# Patient Record
Sex: MALE | Race: WHITE | NOT HISPANIC OR LATINO | Employment: OTHER | URBAN - METROPOLITAN AREA
[De-identification: names, ages, dates, MRNs, and addresses within clinical notes are randomized per-mention and may not be internally consistent; named-entity substitution may affect disease eponyms.]

---

## 2023-05-12 ENCOUNTER — TELEPHONE (OUTPATIENT)
Dept: INTERNAL MEDICINE | Facility: CLINIC | Age: 73
End: 2023-05-12

## 2023-05-12 NOTE — TELEPHONE ENCOUNTER
Caller: Roverto Christensen     Relationship: [employer]     Best call back number: [phone]     What was the call regarding: PATIENT'S SISTER-IN-LAW AND BROTHER-IN-LAW, SELINA TURCIOS AND RADHIKA TURCIOS, ARE PATIENTS OF DR. PEREZ AND RECOMMEND HER CARE.      PATIENT IS REQUESTING TO BE TAKEN ON AS A NEW PATIENT OF DR. PEREZ.     Do you require a callback: YES, PLEASE CALL PATIENT'S WIFE, ROVERTO, TO LET PATIENT KNOW WETHER DR. PEREZ DECIDES TO TAKE HIM OR NOT

## 2023-08-02 ENCOUNTER — EXTERNAL PBMM DATA (OUTPATIENT)
Dept: PHARMACY | Facility: OTHER | Age: 73
End: 2023-08-02

## 2023-08-31 ENCOUNTER — OFFICE VISIT (OUTPATIENT)
Dept: INTERNAL MEDICINE | Facility: CLINIC | Age: 73
End: 2023-08-31
Payer: MEDICARE

## 2023-08-31 VITALS
DIASTOLIC BLOOD PRESSURE: 73 MMHG | HEART RATE: 111 BPM | BODY MASS INDEX: 44.11 KG/M2 | SYSTOLIC BLOOD PRESSURE: 146 MMHG | HEIGHT: 69 IN | WEIGHT: 297.8 LBS | OXYGEN SATURATION: 96 %

## 2023-08-31 DIAGNOSIS — E78.5 HYPERLIPIDEMIA, UNSPECIFIED HYPERLIPIDEMIA TYPE: ICD-10-CM

## 2023-08-31 DIAGNOSIS — Z79.899 HIGH RISK MEDICATION USE: ICD-10-CM

## 2023-08-31 DIAGNOSIS — I10 HYPERTENSION, UNSPECIFIED TYPE: ICD-10-CM

## 2023-08-31 DIAGNOSIS — E11.65 TYPE 2 DIABETES MELLITUS WITH HYPERGLYCEMIA, WITH LONG-TERM CURRENT USE OF INSULIN: Primary | ICD-10-CM

## 2023-08-31 DIAGNOSIS — Z79.4 TYPE 2 DIABETES MELLITUS WITH HYPERGLYCEMIA, WITH LONG-TERM CURRENT USE OF INSULIN: Primary | ICD-10-CM

## 2023-08-31 RX ORDER — GABAPENTIN 300 MG/1
CAPSULE ORAL
COMMUNITY
End: 2023-08-31

## 2023-08-31 RX ORDER — INSULIN HUMAN 100 [IU]/ML
INJECTION, SUSPENSION SUBCUTANEOUS
COMMUNITY
End: 2023-08-31 | Stop reason: SDUPTHER

## 2023-08-31 RX ORDER — LISINOPRIL AND HYDROCHLOROTHIAZIDE 25; 20 MG/1; MG/1
1 TABLET ORAL DAILY
Qty: 90 TABLET | Refills: 2 | Status: SHIPPED | OUTPATIENT
Start: 2023-08-31

## 2023-08-31 RX ORDER — CIPROFLOXACIN HYDROCHLORIDE 3.5 MG/ML
SOLUTION/ DROPS TOPICAL
COMMUNITY
End: 2023-08-31

## 2023-08-31 RX ORDER — DIPHENHYDRAMINE HYDROCHLORIDE 25 MG/1
CAPSULE, LIQUID FILLED ORAL
Qty: 1 EACH | Refills: 0 | Status: SHIPPED | OUTPATIENT
Start: 2023-08-31

## 2023-08-31 RX ORDER — PIOGLITAZONEHYDROCHLORIDE 30 MG/1
TABLET ORAL
COMMUNITY
End: 2023-08-31 | Stop reason: SDUPTHER

## 2023-08-31 RX ORDER — INSULIN HUMAN 100 [IU]/ML
85 INJECTION, SUSPENSION SUBCUTANEOUS
Qty: 155 ML | Refills: 0 | Status: SHIPPED | OUTPATIENT
Start: 2023-08-31

## 2023-08-31 RX ORDER — ALBUTEROL SULFATE 90 UG/1
AEROSOL, METERED RESPIRATORY (INHALATION)
COMMUNITY
End: 2023-08-31 | Stop reason: SDUPTHER

## 2023-08-31 RX ORDER — GLUCOSAMINE HCL/CHONDROITIN SU 500-400 MG
CAPSULE ORAL
Qty: 100 EACH | Refills: 2 | Status: SHIPPED | OUTPATIENT
Start: 2023-08-31

## 2023-08-31 RX ORDER — INSULIN HUMAN 100 [IU]/ML
10 INJECTION, SOLUTION PARENTERAL
Qty: 9 ML | Refills: 2 | Status: SHIPPED | OUTPATIENT
Start: 2023-08-31

## 2023-08-31 RX ORDER — LISINOPRIL AND HYDROCHLOROTHIAZIDE 25; 20 MG/1; MG/1
TABLET ORAL
COMMUNITY
End: 2023-08-31 | Stop reason: SDUPTHER

## 2023-08-31 RX ORDER — LANCETS
EACH MISCELLANEOUS
Qty: 100 EACH | Refills: 3 | Status: SHIPPED | OUTPATIENT
Start: 2023-08-31

## 2023-08-31 RX ORDER — IBUPROFEN 600 MG/1
TABLET ORAL
COMMUNITY

## 2023-08-31 RX ORDER — ALBUTEROL SULFATE 90 UG/1
1 AEROSOL, METERED RESPIRATORY (INHALATION) EVERY 4 HOURS PRN
Qty: 6.7 G | Refills: 3 | Status: SHIPPED | OUTPATIENT
Start: 2023-08-31

## 2023-08-31 RX ORDER — INSULIN HUMAN 100 [IU]/ML
INJECTION, SOLUTION PARENTERAL
COMMUNITY
End: 2023-08-31 | Stop reason: SDUPTHER

## 2023-08-31 RX ORDER — PIOGLITAZONEHYDROCHLORIDE 30 MG/1
30 TABLET ORAL DAILY
Qty: 90 TABLET | Refills: 2 | Status: SHIPPED | OUTPATIENT
Start: 2023-08-31

## 2023-08-31 RX ORDER — FLUCONAZOLE 200 MG/1
1 TABLET ORAL DAILY
COMMUNITY
End: 2023-08-31

## 2023-08-31 NOTE — PROGRESS NOTES
"  Ha Bear M.D.  Internal Medicine  [facility]  [address], [address]  [place] [address]  [phone]      Chief Complaint  Establish Care and Med Refill    SUBJECTIVE    History of Present Illness    Brian Christensen is a 73 y.o. male who presents to the office today as a new patient to establish care.     Here with Wife and daughter    Diabetes-on metformin, Actos, Humalog. Last A1c was 7.2% per patient. Takes sliding scale insulin as well and then rechecks 1 hour later.     Hypertension on lisinopril-hydrochlorothiazide    Bad knee-does not walk  far    Uses albuterol. PFTs \"many years ago\" was fine.     He thinks he is up to date on Shingrix.     Review of Systems    No Known Allergies     Outpatient Medications Marked as Taking for the 8/31/23 encounter (Office Visit) with Ha Bear MD   Medication Sig Dispense Refill    albuterol sulfate  (90 Base) MCG/ACT inhaler Inhale 1 puff Every 4 (Four) Hours As Needed for Wheezing. 6.7 g 3    HumuLIN N 100 UNIT/ML injection Inject 85 Units under the skin into the appropriate area as directed 2 (Two) Times a Day Before Meals. 155 mL 0    HumuLIN R 100 UNIT/ML injection Inject 10 Units under the skin into the appropriate area as directed 3 (Three) Times a Day Before Meals. 9 mL 2    ibuprofen (ADVIL,MOTRIN) 600 MG tablet       Insulin Syringes, Disposable, U-100 0.5 ML misc Use needle 5 times daily for injection into the skin of the abdomen or upper outside of the thighs.   Do not reuse needles. 100 each 2    lisinopril-hydrochlorothiazide (PRINZIDE,ZESTORETIC) 20-25 MG per tablet Take 1 tablet by mouth Daily. 90 tablet 2    metFORMIN (GLUCOPHAGE) 1000 MG tablet Take 1 tablet by mouth 2 (Two) Times a Day With Meals. 180 tablet 2    pioglitazone (ACTOS) 30 MG tablet Take 1 tablet by mouth Daily. 90 tablet 2    [DISCONTINUED] albuterol sulfate  (90 Base) MCG/ACT inhaler       [DISCONTINUED] HumuLIN N 100 UNIT/ML injection       " "[DISCONTINUED] HumuLIN R 100 UNIT/ML injection       [DISCONTINUED] lisinopril-hydrochlorothiazide (PRINZIDE,ZESTORETIC) 20-25 MG per tablet       [DISCONTINUED] metFORMIN (GLUCOPHAGE) 1000 MG tablet       [DISCONTINUED] pioglitazone (ACTOS) 30 MG tablet           History reviewed. No pertinent past medical history.History reviewed. No pertinent surgical history.  Family History   Problem Relation Age of Onset    Prostate cancer Father     reports that he quit smoking about 40 years ago. His smoking use included cigarettes. He has a 20.00 pack-year smoking history. He has never used smokeless tobacco. He reports that he does not currently use alcohol. He reports that he does not use drugs.    OBJECTIVE    Vital Signs:   /73   Pulse 111   Ht 174 cm (68.5\")   Wt 135 kg (297 lb 12.8 oz)   SpO2 96%   BMI 44.62 kg/m²     Physical Exam  Constitutional:       Appearance: Normal appearance.      Comments: Uses walker   Cardiovascular:      Rate and Rhythm: Normal rate and regular rhythm.      Heart sounds: Normal heart sounds. No murmur heard.  Pulmonary:      Effort: Pulmonary effort is normal.      Breath sounds: Normal breath sounds.   Abdominal:      General: Abdomen is flat. There is no distension.      Palpations: Abdomen is soft.      Tenderness: There is no abdominal tenderness.   Skin:     General: Skin is warm and dry.   Neurological:      Mental Status: He is alert.   Psychiatric:         Mood and Affect: Mood normal.         Behavior: Behavior normal.         Thought Content: Thought content normal.          The following data was reviewed by: Ha Bear MD on 08/31/2023:  Common labs          8/31/2023    15:09   Common Labs   Glucose 432    BUN 29    Creatinine 1.21    Sodium 130    Potassium 5.6    Chloride 92    Calcium 10.9    Total Protein 8.1    Albumin 4.7    Total Bilirubin 0.7    Alkaline Phosphatase 71    AST (SGOT) 19    ALT (SGPT) 27    WBC 9.34    Hemoglobin 14.9    Hematocrit 45.1  "   Platelets 393    Total Cholesterol 241    Triglycerides 348    HDL Cholesterol 34    LDL Cholesterol  143    Hemoglobin A1C 11.10      Data reviewed : No data to review          The 10-year ASCVD risk score (Patrizia HUANG, et al., 2019) is: 57.2%    Values used to calculate the score:      Age: 73 years      Sex: Male      Is Non- : No      Diabetic: Yes      Tobacco smoker: No      Systolic Blood Pressure: 146 mmHg      Is BP treated: Yes      HDL Cholesterol: 34 mg/dL      Total Cholesterol: 241 mg/dL      ASSESSMENT & PLAN     Diagnoses and all orders for this visit:    1. Type 2 diabetes mellitus with hyperglycemia, with long-term current use of insulin (Primary)  -     HumuLIN N 100 UNIT/ML injection; Inject 85 Units under the skin into the appropriate area as directed 2 (Two) Times a Day Before Meals.  Dispense: 155 mL; Refill: 0  -     HumuLIN R 100 UNIT/ML injection; Inject 10 Units under the skin into the appropriate area as directed 3 (Three) Times a Day Before Meals.  Dispense: 9 mL; Refill: 2  -     Glucose Blood (Blood Glucose Test) strip; Use with blood glucose meter to check blood sugar 5 times per day.  Dispense: 100 each; Refill: 2  -     Lancets Thin misc; Use with lancet device to check blood glucose 5 times per day via finger stick.  Dispense: 100 each; Refill: 3  -     Blood Glucose Monitoring Suppl (Blood Glucose Monitor System) w/Device kit; Use to check finger stick blood sugar _ times per day.  Dispense: 1 each; Refill: 0  -     metFORMIN (GLUCOPHAGE) 1000 MG tablet; Take 1 tablet by mouth 2 (Two) Times a Day With Meals.  Dispense: 180 tablet; Refill: 2  -     pioglitazone (ACTOS) 30 MG tablet; Take 1 tablet by mouth Daily.  Dispense: 90 tablet; Refill: 2  -     Ambulatory Referral to Endocrinology  -     Lipid Panel  -     Hemoglobin A1c  -     Insulin Syringes, Disposable, U-100 0.5 ML misc; Use needle 5 times daily for injection into the skin of the abdomen or upper  outside of the thighs.   Do not reuse needles.  Dispense: 100 each; Refill: 2    2. Hypertension, unspecified type  -     lisinopril-hydrochlorothiazide (PRINZIDE,ZESTORETIC) 20-25 MG per tablet; Take 1 tablet by mouth Daily.  Dispense: 90 tablet; Refill: 2  -     Comprehensive Metabolic Panel    3. Hyperlipidemia, unspecified hyperlipidemia type    4. High risk medication use  -     CBC & Differential    Other orders  -     Discontinue: Insulin Syringes, Disposable, U-100 0.5 ML misc; Use needle 5 times daily for injection into the skin of the abdomen or upper outside of the thighs.   Do not reuse needles.  Dispense: 100 each; Refill: 2  -     albuterol sulfate  (90 Base) MCG/ACT inhaler; Inhale 1 puff Every 4 (Four) Hours As Needed for Wheezing.  Dispense: 6.7 g; Refill: 3  -     Pneumococcal Conjugate Vaccine 20-Valent (PCV20)        Declines AAA. Screen. States this would be an acceptable way to die for him.      A1c 11.1% and BG critically high at 432. He thinks this was a fasting blood glucose. May have eaten chicken and potatoes for dinner the night previous.     He is on a significant amount of long acting insulin but does not seem to be taking Recommend 10 U Humilin R TID prior to meals and then sliding scale after. Discussed to bring BG log to next appointment so we can titrate further. Referring to endocrinology.    BP above goal. Will continue lisinopril-HCTZ for now. Patient to bring log to next appointment. Checking CMP today.     Discuss statin next appointment        Health Maintenance Due   Topic Date Due    URINE MICROALBUMIN  Never done    BMI FOLLOWUP  Never done    COLORECTAL CANCER SCREENING  Never done    COVID-19 Vaccine (1) Never done    TDAP/TD VACCINES (1 - Tdap) Never done    ZOSTER VACCINE (1 of 2) Never done    HEPATITIS C SCREENING  Never done    ANNUAL WELLNESS VISIT  Never done    DIABETIC FOOT EXAM  Never done    DIABETIC EYE EXAM  Never done    AAA SCREEN (ONE-TIME)  Never  done        Follow Up  Return in about 3 months (around 11/30/2023).    Patient/family had no further questions at this time and verbalized understanding of the plan discussed today.

## 2023-09-01 ENCOUNTER — TELEPHONE (OUTPATIENT)
Dept: INTERNAL MEDICINE | Facility: CLINIC | Age: 73
End: 2023-09-01
Payer: MEDICARE

## 2023-09-01 PROBLEM — E78.5 HYPERLIPIDEMIA: Status: ACTIVE | Noted: 2023-09-01

## 2023-09-01 PROBLEM — E11.65 TYPE 2 DIABETES MELLITUS WITH HYPERGLYCEMIA, WITH LONG-TERM CURRENT USE OF INSULIN: Status: ACTIVE | Noted: 2023-09-01

## 2023-09-01 PROBLEM — I10 HYPERTENSION: Status: ACTIVE | Noted: 2023-09-01

## 2023-09-01 PROBLEM — Z79.4 TYPE 2 DIABETES MELLITUS WITH HYPERGLYCEMIA, WITH LONG-TERM CURRENT USE OF INSULIN: Status: ACTIVE | Noted: 2023-09-01

## 2023-09-01 LAB
ALBUMIN SERPL-MCNC: 4.7 G/DL (ref 3.5–5.2)
ALBUMIN/GLOB SERPL: 1.4 G/DL
ALP SERPL-CCNC: 71 U/L (ref 39–117)
ALT SERPL-CCNC: 27 U/L (ref 1–41)
AST SERPL-CCNC: 19 U/L (ref 1–40)
BASOPHILS # BLD AUTO: 0.08 10*3/MM3 (ref 0–0.2)
BASOPHILS NFR BLD AUTO: 0.9 % (ref 0–1.5)
BILIRUB SERPL-MCNC: 0.7 MG/DL (ref 0–1.2)
BUN SERPL-MCNC: 29 MG/DL (ref 8–23)
BUN/CREAT SERPL: 24 (ref 7–25)
CALCIUM SERPL-MCNC: 10.9 MG/DL (ref 8.6–10.5)
CHLORIDE SERPL-SCNC: 92 MMOL/L (ref 98–107)
CHOLEST SERPL-MCNC: 241 MG/DL (ref 0–200)
CO2 SERPL-SCNC: 24 MMOL/L (ref 22–29)
CREAT SERPL-MCNC: 1.21 MG/DL (ref 0.76–1.27)
EGFRCR SERPLBLD CKD-EPI 2021: 63.6 ML/MIN/1.73
EOSINOPHIL # BLD AUTO: 0.24 10*3/MM3 (ref 0–0.4)
EOSINOPHIL NFR BLD AUTO: 2.6 % (ref 0.3–6.2)
ERYTHROCYTE [DISTWIDTH] IN BLOOD BY AUTOMATED COUNT: 13.4 % (ref 12.3–15.4)
GLOBULIN SER CALC-MCNC: 3.4 GM/DL
GLUCOSE SERPL-MCNC: 432 MG/DL (ref 65–99)
HBA1C MFR BLD: 11.1 % (ref 4.8–5.6)
HCT VFR BLD AUTO: 45.1 % (ref 37.5–51)
HDLC SERPL-MCNC: 34 MG/DL (ref 40–60)
HGB BLD-MCNC: 14.9 G/DL (ref 13–17.7)
IMM GRANULOCYTES # BLD AUTO: 0.05 10*3/MM3 (ref 0–0.05)
IMM GRANULOCYTES NFR BLD AUTO: 0.5 % (ref 0–0.5)
LDLC SERPL CALC-MCNC: 143 MG/DL (ref 0–100)
LYMPHOCYTES # BLD AUTO: 2.52 10*3/MM3 (ref 0.7–3.1)
LYMPHOCYTES NFR BLD AUTO: 27 % (ref 19.6–45.3)
MCH RBC QN AUTO: 29.4 PG (ref 26.6–33)
MCHC RBC AUTO-ENTMCNC: 33 G/DL (ref 31.5–35.7)
MCV RBC AUTO: 89 FL (ref 79–97)
MONOCYTES # BLD AUTO: 0.68 10*3/MM3 (ref 0.1–0.9)
MONOCYTES NFR BLD AUTO: 7.3 % (ref 5–12)
NEUTROPHILS # BLD AUTO: 5.77 10*3/MM3 (ref 1.7–7)
NEUTROPHILS NFR BLD AUTO: 61.7 % (ref 42.7–76)
NRBC BLD AUTO-RTO: 0 /100 WBC (ref 0–0.2)
PLATELET # BLD AUTO: 393 10*3/MM3 (ref 140–450)
POTASSIUM SERPL-SCNC: 5.6 MMOL/L (ref 3.5–5.2)
PROT SERPL-MCNC: 8.1 G/DL (ref 6–8.5)
RBC # BLD AUTO: 5.07 10*6/MM3 (ref 4.14–5.8)
SODIUM SERPL-SCNC: 130 MMOL/L (ref 136–145)
TRIGL SERPL-MCNC: 348 MG/DL (ref 0–150)
VLDLC SERPL CALC-MCNC: 64 MG/DL (ref 5–40)
WBC # BLD AUTO: 9.34 10*3/MM3 (ref 3.4–10.8)

## 2023-09-01 NOTE — TELEPHONE ENCOUNTER
I called Brian Christensen at [phone] at 13:10 EDT     Discussed lab results with patient and his wife.  Discussed that his glucose is critically high.  His sodium is on the low side and his potassium is elevated as well.  Suspect this is related to his hyperglycemia.  Discussed that his A1c is above goal at 11.1%.  Apparently he was fasting yesterday for his labs.  He states he had chicken and potatoes for dinner the night previous.  He is on a substantial amount of basal insulin.  I suspect he does need more mealtime insulin as he has not been consistent in taking this.  Discussed to take 10 units Humulin R prior to meals and his addition to his sliding scale.  Cholesterol is quite elevated as well.  We will discuss a statin at his next appointment.

## 2023-10-24 RX ORDER — IBUPROFEN 600 MG/1
TABLET ORAL
Qty: 90 TABLET | Refills: 0 | Status: SHIPPED | OUTPATIENT
Start: 2023-10-24

## 2023-10-24 RX ORDER — GEMFIBROZIL 600 MG/1
600 TABLET, FILM COATED ORAL 2 TIMES DAILY
Qty: 60 TABLET | Refills: 0 | Status: SHIPPED | OUTPATIENT
Start: 2023-10-24

## 2023-11-30 DIAGNOSIS — E11.65 TYPE 2 DIABETES MELLITUS WITH HYPERGLYCEMIA, WITH LONG-TERM CURRENT USE OF INSULIN: ICD-10-CM

## 2023-11-30 DIAGNOSIS — Z79.4 TYPE 2 DIABETES MELLITUS WITH HYPERGLYCEMIA, WITH LONG-TERM CURRENT USE OF INSULIN: ICD-10-CM

## 2023-11-30 RX ORDER — INSULIN HUMAN 100 [IU]/ML
10 INJECTION, SOLUTION PARENTERAL
Qty: 10 ML | Refills: 2 | Status: SHIPPED | OUTPATIENT
Start: 2023-11-30

## 2023-12-01 ENCOUNTER — OFFICE VISIT (OUTPATIENT)
Dept: INTERNAL MEDICINE | Facility: CLINIC | Age: 73
End: 2023-12-01
Payer: MEDICARE

## 2023-12-01 ENCOUNTER — APPOINTMENT (OUTPATIENT)
Dept: GENERAL RADIOLOGY | Facility: HOSPITAL | Age: 73
End: 2023-12-01
Payer: MEDICARE

## 2023-12-01 ENCOUNTER — HOSPITAL ENCOUNTER (OUTPATIENT)
Facility: HOSPITAL | Age: 73
Setting detail: OBSERVATION
Discharge: HOME | End: 2023-12-03
Attending: EMERGENCY MEDICINE | Admitting: INTERNAL MEDICINE
Payer: MEDICARE

## 2023-12-01 ENCOUNTER — TELEPHONE (OUTPATIENT)
Dept: INTERNAL MEDICINE | Facility: CLINIC | Age: 73
End: 2023-12-01

## 2023-12-01 VITALS
WEIGHT: 297 LBS | DIASTOLIC BLOOD PRESSURE: 56 MMHG | HEART RATE: 113 BPM | HEIGHT: 69 IN | BODY MASS INDEX: 43.99 KG/M2 | SYSTOLIC BLOOD PRESSURE: 74 MMHG | OXYGEN SATURATION: 95 %

## 2023-12-01 DIAGNOSIS — Z79.4 TYPE 2 DIABETES MELLITUS WITH HYPERGLYCEMIA, WITH LONG-TERM CURRENT USE OF INSULIN: ICD-10-CM

## 2023-12-01 DIAGNOSIS — E78.5 HYPERLIPIDEMIA, UNSPECIFIED HYPERLIPIDEMIA TYPE: ICD-10-CM

## 2023-12-01 DIAGNOSIS — I95.9 HYPOTENSION, UNSPECIFIED HYPOTENSION TYPE: Primary | ICD-10-CM

## 2023-12-01 DIAGNOSIS — I10 HYPERTENSION, UNSPECIFIED TYPE: ICD-10-CM

## 2023-12-01 DIAGNOSIS — N17.9 AKI (ACUTE KIDNEY INJURY): ICD-10-CM

## 2023-12-01 DIAGNOSIS — R73.9 HYPERGLYCEMIA: ICD-10-CM

## 2023-12-01 DIAGNOSIS — E11.65 TYPE 2 DIABETES MELLITUS WITH HYPERGLYCEMIA, WITH LONG-TERM CURRENT USE OF INSULIN: ICD-10-CM

## 2023-12-01 PROBLEM — R79.89 ELEVATED TROPONIN: Status: ACTIVE | Noted: 2023-12-01

## 2023-12-01 PROBLEM — E87.5 HYPERKALEMIA: Status: ACTIVE | Noted: 2023-12-01

## 2023-12-01 LAB
ALBUMIN SERPL-MCNC: 4.6 G/DL (ref 3.5–5.2)
ALBUMIN/GLOB SERPL: 1.4 G/DL
ALP SERPL-CCNC: 65 U/L (ref 39–117)
ALT SERPL W P-5'-P-CCNC: 31 U/L (ref 1–41)
ANION GAP SERPL CALCULATED.3IONS-SCNC: 14 MMOL/L (ref 5–15)
ANION GAP SERPL CALCULATED.3IONS-SCNC: 17.2 MMOL/L (ref 5–15)
AST SERPL-CCNC: 19 U/L (ref 1–40)
BACTERIA UR QL AUTO: ABNORMAL /HPF
BASOPHILS # BLD AUTO: 0.05 10*3/MM3 (ref 0–0.2)
BASOPHILS NFR BLD AUTO: 0.7 % (ref 0–1.5)
BILIRUB SERPL-MCNC: 0.5 MG/DL (ref 0–1.2)
BILIRUB UR QL STRIP: NEGATIVE
BUN SERPL-MCNC: 37 MG/DL (ref 8–23)
BUN SERPL-MCNC: 38 MG/DL (ref 8–23)
BUN/CREAT SERPL: 22.5 (ref 7–25)
BUN/CREAT SERPL: 26.8 (ref 7–25)
CALCIUM SPEC-SCNC: 11 MG/DL (ref 8.6–10.5)
CALCIUM SPEC-SCNC: 9.9 MG/DL (ref 8.6–10.5)
CHLORIDE SERPL-SCNC: 92 MMOL/L (ref 98–107)
CHLORIDE SERPL-SCNC: 96 MMOL/L (ref 98–107)
CLARITY UR: ABNORMAL
CO2 SERPL-SCNC: 19.8 MMOL/L (ref 22–29)
CO2 SERPL-SCNC: 24 MMOL/L (ref 22–29)
COLOR UR: YELLOW
CORTIS SERPL-MCNC: 23.2 MCG/DL
CREAT SERPL-MCNC: 1.38 MG/DL (ref 0.76–1.27)
CREAT SERPL-MCNC: 1.69 MG/DL (ref 0.76–1.27)
D-LACTATE SERPL-SCNC: 1.9 MMOL/L (ref 0.5–2)
D-LACTATE SERPL-SCNC: 3.1 MMOL/L (ref 0.5–2)
DEPRECATED RDW RBC AUTO: 43.2 FL (ref 37–54)
EGFRCR SERPLBLD CKD-EPI 2021: 42.6 ML/MIN/1.73
EGFRCR SERPLBLD CKD-EPI 2021: 54.3 ML/MIN/1.73
EOSINOPHIL # BLD AUTO: 0.08 10*3/MM3 (ref 0–0.4)
EOSINOPHIL NFR BLD AUTO: 1.1 % (ref 0.3–6.2)
ERYTHROCYTE [DISTWIDTH] IN BLOOD BY AUTOMATED COUNT: 13.2 % (ref 12.3–15.4)
GEN 5 2HR TROPONIN T REFLEX: 47 NG/L
GLOBULIN UR ELPH-MCNC: 3.2 GM/DL
GLUCOSE BLDC GLUCOMTR-MCNC: 369 MG/DL (ref 70–130)
GLUCOSE BLDC GLUCOMTR-MCNC: 376 MG/DL (ref 70–130)
GLUCOSE BLDC GLUCOMTR-MCNC: 481 MG/DL (ref 70–130)
GLUCOSE SERPL-MCNC: 374 MG/DL (ref 65–99)
GLUCOSE SERPL-MCNC: 452 MG/DL (ref 65–99)
GLUCOSE UR STRIP-MCNC: ABNORMAL MG/DL
HCT VFR BLD AUTO: 46 % (ref 37.5–51)
HGB BLD-MCNC: 14.5 G/DL (ref 13–17.7)
HGB UR QL STRIP.AUTO: NEGATIVE
HYALINE CASTS UR QL AUTO: ABNORMAL /LPF
IMM GRANULOCYTES # BLD AUTO: 0.02 10*3/MM3 (ref 0–0.05)
IMM GRANULOCYTES NFR BLD AUTO: 0.3 % (ref 0–0.5)
KETONES UR QL STRIP: NEGATIVE
LEUKOCYTE ESTERASE UR QL STRIP.AUTO: ABNORMAL
LYMPHOCYTES # BLD AUTO: 1.8 10*3/MM3 (ref 0.7–3.1)
LYMPHOCYTES NFR BLD AUTO: 23.7 % (ref 19.6–45.3)
MCH RBC QN AUTO: 28 PG (ref 26.6–33)
MCHC RBC AUTO-ENTMCNC: 31.5 G/DL (ref 31.5–35.7)
MCV RBC AUTO: 89 FL (ref 79–97)
MONOCYTES # BLD AUTO: 0.72 10*3/MM3 (ref 0.1–0.9)
MONOCYTES NFR BLD AUTO: 9.5 % (ref 5–12)
NEUTROPHILS NFR BLD AUTO: 4.92 10*3/MM3 (ref 1.7–7)
NEUTROPHILS NFR BLD AUTO: 64.7 % (ref 42.7–76)
NITRITE UR QL STRIP: NEGATIVE
NRBC BLD AUTO-RTO: 0 /100 WBC (ref 0–0.2)
PH UR STRIP.AUTO: <=5 [PH] (ref 5–8)
PLATELET # BLD AUTO: 389 10*3/MM3 (ref 140–450)
PMV BLD AUTO: 10.8 FL (ref 6–12)
POTASSIUM SERPL-SCNC: 4.9 MMOL/L (ref 3.5–5.2)
POTASSIUM SERPL-SCNC: 5.5 MMOL/L (ref 3.5–5.2)
PROT SERPL-MCNC: 7.8 G/DL (ref 6–8.5)
PROT UR QL STRIP: ABNORMAL
QT INTERVAL: 340 MS
QTC INTERVAL: 432 MS
RBC # BLD AUTO: 5.17 10*6/MM3 (ref 4.14–5.8)
RBC # UR STRIP: ABNORMAL /HPF
REF LAB TEST METHOD: ABNORMAL
SODIUM SERPL-SCNC: 130 MMOL/L (ref 136–145)
SODIUM SERPL-SCNC: 133 MMOL/L (ref 136–145)
SP GR UR STRIP: 1.02 (ref 1–1.03)
SQUAMOUS #/AREA URNS HPF: ABNORMAL /HPF
TROPONIN T DELTA: -9 NG/L
TROPONIN T SERPL HS-MCNC: 56 NG/L
UROBILINOGEN UR QL STRIP: ABNORMAL
WBC # UR STRIP: ABNORMAL /HPF
WBC NRBC COR # BLD AUTO: 7.59 10*3/MM3 (ref 3.4–10.8)

## 2023-12-01 PROCEDURE — 3078F DIAST BP <80 MM HG: CPT | Performed by: STUDENT IN AN ORGANIZED HEALTH CARE EDUCATION/TRAINING PROGRAM

## 2023-12-01 PROCEDURE — 87040 BLOOD CULTURE FOR BACTERIA: CPT | Performed by: EMERGENCY MEDICINE

## 2023-12-01 PROCEDURE — 63710000001 INSULIN GLARGINE PER 5 UNITS: Performed by: STUDENT IN AN ORGANIZED HEALTH CARE EDUCATION/TRAINING PROGRAM

## 2023-12-01 PROCEDURE — G0378 HOSPITAL OBSERVATION PER HR: HCPCS

## 2023-12-01 PROCEDURE — 87086 URINE CULTURE/COLONY COUNT: CPT | Performed by: EMERGENCY MEDICINE

## 2023-12-01 PROCEDURE — 82948 REAGENT STRIP/BLOOD GLUCOSE: CPT

## 2023-12-01 PROCEDURE — 99284 EMERGENCY DEPT VISIT MOD MDM: CPT

## 2023-12-01 PROCEDURE — 25810000003 SODIUM CHLORIDE 0.9 % SOLUTION: Performed by: EMERGENCY MEDICINE

## 2023-12-01 PROCEDURE — 25810000003 SODIUM CHLORIDE 0.9 % SOLUTION: Performed by: STUDENT IN AN ORGANIZED HEALTH CARE EDUCATION/TRAINING PROGRAM

## 2023-12-01 PROCEDURE — 96361 HYDRATE IV INFUSION ADD-ON: CPT

## 2023-12-01 PROCEDURE — 3074F SYST BP LT 130 MM HG: CPT | Performed by: STUDENT IN AN ORGANIZED HEALTH CARE EDUCATION/TRAINING PROGRAM

## 2023-12-01 PROCEDURE — 84484 ASSAY OF TROPONIN QUANT: CPT | Performed by: EMERGENCY MEDICINE

## 2023-12-01 PROCEDURE — 99214 OFFICE O/P EST MOD 30 MIN: CPT | Performed by: STUDENT IN AN ORGANIZED HEALTH CARE EDUCATION/TRAINING PROGRAM

## 2023-12-01 PROCEDURE — 71045 X-RAY EXAM CHEST 1 VIEW: CPT

## 2023-12-01 PROCEDURE — 36415 COLL VENOUS BLD VENIPUNCTURE: CPT

## 2023-12-01 PROCEDURE — 80053 COMPREHEN METABOLIC PANEL: CPT | Performed by: EMERGENCY MEDICINE

## 2023-12-01 PROCEDURE — 81001 URINALYSIS AUTO W/SCOPE: CPT | Performed by: EMERGENCY MEDICINE

## 2023-12-01 PROCEDURE — 85025 COMPLETE CBC W/AUTO DIFF WBC: CPT | Performed by: EMERGENCY MEDICINE

## 2023-12-01 PROCEDURE — 82533 TOTAL CORTISOL: CPT | Performed by: STUDENT IN AN ORGANIZED HEALTH CARE EDUCATION/TRAINING PROGRAM

## 2023-12-01 PROCEDURE — 63710000001 INSULIN LISPRO (HUMAN) PER 5 UNITS: Performed by: STUDENT IN AN ORGANIZED HEALTH CARE EDUCATION/TRAINING PROGRAM

## 2023-12-01 PROCEDURE — 83605 ASSAY OF LACTIC ACID: CPT | Performed by: EMERGENCY MEDICINE

## 2023-12-01 PROCEDURE — 96360 HYDRATION IV INFUSION INIT: CPT

## 2023-12-01 PROCEDURE — 93005 ELECTROCARDIOGRAM TRACING: CPT | Performed by: EMERGENCY MEDICINE

## 2023-12-01 PROCEDURE — 3046F HEMOGLOBIN A1C LEVEL >9.0%: CPT | Performed by: STUDENT IN AN ORGANIZED HEALTH CARE EDUCATION/TRAINING PROGRAM

## 2023-12-01 RX ORDER — INSULIN LISPRO 100 [IU]/ML
8 INJECTION, SOLUTION INTRAVENOUS; SUBCUTANEOUS ONCE
Status: DISCONTINUED | OUTPATIENT
Start: 2023-12-02 | End: 2023-12-02

## 2023-12-01 RX ORDER — NICOTINE POLACRILEX 4 MG
15 LOZENGE BUCCAL
Status: DISCONTINUED | OUTPATIENT
Start: 2023-12-01 | End: 2023-12-03 | Stop reason: HOSPADM

## 2023-12-01 RX ORDER — IBUPROFEN 600 MG/1
1 TABLET ORAL
Status: DISCONTINUED | OUTPATIENT
Start: 2023-12-01 | End: 2023-12-03 | Stop reason: HOSPADM

## 2023-12-01 RX ORDER — INSULIN LISPRO 100 [IU]/ML
6 INJECTION, SOLUTION INTRAVENOUS; SUBCUTANEOUS
Status: DISCONTINUED | OUTPATIENT
Start: 2023-12-01 | End: 2023-12-02

## 2023-12-01 RX ORDER — DEXTROSE MONOHYDRATE 25 G/50ML
25 INJECTION, SOLUTION INTRAVENOUS
Status: DISCONTINUED | OUTPATIENT
Start: 2023-12-01 | End: 2023-12-03 | Stop reason: HOSPADM

## 2023-12-01 RX ORDER — INSULIN LISPRO 100 [IU]/ML
2-9 INJECTION, SOLUTION INTRAVENOUS; SUBCUTANEOUS
Status: DISCONTINUED | OUTPATIENT
Start: 2023-12-01 | End: 2023-12-03 | Stop reason: HOSPADM

## 2023-12-01 RX ORDER — SODIUM CHLORIDE 9 MG/ML
100 INJECTION, SOLUTION INTRAVENOUS CONTINUOUS
Status: DISCONTINUED | OUTPATIENT
Start: 2023-12-01 | End: 2023-12-03 | Stop reason: HOSPADM

## 2023-12-01 RX ORDER — ALBUTEROL SULFATE 90 UG/1
1 AEROSOL, METERED RESPIRATORY (INHALATION) EVERY 4 HOURS PRN
Status: DISCONTINUED | OUTPATIENT
Start: 2023-12-01 | End: 2023-12-03 | Stop reason: HOSPADM

## 2023-12-01 RX ORDER — OXYMETAZOLINE HYDROCHLORIDE 0.05 G/100ML
2 SPRAY NASAL DAILY
COMMUNITY
End: 2023-12-03 | Stop reason: HOSPADM

## 2023-12-01 RX ADMIN — SODIUM CHLORIDE 1000 ML: 9 INJECTION, SOLUTION INTRAVENOUS at 17:01

## 2023-12-01 RX ADMIN — INSULIN GLARGINE 15 UNITS: 100 INJECTION, SOLUTION SUBCUTANEOUS at 22:05

## 2023-12-01 RX ADMIN — SODIUM CHLORIDE 100 ML/HR: 9 INJECTION, SOLUTION INTRAVENOUS at 22:05

## 2023-12-01 RX ADMIN — INSULIN LISPRO 9 UNITS: 100 INJECTION, SOLUTION INTRAVENOUS; SUBCUTANEOUS at 22:05

## 2023-12-01 NOTE — TELEPHONE ENCOUNTER
Patient has been scheduled for fasting labs on 12/04/2023 at 10:00 and to see Dr. Bear for a follow-up on 12/06/2023 at 11:45. Patient's wife and daughter are aware of the appointments.    Thanks,    Linda    ----- Message from Ha Bear MD sent at 12/1/2023  5:07 PM EST -----  Can you schedule him a follow-up visit with me next week?  It would be good if he could have labs prior as well.

## 2023-12-01 NOTE — ED PROVIDER NOTES
EMERGENCY DEPARTMENT ENCOUNTER    Room Number:    PCP: Ha Bear MD  Historian: Patient      HPI:  Chief Complaint: Hypotension  A complete HPI/ROS/PMH/PSH/SH/FH are unobtainable due to: None  Context: Brian Christensen is a 73 y.o. male who presents to the ED c/o hypotension.  Patient states he has felt fine.  States he went to primary doctor today for checkup.  States he did not feel lightheaded.  No chest pain pressure tightness.  No abdominal pain.  No vomiting or diarrhea.  Patient states has had no black stools.  States he does not check his blood sugars.  Patient has had no focal weakness or numbness.  Was found to have blood pressures in the 70s and was sent in for evaluation.  Has been taking his blood pressure medicine.  Has chronic low back pain.            PAST MEDICAL HISTORY  Active Ambulatory Problems     Diagnosis Date Noted    Type 2 diabetes mellitus with hyperglycemia, with long-term current use of insulin 2023    Hypertension 2023    Hyperlipidemia 2023     Resolved Ambulatory Problems     Diagnosis Date Noted    No Resolved Ambulatory Problems     No Additional Past Medical History         PAST SURGICAL HISTORY  No past surgical history on file.      FAMILY HISTORY  Family History   Problem Relation Age of Onset    Prostate cancer Father          SOCIAL HISTORY  Social History     Socioeconomic History    Marital status:    Tobacco Use    Smoking status: Former     Packs/day: 1.00     Years: 20.00     Additional pack years: 0.00     Total pack years: 20.00     Types: Cigarettes     Quit date:      Years since quittin.9    Smokeless tobacco: Never   Vaping Use    Vaping Use: Never used   Substance and Sexual Activity    Alcohol use: Not Currently    Drug use: Never    Sexual activity: Not Currently         ALLERGIES  Patient has no known allergies.        REVIEW OF SYSTEMS  Review of Systems   Hypotension, low back pain      PHYSICAL EXAM  ED Triage Vitals    Temp Heart Rate Resp BP SpO2   12/01/23 1508 12/01/23 1508 12/01/23 1514 12/01/23 1512 12/01/23 1508   96.9 °F (36.1 °C) 106 20 101/58 95 %      Temp src Heart Rate Source Patient Position BP Location FiO2 (%)   12/01/23 1508 12/01/23 1508 12/01/23 1512 12/01/23 1512 --   Tympanic Monitor Sitting Right arm        Physical Exam      GENERAL: no acute distress  HENT: nares patent  EYES: no scleral icterus  CV: regular rhythm, normal rate  RESPIRATORY: normal effort  ABDOMEN: soft  MUSCULOSKELETAL: no deformity. Low back pain  NEURO: alert, moves all extremities, follows commands  PSYCH:  calm, cooperative  SKIN: warm, dry    Vital signs and nursing notes reviewed.          LAB RESULTS  Recent Results (from the past 24 hour(s))   POC Glucose Once    Collection Time: 12/01/23  3:10 PM    Specimen: Blood   Result Value Ref Range    Glucose 481 (C) 70 - 130 mg/dL   ECG 12 Lead Altered Mental Status    Collection Time: 12/01/23  4:31 PM   Result Value Ref Range    QT Interval 340 ms    QTC Interval 432 ms   Comprehensive Metabolic Panel    Collection Time: 12/01/23  4:50 PM    Specimen: Blood   Result Value Ref Range    Glucose 452 (C) 65 - 99 mg/dL    BUN 38 (H) 8 - 23 mg/dL    Creatinine 1.69 (H) 0.76 - 1.27 mg/dL    Sodium 130 (L) 136 - 145 mmol/L    Potassium 5.5 (H) 3.5 - 5.2 mmol/L    Chloride 92 (L) 98 - 107 mmol/L    CO2 24.0 22.0 - 29.0 mmol/L    Calcium 11.0 (H) 8.6 - 10.5 mg/dL    Total Protein 7.8 6.0 - 8.5 g/dL    Albumin 4.6 3.5 - 5.2 g/dL    ALT (SGPT) 31 1 - 41 U/L    AST (SGOT) 19 1 - 40 U/L    Alkaline Phosphatase 65 39 - 117 U/L    Total Bilirubin 0.5 0.0 - 1.2 mg/dL    Globulin 3.2 gm/dL    A/G Ratio 1.4 g/dL    BUN/Creatinine Ratio 22.5 7.0 - 25.0    Anion Gap 14.0 5.0 - 15.0 mmol/L    eGFR 42.6 (L) >60.0 mL/min/1.73   Lactic Acid, Plasma    Collection Time: 12/01/23  4:50 PM    Specimen: Blood   Result Value Ref Range    Lactate 3.1 (C) 0.5 - 2.0 mmol/L   CBC Auto Differential    Collection Time:  12/01/23  4:50 PM    Specimen: Blood   Result Value Ref Range    WBC 7.59 3.40 - 10.80 10*3/mm3    RBC 5.17 4.14 - 5.80 10*6/mm3    Hemoglobin 14.5 13.0 - 17.7 g/dL    Hematocrit 46.0 37.5 - 51.0 %    MCV 89.0 79.0 - 97.0 fL    MCH 28.0 26.6 - 33.0 pg    MCHC 31.5 31.5 - 35.7 g/dL    RDW 13.2 12.3 - 15.4 %    RDW-SD 43.2 37.0 - 54.0 fl    MPV 10.8 6.0 - 12.0 fL    Platelets 389 140 - 450 10*3/mm3    Neutrophil % 64.7 42.7 - 76.0 %    Lymphocyte % 23.7 19.6 - 45.3 %    Monocyte % 9.5 5.0 - 12.0 %    Eosinophil % 1.1 0.3 - 6.2 %    Basophil % 0.7 0.0 - 1.5 %    Immature Grans % 0.3 0.0 - 0.5 %    Neutrophils, Absolute 4.92 1.70 - 7.00 10*3/mm3    Lymphocytes, Absolute 1.80 0.70 - 3.10 10*3/mm3    Monocytes, Absolute 0.72 0.10 - 0.90 10*3/mm3    Eosinophils, Absolute 0.08 0.00 - 0.40 10*3/mm3    Basophils, Absolute 0.05 0.00 - 0.20 10*3/mm3    Immature Grans, Absolute 0.02 0.00 - 0.05 10*3/mm3    nRBC 0.0 0.0 - 0.2 /100 WBC   Single High Sensitivity Troponin T    Collection Time: 12/01/23  4:50 PM    Specimen: Blood   Result Value Ref Range    HS Troponin T 56 (C) <22 ng/L   POC Glucose Once    Collection Time: 12/01/23  5:12 PM    Specimen: Blood   Result Value Ref Range    Glucose 376 (H) 70 - 130 mg/dL   Urinalysis With Culture If Indicated - Urine, Clean Catch    Collection Time: 12/01/23  6:36 PM    Specimen: Urine, Clean Catch   Result Value Ref Range    Color, UA Yellow Yellow, Straw    Appearance, UA Cloudy (A) Clear    pH, UA <=5.0 5.0 - 8.0    Specific Gravity, UA 1.024 1.005 - 1.030    Glucose, UA >=1000 mg/dL (3+) (A) Negative    Ketones, UA Negative Negative    Bilirubin, UA Negative Negative    Blood, UA Negative Negative    Protein, UA 30 mg/dL (1+) (A) Negative    Leuk Esterase, UA Moderate (2+) (A) Negative    Nitrite, UA Negative Negative    Urobilinogen, UA 0.2 E.U./dL 0.2 - 1.0 E.U./dL   Urinalysis, Microscopic Only - Urine, Clean Catch    Collection Time: 12/01/23  6:36 PM    Specimen: Urine, Clean  Catch   Result Value Ref Range    RBC, UA 0-2 None Seen, 0-2 /HPF    WBC, UA Too Numerous to Count (A) None Seen, 0-2 /HPF    Bacteria, UA 1+ (A) None Seen /HPF    Squamous Epithelial Cells, UA 3-6 (A) None Seen, 0-2 /HPF    Hyaline Casts, UA 0-2 None Seen /LPF    Methodology Manual Light Microscopy        Ordered the above labs and reviewed the results.        RADIOLOGY  XR Chest 1 View    Result Date: 12/1/2023  XR CHEST 1 VW-12/1/2023  HISTORY: Weakness. Fatigue.  Heart size is within normal limits.  2 images are submitted. Bony structures appear unremarkable.  Lungs appear clear.      No acute process.  This report was finalized on 12/1/2023 5:23 PM by Dr. Titus Lujan M.D on Workstation: [id number]       Ordered the above noted radiological studies.  Chest x-ray independently interpreted by me and shows no evidence of pneumonia          PROCEDURES  Procedures      EKG          EKG time: 1631  Rhythm/Rate: Normal sinus rhythm 97  P waves and MA: Normal P waves  QRS, axis: Normal QRS  ST and T waves: Nonspecific ST-T wave    Interpreted Contemporaneously by me, independently viewed  No prior      MEDICATIONS GIVEN IN ER  Medications   sodium chloride 0.9 % bolus 1,000 mL (1,000 mL Intravenous New Bag 12/1/23 1701)                   MEDICAL DECISION MAKING, PROGRESS, and CONSULTS     Discussion below represents my analysis of pertinent findings related to patient's condition, differential diagnosis, treatment plan and final disposition.      Additional sources:  - Discussed/ obtained information from independent historians: None    - External (non-ED) record review: Epic reviewed patient seen by primary provider today for hypotension and diabetes    - Chronic or social conditions impacting care: None    - Shared decision making: None      Orders placed during this visit:  Orders Placed This Encounter   Procedures    Blood Culture - Blood,    Blood Culture - Blood,    Urine Culture - Urine,    XR Chest 1 View     Comprehensive Metabolic Panel    Lactic Acid, Plasma    CBC Auto Differential    Single High Sensitivity Troponin T    STAT Lactic Acid, Reflex    High Sensitivity Troponin T 2Hr    Urinalysis With Culture If Indicated - Urine, Clean Catch    Urinalysis, Microscopic Only - Urine, Clean Catch    [facility] (on-call MD unless specified) Details    POC Glucose STAT    POC Glucose Once    POC Glucose Once    ECG 12 Lead Altered Mental Status    Initiate Observation Status    CBC & Differential         Additional orders considered but not ordered:  None        Differential diagnosis includes but is not limited to:    Dehydration, hyperglycemia, sepsis      Independent interpretation of labs, radiology studies, and discussions with consultants:  ED Course as of 12/01/23 1910   Fri Dec 01, 2023   1827 18:27 EST  Patient presents for evaluation of hypotension.  Patient's family states he has been having some diarrhea.  States he does not take care of his blood sugar at all.  Patient's blood sugar is 450.  Patient does have RAUL with some mild hyperkalemia.  Patient also with some hypercalcemia.  Lactic acid is elevated but patient has no focus of infection and temperature is normal.  Patient's troponin is elevated hopefully from the RAUL.  Discussed with Dr. Su who will admit. [SL]      ED Course User Index  [SL] Rustam Siddiqi MD                 DIAGNOSIS  Final diagnoses:   Hypotension, unspecified hypotension type   Hyperglycemia   RAUL (acute kidney injury)         DISPOSITION  admit            Latest Documented Vital Signs:  As of 19:10 EST  BP- 119/58 HR- 88 Temp- 96.9 °F (36.1 °C) (Tympanic) O2 sat- 94%              --    Please note that portions of this were completed with a voice recognition program.       Note Disclaimer: At [facility], we believe that sharing information builds trust and better relationships. You are receiving this note because you are receiving care at [facility] or recently visited.  It is possible you will see health information before a provider has talked with you about it. This kind of information can be easy to misunderstand. To help you fully understand what it means for your health, we urge you to discuss this note with your provider.            Rustam Siddiqi MD  12/01/23 1911

## 2023-12-01 NOTE — ED NOTES
"Nursing report ED to floor  Brian Christensen  73 y.o.  male    HPI :   Chief Complaint   Patient presents with    Hypotension       Admitting doctor:   Jamie Su MD    Admitting diagnosis:   The primary encounter diagnosis was Hypotension, unspecified hypotension type. Diagnoses of Hyperglycemia and RAUL (acute kidney injury) were also pertinent to this visit.    Code status:   Current Code Status       Date Active Code Status Order ID Comments User Context       Not on file            Allergies:   Patient has no known allergies.    Isolation:   No active isolations    Intake and Output  No intake or output data in the 24 hours ending 12/01/23 1848    Weight:       12/01/23  1512   Weight: 95.3 kg (210 lb)       Most recent vitals:   Vitals:    12/01/23 1508 12/01/23 1512 12/01/23 1514 12/01/23 1625   BP:  101/58  118/71   BP Location:  Right arm     Patient Position:  Sitting     Pulse: 106   99   Resp:   20    Temp: 96.9 °F (36.1 °C)      TempSrc: Tympanic      SpO2: 95%   93%   Weight:  95.3 kg (210 lb)     Height:  175.3 cm (69\")         Active LDAs/IV Access:   Lines, Drains & Airways       Active LDAs       Name Placement date Placement time Site Days    Peripheral IV 12/01/23 1701 Right Antecubital 12/01/23  1701  Antecubital  less than 1                    Labs (abnormal labs have a star):   Labs Reviewed   COMPREHENSIVE METABOLIC PANEL - Abnormal; Notable for the following components:       Result Value    Glucose 452 (*)     BUN 38 (*)     Creatinine 1.69 (*)     Sodium 130 (*)     Potassium 5.5 (*)     Chloride 92 (*)     Calcium 11.0 (*)     eGFR 42.6 (*)     All other components within normal limits    Narrative:     GFR Normal >60  Chronic Kidney Disease <60  Kidney Failure <15    The GFR formula is only valid for adults with stable renal function between ages 18 and 70.   LACTIC ACID, PLASMA - Abnormal; Notable for the following components:    Lactate 3.1 (*)     All other components within " normal limits   SINGLE HSTROPONIN T - Abnormal; Notable for the following components:    HS Troponin T 56 (*)     All other components within normal limits    Narrative:     High Sensitive Troponin T Reference Range:  <14.0 ng/L- Negative Female for AMI  <22.0 ng/L- Negative Male for AMI  >=14 - Abnormal Female indicating possible myocardial injury.  >=22 - Abnormal Male indicating possible myocardial injury.   Clinicians would have to utilize clinical acumen, EKG, Troponin, and serial changes to determine if it is an Acute Myocardial Infarction or myocardial injury due to an underlying chronic condition.        POCT GLUCOSE FINGERSTICK - Abnormal; Notable for the following components:    Glucose 481 (*)     All other components within normal limits   POCT GLUCOSE FINGERSTICK - Abnormal; Notable for the following components:    Glucose 376 (*)     All other components within normal limits   CBC WITH AUTO DIFFERENTIAL - Normal   BLOOD CULTURE   BLOOD CULTURE   LACTIC ACID, REFLEX   HIGH SENSITIVITIY TROPONIN T 2HR   URINALYSIS W/ CULTURE IF INDICATED   URINALYSIS, MICROSCOPIC ONLY   POCT GLUCOSE FINGERSTICK   CBC AND DIFFERENTIAL    Narrative:     The following orders were created for panel order CBC & Differential.  Procedure                               Abnormality         Status                     ---------                               -----------         ------                     CBC Auto Differential[[id number]]        Normal              Final result                 Please view results for these tests on the individual orders.       EKG:   ECG 12 Lead Altered Mental Status   Final Result   HEART RATE= 97  bpm   RR Interval= 619  ms   LA Interval= 167  ms   P Horizontal Axis= 14  deg   P Front Axis= 49  deg   QRSD Interval= 102  ms   QT Interval= 340  ms   QTcB= 432  ms   QRS Axis= -60  deg   T Wave Axis= 24  deg   - ABNORMAL ECG -   Sinus rhythm   Abnormal R-wave progression, late transition   Inferior  infarct, old   Lateral leads are also involved   No Prior Tracing for Comparison   Electronically Signed By: Jason Braswell ([facility]) 01-Dec-2023 17:52:15   Date and Time of Study: 2023 16:31:21          Meds given in ED:   Medications   sodium chloride 0.9 % bolus 1,000 mL (1,000 mL Intravenous New Bag 23 1701)       Imaging results:  XR Chest 1 View    Result Date: 2023  No acute process.  This report was finalized on 2023 5:23 PM by Dr. Titus Lujan M.D on Workstation: [email or web address]       Ambulatory status:   - stand by assist.    Social issues:   Social History     Socioeconomic History    Marital status:    Tobacco Use    Smoking status: Former     Packs/day: 1.00     Years: 20.00     Additional pack years: 0.00     Total pack years: 20.00     Types: Cigarettes     Quit date:      Years since quittin.9    Smokeless tobacco: Never   Vaping Use    Vaping Use: Never used   Substance and Sexual Activity    Alcohol use: Not Currently    Drug use: Never    Sexual activity: Not Currently       NIH Stroke Scale:       Lukas Rodgers RN  23 18:48 EST

## 2023-12-01 NOTE — PROGRESS NOTES
"  Ha Bear M.D.  Internal Medicine  [facility]  [address], [address]  [place] [address]  [phone]      Chief Complaint  Follow-up (3 mth F/U /)    SUBJECTIVE    History of Present Illness    Brian Christensen is a 73 y.o. male with diabetes, hypertension, osteoarthritis who presents to the office today as an established patient that last saw me on 8/31/2023.     Low back pain for a \"while\". Worse lately. Sleeping poorly. Does not radiate. Legs do not feel weak. Uses a walker. Has knee OA. No recent back imaging.      He denies fever/chills.    Takes lisinopril HCTZ in the AM. Does not monitor at home. No lightheaded/dizziness. He has diarrhea nightly for 3 days. Takes immodium. Stool is liquid/loose.     Diabetes-on metformin, Actos, Humalog. Last A1c was 11.1% in our office and patient had hyperglycemia of 432.  He was referred to endocrinology last visit but he was not accepted as a patient because he kept hanging up when they were trying to schedule him for an appointment.    He is prescribed 85 units twice daily of Humulin N and 10 units 3 times daily of Humulin R prior to meals. Checks BG about 1-2 times/week.  this morning per patient. Reports polyuria.     Only takes Humilin R prior to breakfast. Usese sliding scale and will give himself 5 U if over 200. Then every if blood glucose is greater than 210 and he gives himself 5 additional units and so on.      Hypertension on lisinopril-hydrochlorothiazide.  He states he uses a pill minder which he feels himself.  He states he did not take more than 1 dose of medication today.    His wife notes he has been more aggressive.  He just states he has not been sleeping well due to pain and his Internet being out (apparently plays [employer] until he falls asleep).       Review of Systems    No Known Allergies     Outpatient Medications Marked as Taking for the 12/1/23 encounter (Office Visit) with Ha Bear MD   Medication Sig " Dispense Refill    albuterol sulfate  (90 Base) MCG/ACT inhaler Inhale 1 puff Every 4 (Four) Hours As Needed for Wheezing. 6.7 g 3    Blood Glucose Monitoring Suppl (Blood Glucose Monitor System) w/Device kit Use to check finger stick blood sugar _ times per day. 1 each 0    gemfibrozil (LOPID) 600 MG tablet TAKE 1 (ONE) TABLET BY MOUTH TWICE DAILY 60 tablet 0    Glucose Blood (Blood Glucose Test) strip Use with blood glucose meter to check blood sugar 5 times per day. 100 each 2    HumuLIN N 100 UNIT/ML injection Inject 85 Units under the skin into the appropriate area as directed 2 (Two) Times a Day Before Meals. 155 mL 0    HumuLIN R 100 UNIT/ML injection Inject 10 Units under the skin into the appropriate area as directed 3 (Three) Times a Day Before Meals. 10 mL 2    ibuprofen (ADVIL,MOTRIN) 600 MG tablet TAKE 1 (ONE) TABLET BY MOUTH EVERY 8 HOURS 90 tablet 0    Insulin Syringes, Disposable, U-100 0.5 ML misc Use needle 5 times daily for injection into the skin of the abdomen or upper outside of the thighs.   Do not reuse needles. 100 each 2    Lancets Thin misc Use with lancet device to check blood glucose 5 times per day via finger stick. 100 each 3    lisinopril-hydrochlorothiazide (PRINZIDE,ZESTORETIC) 20-25 MG per tablet Take 1 tablet by mouth Daily. 90 tablet 2    metFORMIN (GLUCOPHAGE) 1000 MG tablet Take 1 tablet by mouth 2 (Two) Times a Day With Meals. 180 tablet 2    pioglitazone (ACTOS) 30 MG tablet Take 1 tablet by mouth Daily. 90 tablet 2        History reviewed. No pertinent past medical history.No past surgical history on file.  Family History   Problem Relation Age of Onset    Prostate cancer Father     reports that he quit smoking about 40 years ago. His smoking use included cigarettes. He has a 20.00 pack-year smoking history. He has never used smokeless tobacco. He reports that he does not currently use alcohol. He reports that he does not use drugs.    OBJECTIVE    Vital Signs:   BP  "(!) 74/56   Pulse 113   Ht 174 cm (68.5\")   Wt 135 kg (297 lb)   SpO2 95%   BMI 44.50 kg/m²     Physical Exam  Cardiovascular:      Rate and Rhythm: Normal rate and regular rhythm.      Heart sounds: Normal heart sounds. No murmur heard.  Pulmonary:      Effort: Pulmonary effort is normal.      Breath sounds: Normal breath sounds.   Musculoskeletal:      Thoracic back: No edema or signs of trauma. Decreased range of motion.      Lumbar back: No edema, signs of trauma, tenderness or bony tenderness. Decreased range of motion.   Skin:     General: Skin is warm and dry.   Neurological:      Mental Status: He is alert.      Comments: Groans when he stands up.  Seems somewhat confused.            The following data was reviewed by: Ha Bear MD on 12/01/2023:  CMP          8/31/2023    15:09   CMP   Glucose 432    BUN 29    Creatinine 1.21    Sodium 130    Potassium 5.6    Chloride 92    Calcium 10.9    Total Protein 8.1    Albumin 4.7    Globulin 3.4    Total Bilirubin 0.7    Alkaline Phosphatase 71    AST (SGOT) 19    ALT (SGPT) 27    BUN/Creatinine Ratio 24.0      CBC w/diff          8/31/2023    15:09   CBC w/Diff   WBC 9.34    RBC 5.07    Hemoglobin 14.9    Hematocrit 45.1    MCV 89.0    MCH 29.4    MCHC 33.0    RDW 13.4    Platelets 393    Neutrophil Rel % 61.7    Lymphocyte Rel % 27.0    Monocyte Rel % 7.3    Eosinophil Rel % 2.6    Basophil Rel % 0.9      Lipid Panel          8/31/2023    15:09   Lipid Panel   Total Cholesterol 241    Triglycerides 348    HDL Cholesterol 34    VLDL Cholesterol 64    LDL Cholesterol  143        A1C Last 3 Results          8/31/2023    15:09   HGBA1C Last 3 Results   Hemoglobin A1C 11.10                  ASSESSMENT & PLAN     Diagnoses and all orders for this visit:    1. Hypotension, unspecified hypotension type (Primary)    2. Type 2 diabetes mellitus with hyperglycemia, with long-term current use of insulin  -     Hemoglobin A1c; Future  -     Microalbumin / Creatinine " Urine Ratio - Urine, Clean Catch; Future    3. Hypertension, unspecified type  -     Comprehensive Metabolic Panel; Future    4. Hyperlipidemia, unspecified hyperlipidemia type  -     Lipid Panel; Future      73 y.o. male with diabetes, hypertension, osteoarthritis here today for follow-up.  His blood pressure was 74/56 initially and I rechecked it in both arms and blood pressure remained in was 76/44 in the left arm and 90/55 in the left arm.  He seems somewhat confused.  I am concerned he may have taken too much of his blood pressure medication today although he adamantly denies this.  I think he needs IV fluid.  Patient was advised to go to the emergency department.  I would be concerned that he could have another issue going on given back pain such as severe UTI, vertebral osteo or vertebral fracture etc.  I would like for him to follow-up closely next week with labs prior so we can discuss his diabetes.  I suspect it is still under poor control given he is not checking his blood glucose regularly and is already on a very high dose of basal insulin.    Health Maintenance Due   Topic Date Due    URINE MICROALBUMIN  Never done    BMI FOLLOWUP  Never done    COLORECTAL CANCER SCREENING  Never done    COVID-19 Vaccine (1) Never done    TDAP/TD VACCINES (1 - Tdap) Never done    ZOSTER VACCINE (1 of 2) Never done    HEPATITIS C SCREENING  Never done    ANNUAL WELLNESS VISIT  Never done    DIABETIC FOOT EXAM  Never done    DIABETIC EYE EXAM  Never done    INFLUENZA VACCINE  Never done        Follow Up  Return in about 1 week (around 12/8/2023).    Patient/family had no further questions at this time and verbalized understanding of the plan discussed today.

## 2023-12-02 LAB
ANION GAP SERPL CALCULATED.3IONS-SCNC: 10.3 MMOL/L (ref 5–15)
BACTERIA SPEC AEROBE CULT: NORMAL
BUN SERPL-MCNC: 35 MG/DL (ref 8–23)
BUN/CREAT SERPL: 30.2 (ref 7–25)
CALCIUM SPEC-SCNC: 9.9 MG/DL (ref 8.6–10.5)
CHLORIDE SERPL-SCNC: 97 MMOL/L (ref 98–107)
CO2 SERPL-SCNC: 23.7 MMOL/L (ref 22–29)
CREAT SERPL-MCNC: 1.16 MG/DL (ref 0.76–1.27)
DEPRECATED RDW RBC AUTO: 41.9 FL (ref 37–54)
EGFRCR SERPLBLD CKD-EPI 2021: 66.9 ML/MIN/1.73
ERYTHROCYTE [DISTWIDTH] IN BLOOD BY AUTOMATED COUNT: 13.1 % (ref 12.3–15.4)
GLUCOSE BLDC GLUCOMTR-MCNC: 222 MG/DL (ref 70–130)
GLUCOSE BLDC GLUCOMTR-MCNC: 240 MG/DL (ref 70–130)
GLUCOSE BLDC GLUCOMTR-MCNC: 326 MG/DL (ref 70–130)
GLUCOSE BLDC GLUCOMTR-MCNC: 399 MG/DL (ref 70–130)
GLUCOSE SERPL-MCNC: 426 MG/DL (ref 65–99)
HBA1C MFR BLD: 11.7 % (ref 4.8–5.6)
HCT VFR BLD AUTO: 40.3 % (ref 37.5–51)
HGB BLD-MCNC: 13.1 G/DL (ref 13–17.7)
MAGNESIUM SERPL-MCNC: 1.6 MG/DL (ref 1.6–2.4)
MCH RBC QN AUTO: 28.5 PG (ref 26.6–33)
MCHC RBC AUTO-ENTMCNC: 32.5 G/DL (ref 31.5–35.7)
MCV RBC AUTO: 87.8 FL (ref 79–97)
PHOSPHATE SERPL-MCNC: 3.4 MG/DL (ref 2.5–4.5)
PLATELET # BLD AUTO: 311 10*3/MM3 (ref 140–450)
PMV BLD AUTO: 10.5 FL (ref 6–12)
POTASSIUM SERPL-SCNC: 4.6 MMOL/L (ref 3.5–5.2)
RBC # BLD AUTO: 4.59 10*6/MM3 (ref 4.14–5.8)
SODIUM SERPL-SCNC: 131 MMOL/L (ref 136–145)
WBC NRBC COR # BLD AUTO: 5.94 10*3/MM3 (ref 3.4–10.8)

## 2023-12-02 PROCEDURE — 82948 REAGENT STRIP/BLOOD GLUCOSE: CPT

## 2023-12-02 PROCEDURE — 96361 HYDRATE IV INFUSION ADD-ON: CPT

## 2023-12-02 PROCEDURE — 83036 HEMOGLOBIN GLYCOSYLATED A1C: CPT | Performed by: STUDENT IN AN ORGANIZED HEALTH CARE EDUCATION/TRAINING PROGRAM

## 2023-12-02 PROCEDURE — G0378 HOSPITAL OBSERVATION PER HR: HCPCS

## 2023-12-02 PROCEDURE — 83735 ASSAY OF MAGNESIUM: CPT | Performed by: STUDENT IN AN ORGANIZED HEALTH CARE EDUCATION/TRAINING PROGRAM

## 2023-12-02 PROCEDURE — 80048 BASIC METABOLIC PNL TOTAL CA: CPT | Performed by: STUDENT IN AN ORGANIZED HEALTH CARE EDUCATION/TRAINING PROGRAM

## 2023-12-02 PROCEDURE — 25810000003 SODIUM CHLORIDE 0.9 % SOLUTION: Performed by: STUDENT IN AN ORGANIZED HEALTH CARE EDUCATION/TRAINING PROGRAM

## 2023-12-02 PROCEDURE — 85027 COMPLETE CBC AUTOMATED: CPT | Performed by: STUDENT IN AN ORGANIZED HEALTH CARE EDUCATION/TRAINING PROGRAM

## 2023-12-02 PROCEDURE — 63710000001 INSULIN GLARGINE PER 5 UNITS: Performed by: STUDENT IN AN ORGANIZED HEALTH CARE EDUCATION/TRAINING PROGRAM

## 2023-12-02 PROCEDURE — 63710000001 INSULIN LISPRO (HUMAN) PER 5 UNITS: Performed by: INTERNAL MEDICINE

## 2023-12-02 PROCEDURE — 63710000001 INSULIN LISPRO (HUMAN) PER 5 UNITS: Performed by: STUDENT IN AN ORGANIZED HEALTH CARE EDUCATION/TRAINING PROGRAM

## 2023-12-02 PROCEDURE — 84100 ASSAY OF PHOSPHORUS: CPT | Performed by: STUDENT IN AN ORGANIZED HEALTH CARE EDUCATION/TRAINING PROGRAM

## 2023-12-02 PROCEDURE — 63710000001 INSULIN GLARGINE PER 5 UNITS: Performed by: INTERNAL MEDICINE

## 2023-12-02 RX ORDER — NITROGLYCERIN 0.4 MG/1
0.4 TABLET SUBLINGUAL
Status: DISCONTINUED | OUTPATIENT
Start: 2023-12-02 | End: 2023-12-03 | Stop reason: HOSPADM

## 2023-12-02 RX ORDER — BISACODYL 5 MG/1
5 TABLET, DELAYED RELEASE ORAL DAILY PRN
Status: DISCONTINUED | OUTPATIENT
Start: 2023-12-02 | End: 2023-12-03 | Stop reason: HOSPADM

## 2023-12-02 RX ORDER — SODIUM CHLORIDE 0.9 % (FLUSH) 0.9 %
10 SYRINGE (ML) INJECTION EVERY 12 HOURS SCHEDULED
Status: DISCONTINUED | OUTPATIENT
Start: 2023-12-02 | End: 2023-12-03 | Stop reason: HOSPADM

## 2023-12-02 RX ORDER — GEMFIBROZIL 600 MG/1
600 TABLET, FILM COATED ORAL 2 TIMES DAILY
Status: DISCONTINUED | OUTPATIENT
Start: 2023-12-02 | End: 2023-12-03 | Stop reason: HOSPADM

## 2023-12-02 RX ORDER — POLYETHYLENE GLYCOL 3350 17 G/17G
17 POWDER, FOR SOLUTION ORAL DAILY PRN
Status: DISCONTINUED | OUTPATIENT
Start: 2023-12-02 | End: 2023-12-03 | Stop reason: HOSPADM

## 2023-12-02 RX ORDER — AMOXICILLIN 250 MG
2 CAPSULE ORAL 2 TIMES DAILY
Status: DISCONTINUED | OUTPATIENT
Start: 2023-12-02 | End: 2023-12-03 | Stop reason: HOSPADM

## 2023-12-02 RX ORDER — INSULIN LISPRO 100 [IU]/ML
10 INJECTION, SOLUTION INTRAVENOUS; SUBCUTANEOUS
Status: DISCONTINUED | OUTPATIENT
Start: 2023-12-02 | End: 2023-12-03 | Stop reason: HOSPADM

## 2023-12-02 RX ORDER — SODIUM CHLORIDE 0.9 % (FLUSH) 0.9 %
10 SYRINGE (ML) INJECTION AS NEEDED
Status: DISCONTINUED | OUTPATIENT
Start: 2023-12-02 | End: 2023-12-03 | Stop reason: HOSPADM

## 2023-12-02 RX ORDER — ACETAMINOPHEN 325 MG/1
650 TABLET ORAL EVERY 6 HOURS PRN
Status: DISCONTINUED | OUTPATIENT
Start: 2023-12-02 | End: 2023-12-03 | Stop reason: HOSPADM

## 2023-12-02 RX ORDER — SODIUM CHLORIDE 9 MG/ML
40 INJECTION, SOLUTION INTRAVENOUS AS NEEDED
Status: DISCONTINUED | OUTPATIENT
Start: 2023-12-02 | End: 2023-12-03 | Stop reason: HOSPADM

## 2023-12-02 RX ORDER — OXYMETAZOLINE HYDROCHLORIDE 0.05 G/100ML
2 SPRAY NASAL DAILY
Status: DISCONTINUED | OUTPATIENT
Start: 2023-12-02 | End: 2023-12-03 | Stop reason: HOSPADM

## 2023-12-02 RX ORDER — BISACODYL 10 MG
10 SUPPOSITORY, RECTAL RECTAL DAILY PRN
Status: DISCONTINUED | OUTPATIENT
Start: 2023-12-02 | End: 2023-12-03 | Stop reason: HOSPADM

## 2023-12-02 RX ADMIN — INSULIN LISPRO 10 UNITS: 100 INJECTION, SOLUTION INTRAVENOUS; SUBCUTANEOUS at 17:34

## 2023-12-02 RX ADMIN — OXYMETAZOLINE HYDROCHLORIDE 2 SPRAY: 0.05 SPRAY NASAL at 16:52

## 2023-12-02 RX ADMIN — Medication 10 ML: at 20:53

## 2023-12-02 RX ADMIN — SODIUM CHLORIDE 100 ML/HR: 9 INJECTION, SOLUTION INTRAVENOUS at 08:28

## 2023-12-02 RX ADMIN — INSULIN LISPRO 4 UNITS: 100 INJECTION, SOLUTION INTRAVENOUS; SUBCUTANEOUS at 22:06

## 2023-12-02 RX ADMIN — INSULIN LISPRO 10 UNITS: 100 INJECTION, SOLUTION INTRAVENOUS; SUBCUTANEOUS at 12:39

## 2023-12-02 RX ADMIN — SODIUM ZIRCONIUM CYCLOSILICATE 5 G: 5 POWDER, FOR SUSPENSION ORAL at 01:22

## 2023-12-02 RX ADMIN — SODIUM CHLORIDE 100 ML/HR: 9 INJECTION, SOLUTION INTRAVENOUS at 19:22

## 2023-12-02 RX ADMIN — INSULIN LISPRO 7 UNITS: 100 INJECTION, SOLUTION INTRAVENOUS; SUBCUTANEOUS at 12:39

## 2023-12-02 RX ADMIN — INSULIN LISPRO 9 UNITS: 100 INJECTION, SOLUTION INTRAVENOUS; SUBCUTANEOUS at 08:28

## 2023-12-02 RX ADMIN — INSULIN GLARGINE 40 UNITS: 100 INJECTION, SOLUTION SUBCUTANEOUS at 22:06

## 2023-12-02 RX ADMIN — ACETAMINOPHEN 650 MG: 325 TABLET, FILM COATED ORAL at 03:22

## 2023-12-02 RX ADMIN — INSULIN LISPRO 4 UNITS: 100 INJECTION, SOLUTION INTRAVENOUS; SUBCUTANEOUS at 17:34

## 2023-12-02 RX ADMIN — INSULIN GLARGINE 25 UNITS: 100 INJECTION, SOLUTION SUBCUTANEOUS at 08:28

## 2023-12-02 RX ADMIN — INSULIN LISPRO 6 UNITS: 100 INJECTION, SOLUTION INTRAVENOUS; SUBCUTANEOUS at 08:28

## 2023-12-02 NOTE — SIGNIFICANT NOTE
12/02/23 0902   OTHER   Discipline physical therapist   Rehab Time/Intention   Session Not Performed patient/family declined evaluation  (pt found sitting up in bed, pleasantly declined need for PT services, reports independence with mobility at baseline, hoping to d/c home today. Will sign off)   Therapy Assessment/Plan (PT)   Criteria for Skilled Interventions Met (PT) patient/family refuse skilled intervention at this time

## 2023-12-02 NOTE — PLAN OF CARE
Goal Outcome Evaluation:   Patient alert follows commands. Admitted from er this shift. Database and assessment complete. Prn tylenol for chronic pain. No nausea noted. Assist x1 to bathroom, ambulates with walker at home. Iv fluids infusing. No acute distress noted will continue to monitor

## 2023-12-02 NOTE — PROGRESS NOTES
Name: Brian Christensen ADMIT: 2023   : 1950  PCP: Ha Bear MD    MRN: [id number] LOS: 0 days   AGE/SEX: 73 y.o. male  ROOM: [facility]/     Subjective   Subjective   First thing the patient stated when I walked into the room that he wants to leave.  He denies any polydipsia or polyuria.  No hypoglycemia.  No chest pain.  No shortness of breath.  No cough.  No palpitation.  No cough.  No wheezing.  No hemoptysis.  No dizziness.  No weakness.  No focal neurological symptoms.    Review of Systems  .  No dysuria or hematuria.  .  No abdominal pain or nausea or vomiting.     Objective   Objective   Vital Signs  Temp:  [96.9 °F (36.1 °C)-97.7 °F (36.5 °C)] 97.5 °F (36.4 °C)  Heart Rate:  [] 81  Resp:  [20] 20  BP: ()/(56-71) 116/58  SpO2:  [93 %-97 %] 93 %  on   ;   Device (Oxygen Therapy): room air    Intake/Output Summary (Last 24 hours) at 2023 1046  Last data filed at 2023 0627  Gross per 24 hour   Intake 1836.67 ml   Output 1150 ml   Net 686.67 ml     Body mass index is 43.01 kg/m².      23  2105 23  0557 23  0600   Weight: 132 kg (291 lb 3.6 oz) 132 kg (291 lb 3.6 oz) 132 kg (291 lb 3.6 oz)     Physical Exam  General.  Elderly gentleman.  Obese.  Alert and oriented x 3.  No apparent pain/distress/diaphoresis.  Normal mood and affect.  Eyes.  Pupils equal round and reactive.  Intact extraocular musculature.  No pallor or jaundice.  Oral cavity.  Moist mucous membrane.  Neck.  Supple.  No JVD.  No lymphadenopathy or thyromegaly.  Cardiovascular.  Regular rate and rhythm with grade 2 systolic murmur.  Chest.  Clear to auscultation bilaterally with no added sounds  Abdomen.  Obese.  Soft lax.  No tenderness.  No organomegaly.  No guarding or rebound.  Extremities.  No clubbing/cyanosis/edema.  CNS.  No acute focal neurological deficits.    Results Review:      Results from last 7 days   Lab Units 23  0615 23  1955 23  1650   SODIUM mmol/L 131* 133*  "130*   POTASSIUM mmol/L 4.6 4.9 5.5*   CHLORIDE mmol/L 97* 96* 92*   CO2 mmol/L 23.7 19.8* 24.0   BUN mg/dL 35* 37* 38*   CREATININE mg/dL 1.16 1.38* 1.69*   GLUCOSE mg/dL 426* 374* 452*   CALCIUM mg/dL 9.9 9.9 11.0*   AST (SGOT) U/L  --   --  19   ALT (SGPT) U/L  --   --  31     Estimated Creatinine Clearance: 77.5 mL/min (by C-G formula based on SCr of 1.16 mg/dL).  Results from last 7 days   Lab Units 12/02/23  0615   HEMOGLOBIN A1C % 11.70*     Results from last 7 days   Lab Units 12/02/23  0558 12/01/23  2109 12/01/23  1712 12/01/23  1510   GLUCOSE mg/dL 399* 369* 376* 481*     Results from last 7 days   Lab Units 12/01/23  1955 12/01/23  1650   HSTROP T ng/L 47* 56*             Results from last 7 days   Lab Units 12/02/23  0615   MAGNESIUM mg/dL 1.6   PHOSPHORUS mg/dL 3.4           Invalid input(s): \"LDLCALC\"  Results from last 7 days   Lab Units 12/02/23  0615 12/01/23  1650   WBC 10*3/mm3 5.94 7.59   HEMOGLOBIN g/dL 13.1 14.5   HEMATOCRIT % 40.3 46.0   PLATELETS 10*3/mm3 311 389   MCV fL 87.8 89.0   MCH pg 28.5 28.0   MCHC g/dL 32.5 31.5   RDW % 13.1 13.2   RDW-SD fl 41.9 43.2   MPV fL 10.5 10.8   NEUTROPHIL % %  --  64.7   LYMPHOCYTE % %  --  23.7   MONOCYTES % %  --  9.5   EOSINOPHIL % %  --  1.1   BASOPHIL % %  --  0.7   IMM GRAN % %  --  0.3   NEUTROS ABS 10*3/mm3  --  4.92   LYMPHS ABS 10*3/mm3  --  1.80   MONOS ABS 10*3/mm3  --  0.72   EOS ABS 10*3/mm3  --  0.08   BASOS ABS 10*3/mm3  --  0.05   IMMATURE GRANS (ABS) 10*3/mm3  --  0.02   NRBC /100 WBC  --  0.0             Results from last 7 days   Lab Units 12/01/23 1955 12/01/23  1650   LACTATE mmol/L 1.9 3.1*                     Results from last 7 days   Lab Units 12/01/23  1836   NITRITE UA  Negative   WBC UA /HPF Too Numerous to Count*   BACTERIA UA /HPF 1+*   SQUAM EPITHEL UA /HPF 3-6*           Imaging:  Imaging Results (Last 24 Hours)       Procedure Component Value Units Date/Time    XR Chest 1 View [[id number]] Collected: 12/01/23 1716     " Updated: 12/01/23 1726    Narrative:      XR CHEST 1 VW-12/1/2023     HISTORY: Weakness. Fatigue.     Heart size is within normal limits.     2 images are submitted. Bony structures appear unremarkable.     Lungs appear clear.       Impression:      No acute process.     This report was finalized on 12/1/2023 5:23 PM by Dr. Titus Lujan M.D on Workstation: [email or web address]                  I reviewed the patient's new clinical results / labs / tests / procedures      Assessment/Plan     Active Hospital Problems    Diagnosis  POA    **Hyperglycemia [R73.9]  Yes    RAUL (acute kidney injury) [N17.9]  Unknown    Elevated troponin [R79.89]  Unknown    Hypotension [I95.9]  Unknown    Hyperkalemia [E87.5]  Unknown    Type 2 diabetes mellitus with hyperglycemia, with long-term current use of insulin [E11.65, Z79.4]  Not Applicable    Hypertension [I10]  Yes    Hyperlipidemia [E78.5]  Yes      Resolved Hospital Problems   No resolved problems to display.           Hypotension in a patient with a history of hypertension.  Mostly secondary to dehydration.  Resolved with holding oral hypertensives and IV fluid.  No evidence of sepsis.  Acute kidney failure hyponatremia and hyperkalemia.  Resolved with IV fluid and status post Lokelma.  Normal corrected sodium.  Type 2 diabetes.  Uncontrolled.  A1c is 11.7.  Will increase Lantus and AC Humalog and continue sliding scale.  Consult diabetic educator.  Hyperlipidemia.  Continue Lopid.  AbNormal UA but asymptomatic no need for treatment.  Elevated lactic acid.  Resolved.  Elevated troponin.  No angina.  No congestive heart failure.  VTE prophylaxis.  Sequential compression devices.      Discussed my findings and plan of treatment with patient and nurse.  Patient states that he wants to leave AGAINST MEDICAL ADVICE.  I counseled the patient about the risks of controlled hyperglycemia with recurrence of hyperkalemia and hypotension.  He continues to insist that he is leaving.           Samanta Ochoa MD  [facility]  12/02/23  10:46 EST

## 2023-12-02 NOTE — H&P
Patient Name:  Brian Christensen  YOB: 1950  MRN:  [id number]  Admit Date:  2023  Patient Care Team:  Ha Bear MD as PCP - General (Internal Medicine)      Subjective   History Present Illness     Chief Complaint   Patient presents with    Hypotension           History of Present Illness    Patient is a 33-year-old male with type II DM, hypertension, hyperlipidemia, presented to the ED for evaluation of hypotension.  Patient was seen by PCP today for a regular checkup, and his systolic blood pressure was found to be in 70s.  Surprisingly patient himself denies any complaints, denies physical dizziness/lightheaded, denies any falls.  No chest pain or palpitations.  Denies any dehydration, nausea vomiting or diarrhea or increased urinary frequency.  Reports he has been taking his blood pressure medication lisinopril/HCTZ as prescribed.  Reports also has been taking his diabetes medications as well, however his blood glucose has been running high in the 200-300s.           Review of Systems   GENERAL: No fevers, chills, sweats.    RESPIRATORY: No cough, shortness of breath, hemoptysis or wheezing.   CVS: No chest pain, palpitations, orthopnea, dyspnea on exertion   GI: No melena or hematochezia. No abdominal pain. No nausea, vomiting, constipation, diarrhea    Personal History     Past Medical History:   Diagnosis Date    Arthritis     Carpal boss, left     Coronary artery disease     Diabetes mellitus     Heartburn     Hyperlipidemia     Hypertension     Leg fracture, right      Past Surgical History:   Procedure Laterality Date    COLONOSCOPY      FRACTURE SURGERY      TONSILLECTOMY       Family History   Problem Relation Age of Onset    Prostate cancer Father      Social History     Tobacco Use    Smoking status: Former     Packs/day: 1.00     Years: 20.00     Additional pack years: 0.00     Total pack years: 20.00     Types: Cigarettes     Quit date:      Years since quittin.9     Smokeless tobacco: Never   Vaping Use    Vaping Use: Never used   Substance Use Topics    Alcohol use: Not Currently    Drug use: Not Currently     No current facility-administered medications on file prior to encounter.     Current Outpatient Medications on File Prior to Encounter   Medication Sig Dispense Refill    Blood Glucose Monitoring Suppl (Blood Glucose Monitor System) w/Device kit Use to check finger stick blood sugar _ times per day. 1 each 0    gemfibrozil (LOPID) 600 MG tablet TAKE 1 (ONE) TABLET BY MOUTH TWICE DAILY 60 tablet 0    Glucose Blood (Blood Glucose Test) strip Use with blood glucose meter to check blood sugar 5 times per day. 100 each 2    HumuLIN N 100 UNIT/ML injection Inject 85 Units under the skin into the appropriate area as directed 2 (Two) Times a Day Before Meals. 155 mL 0    HumuLIN R 100 UNIT/ML injection Inject 10 Units under the skin into the appropriate area as directed 3 (Three) Times a Day Before Meals. 10 mL 2    ibuprofen (ADVIL,MOTRIN) 600 MG tablet TAKE 1 (ONE) TABLET BY MOUTH EVERY 8 HOURS 90 tablet 0    Insulin Syringes, Disposable, U-100 0.5 ML misc Use needle 5 times daily for injection into the skin of the abdomen or upper outside of the thighs.   Do not reuse needles. 100 each 2    Lancets Thin misc Use with lancet device to check blood glucose 5 times per day via finger stick. 100 each 3    lisinopril-hydrochlorothiazide (PRINZIDE,ZESTORETIC) 20-25 MG per tablet Take 1 tablet by mouth Daily. 90 tablet 2    metFORMIN (GLUCOPHAGE) 1000 MG tablet Take 1 tablet by mouth 2 (Two) Times a Day With Meals. 180 tablet 2    miconazole (MICOTIN) 2 % cream Apply 1 application  topically to the appropriate area as directed 2 (Two) Times a Day. Skin fold      oxymetazoline (AFRIN) 0.05 % nasal spray 2 sprays into the nostril(s) as directed by provider Daily.      pioglitazone (ACTOS) 30 MG tablet Take 1 tablet by mouth Daily. 90 tablet 2    albuterol sulfate  (90 Base)  MCG/ACT inhaler Inhale 1 puff Every 4 (Four) Hours As Needed for Wheezing. 6.7 g 3     No Known Allergies    Objective    Objective     Vital Signs  Temp:  [96.9 °F (36.1 °C)-97.7 °F (36.5 °C)] 97.7 °F (36.5 °C)  Heart Rate:  [] 88  Resp:  [20] 20  BP: ()/(56-71) 113/56  SpO2:  [93 %-97 %] 97 %  on   ;   Device (Oxygen Therapy): room air  Body mass index is 43.01 kg/m².    Physical Exam    Results Review:  I reviewed the patient's new clinical results.  I reviewed the patient's new imaging results and agree with the interpretation.  I reviewed the patient's other test results and agree with the interpretation  I personally viewed and interpreted the patient's EKG/Telemetry data  Discussed with ED provider.    Lab Results (last 24 hours)       Procedure Component Value Units Date/Time    POC Glucose Once [[id number]]  (Abnormal) Collected: 12/01/23 1510    Specimen: Blood Updated: 12/01/23 1512     Glucose 481 mg/dL     CBC & Differential [[id number]]  (Normal) Collected: 12/01/23 1650    Specimen: Blood Updated: 12/01/23 1712    Narrative:      The following orders were created for panel order CBC & Differential.  Procedure                               Abnormality         Status                     ---------                               -----------         ------                     CBC Auto Differential[[id number]]        Normal              Final result                 Please view results for these tests on the individual orders.    Comprehensive Metabolic Panel [[id number]]  (Abnormal) Collected: 12/01/23 1650    Specimen: Blood Updated: 12/01/23 1740     Glucose 452 mg/dL      BUN 38 mg/dL      Creatinine 1.69 mg/dL      Sodium 130 mmol/L      Potassium 5.5 mmol/L      Chloride 92 mmol/L      CO2 24.0 mmol/L      Calcium 11.0 mg/dL      Total Protein 7.8 g/dL      Albumin 4.6 g/dL      ALT (SGPT) 31 U/L      AST (SGOT) 19 U/L      Alkaline Phosphatase 65 U/L      Total Bilirubin 0.5 mg/dL      Globulin  3.2 gm/dL      A/G Ratio 1.4 g/dL      BUN/Creatinine Ratio 22.5     Anion Gap 14.0 mmol/L      eGFR 42.6 mL/min/1.73     Narrative:      GFR Normal >60  Chronic Kidney Disease <60  Kidney Failure <15    The GFR formula is only valid for adults with stable renal function between ages 18 and 70.    Blood Culture - Blood, Arm, Left [[id number]] Collected: 12/01/23 1650    Specimen: Blood from Arm, Left Updated: 12/01/23 1705    Lactic Acid, Plasma [[id number]]  (Abnormal) Collected: 12/01/23 1650    Specimen: Blood Updated: 12/01/23 1739     Lactate 3.1 mmol/L     CBC Auto Differential [[id number]]  (Normal) Collected: 12/01/23 1650    Specimen: Blood Updated: 12/01/23 1712     WBC 7.59 10*3/mm3      RBC 5.17 10*6/mm3      Hemoglobin 14.5 g/dL      Hematocrit 46.0 %      MCV 89.0 fL      MCH 28.0 pg      MCHC 31.5 g/dL      RDW 13.2 %      RDW-SD 43.2 fl      MPV 10.8 fL      Platelets 389 10*3/mm3      Neutrophil % 64.7 %      Lymphocyte % 23.7 %      Monocyte % 9.5 %      Eosinophil % 1.1 %      Basophil % 0.7 %      Immature Grans % 0.3 %      Neutrophils, Absolute 4.92 10*3/mm3      Lymphocytes, Absolute 1.80 10*3/mm3      Monocytes, Absolute 0.72 10*3/mm3      Eosinophils, Absolute 0.08 10*3/mm3      Basophils, Absolute 0.05 10*3/mm3      Immature Grans, Absolute 0.02 10*3/mm3      nRBC 0.0 /100 WBC     Single High Sensitivity Troponin T [[id number]]  (Abnormal) Collected: 12/01/23 1650    Specimen: Blood Updated: 12/01/23 1752     HS Troponin T 56 ng/L     Narrative:      High Sensitive Troponin T Reference Range:  <14.0 ng/L- Negative Female for AMI  <22.0 ng/L- Negative Male for AMI  >=14 - Abnormal Female indicating possible myocardial injury.  >=22 - Abnormal Male indicating possible myocardial injury.   Clinicians would have to utilize clinical acumen, EKG, Troponin, and serial changes to determine if it is an Acute Myocardial Infarction or myocardial injury due to an underlying chronic condition.          Cortisol [[id number]] Collected: 12/01/23 1650    Specimen: Blood Updated: 12/01/23 2006     Cortisol 23.20 mcg/dL     Narrative:      Cortisol Reference Ranges:    Cortisol 6AM - 10AM Range: 6.02-18.40 mcg/dl  Cortisol 4PM - 8PM Range: 2.68-10.50 mcg/dl      Results may be falsely increased if patient taking Biotin.      POC Glucose Once [[id number]]  (Abnormal) Collected: 12/01/23 1712    Specimen: Blood Updated: 12/01/23 1714     Glucose 376 mg/dL     Blood Culture - Blood, Arm, Left [[id number]] Collected: 12/01/23 1728    Specimen: Blood from Arm, Left Updated: 12/01/23 1732    Urinalysis With Culture If Indicated - Urine, Clean Catch [[id number]]  (Abnormal) Collected: 12/01/23 1836    Specimen: Urine, Clean Catch Updated: 12/01/23 1857     Color, UA Yellow     Appearance, UA Cloudy     pH, UA <=5.0     Specific Gravity, UA 1.024     Glucose, UA >=1000 mg/dL (3+)     Ketones, UA Negative     Bilirubin, UA Negative     Blood, UA Negative     Protein, UA 30 mg/dL (1+)     Leuk Esterase, UA Moderate (2+)     Nitrite, UA Negative     Urobilinogen, UA 0.2 E.U./dL    Narrative:      In absence of clinical symptoms, the presence of pyuria, bacteria, and/or nitrites on the urinalysis result does not correlate with infection.    Urinalysis, Microscopic Only - Urine, Clean Catch [[id number]]  (Abnormal) Collected: 12/01/23 1836    Specimen: Urine, Clean Catch Updated: 12/01/23 1906     RBC, UA 0-2 /HPF      WBC, UA Too Numerous to Count /HPF      Bacteria, UA 1+ /HPF      Squamous Epithelial Cells, UA 3-6 /HPF      Hyaline Casts, UA 0-2 /LPF      Methodology Manual Light Microscopy    Urine Culture - Urine, Urine, Clean Catch [[id number]] Collected: 12/01/23 1836    Specimen: Urine, Clean Catch Updated: 12/01/23 1906    STAT Lactic Acid, Reflex [[id number]]  (Normal) Collected: 12/01/23 1955    Specimen: Blood Updated: 12/01/23 2024     Lactate 1.9 mmol/L     High Sensitivity Troponin T 2Hr [[id number]]  (Abnormal) Collected:  12/01/23 1955    Specimen: Blood Updated: 12/01/23 2024     HS Troponin T 47 ng/L      Troponin T Delta -9 ng/L     Narrative:      High Sensitive Troponin T Reference Range:  <14.0 ng/L- Negative Female for AMI  <22.0 ng/L- Negative Male for AMI  >=14 - Abnormal Female indicating possible myocardial injury.  >=22 - Abnormal Male indicating possible myocardial injury.   Clinicians would have to utilize clinical acumen, EKG, Troponin, and serial changes to determine if it is an Acute Myocardial Infarction or myocardial injury due to an underlying chronic condition.         Basic Metabolic Panel [[id number]]  (Abnormal) Collected: 12/01/23 1955    Specimen: Blood Updated: 12/01/23 2302     Glucose 374 mg/dL      BUN 37 mg/dL      Creatinine 1.38 mg/dL      Sodium 133 mmol/L      Potassium 4.9 mmol/L      Chloride 96 mmol/L      CO2 19.8 mmol/L      Calcium 9.9 mg/dL      BUN/Creatinine Ratio 26.8     Anion Gap 17.2 mmol/L      eGFR 54.3 mL/min/1.73     Narrative:      GFR Normal >60  Chronic Kidney Disease <60  Kidney Failure <15    The GFR formula is only valid for adults with stable renal function between ages 18 and 70.    POC Glucose Once [[id number]]  (Abnormal) Collected: 12/01/23 2109    Specimen: Blood Updated: 12/01/23 2111     Glucose 369 mg/dL             Imaging Results (Last 24 Hours)       Procedure Component Value Units Date/Time    XR Chest 1 View [[id number]] Collected: 12/01/23 1716     Updated: 12/01/23 1726    Narrative:      XR CHEST 1 VW-12/1/2023     HISTORY: Weakness. Fatigue.     Heart size is within normal limits.     2 images are submitted. Bony structures appear unremarkable.     Lungs appear clear.       Impression:      No acute process.     This report was finalized on 12/1/2023 5:23 PM by Dr. Titus Lujan M.D on Workstation: [id number]                   ECG 12 Lead Altered Mental Status   Final Result   HEART RATE= 97  bpm   RR Interval= 619  ms   DE Interval= 167  ms   P Horizontal  Axis= 14  deg   P Front Axis= 49  deg   QRSD Interval= 102  ms   QT Interval= 340  ms   QTcB= 432  ms   QRS Axis= -60  deg   T Wave Axis= 24  deg   - ABNORMAL ECG -   Sinus rhythm   Abnormal R-wave progression, late transition   Inferior infarct, old   Lateral leads are also involved   No Prior Tracing for Comparison   Electronically Signed By: Jason Braswell ([facility]) 01-Dec-2023 17:52:15   Date and Time of Study: 2023-12-01 16:31:21           Assessment/Plan     Active Hospital Problems    Diagnosis  POA    **Hyperglycemia [R73.9]  Yes    RAUL (acute kidney injury) [N17.9]  Unknown    Elevated troponin [R79.89]  Unknown    Hypotension [I95.9]  Unknown    Hyperkalemia [E87.5]  Unknown    Type 2 diabetes mellitus with hyperglycemia, with long-term current use of insulin [E11.65, Z79.4]  Not Applicable    Hypertension [I10]  Yes    Hyperlipidemia [E78.5]  Yes      Resolved Hospital Problems   No resolved problems to display.     Patient is a 33-year-old male with type II DM, hypertension, hyperlipidemia, presented to the ED from PCP clinic after blood pressure was found to be low with systolic in 70s.    Hypotension:   -Patient's blood pressure was as low as 74/56 with heart rate of 113  -Presently patient with no significant symptoms  --Received fluid bolus in the ED with improvement of blood pressure.  -Patient is afebrile, WBC normal, no signs of infection.  No suspicion for sepsis  -Lactic acid was elevated at 3.1, normalized with IV fluids at 1.9.  Stable to dehydration.  -Hypotension was  secondary to dehydration in the setting of uncontrolled diabetes with elevated blood glucose leading to increased diuresis and ongoing use of lisinopril-HCTZ which patient reported taking as scheduled.    -With hyponatremia, hyperkalemia, and hypotension adrenal sufficiency was on differentials.  However unlikely adrenal insufficiency as patient with no abdominal symptoms, was hyponatremic but corrected for hyperglycemia normal  sodium, and random cortisol was high at 23.2.  Symptoms improved with IV fluids again making adrenal insufficiency again unlikely.  -Status post IV fluid bolus, continue IV fluids.  BP normalized.  Hold lisinopril-HCTZ      RAUL/hyperkalemia: Prerenal secondary to her dehydration, creatinine was 1.69 on admission, up from 1.21 on 08/23.  Ordered Lokelma once, IV fluids.  Repeat BMP with normalized potassium improving creatinine.  Monitor daily BMP, continue IV fluids.    Type II DM with hyperglycemia-blood glucose elevated up to 400s on admission, last hemoglobin on 08/21/2023 was elevated at 11.1.  Initial CO2 and anion gap was largely unremarkable, however repeat BMP CO2 trending down and anion gap increasing.  Ordered ABG to evaluate for possible developing DKA.  Urinalysis on admission with no ketones.  Continue IV fluids.  Started on Lantus 25 units twice daily, lispro 6 units 3 times daily with meals, sliding scale insulin.  At home on Humulin N 85 units twice daily, Premeal Artane scheduled with meals, metformin, pioglitazone        Hyperlipidemia: On Lopid 600 twice daily at home, nonformulary.  Ordered as nonformulary for patient to  supply if available.      Elevated troponin: Initial high-sensitivity troponin was elevated at 56, repeat 1 trended down at 47.  Likely mildly elevated in the setting of RAUL.  EKG showed sinus rhythm, no acute ischemic changes.  Per chart review CAD was listed as in past medical history, however patient adamantly denies any history of CAD, heart attack, stent placement.  Monitor  on telemetry.        I discussed the patient's findings and my recommendations with patient, nursing staff, and ED provider.    VTE Prophylaxis - SCDs.  Code Status -DNR/DNI       Jamie Su MD  [facility]  12/02/23  01:27 EST

## 2023-12-03 ENCOUNTER — READMISSION MANAGEMENT (OUTPATIENT)
Dept: CALL CENTER | Facility: HOSPITAL | Age: 73
End: 2023-12-03
Payer: MEDICARE

## 2023-12-03 VITALS
HEART RATE: 82 BPM | SYSTOLIC BLOOD PRESSURE: 99 MMHG | TEMPERATURE: 98.2 F | RESPIRATION RATE: 18 BRPM | DIASTOLIC BLOOD PRESSURE: 68 MMHG | HEIGHT: 69 IN | WEIGHT: 281.4 LBS | OXYGEN SATURATION: 95 % | BODY MASS INDEX: 41.68 KG/M2

## 2023-12-03 PROBLEM — E87.5 HYPERKALEMIA: Status: RESOLVED | Noted: 2023-12-01 | Resolved: 2023-12-03

## 2023-12-03 PROBLEM — I95.9 HYPOTENSION: Status: RESOLVED | Noted: 2023-12-01 | Resolved: 2023-12-03

## 2023-12-03 PROBLEM — N17.9 AKI (ACUTE KIDNEY INJURY): Status: RESOLVED | Noted: 2023-12-01 | Resolved: 2023-12-03

## 2023-12-03 LAB
ANION GAP SERPL CALCULATED.3IONS-SCNC: 9.2 MMOL/L (ref 5–15)
BASOPHILS # BLD AUTO: 0.04 10*3/MM3 (ref 0–0.2)
BASOPHILS NFR BLD AUTO: 0.6 % (ref 0–1.5)
BUN SERPL-MCNC: 23 MG/DL (ref 8–23)
BUN/CREAT SERPL: 23.2 (ref 7–25)
CALCIUM SPEC-SCNC: 9.3 MG/DL (ref 8.6–10.5)
CHLORIDE SERPL-SCNC: 100 MMOL/L (ref 98–107)
CO2 SERPL-SCNC: 22.8 MMOL/L (ref 22–29)
CREAT SERPL-MCNC: 0.99 MG/DL (ref 0.76–1.27)
DEPRECATED RDW RBC AUTO: 42 FL (ref 37–54)
EGFRCR SERPLBLD CKD-EPI 2021: 80.9 ML/MIN/1.73
EOSINOPHIL # BLD AUTO: 0.16 10*3/MM3 (ref 0–0.4)
EOSINOPHIL NFR BLD AUTO: 2.5 % (ref 0.3–6.2)
ERYTHROCYTE [DISTWIDTH] IN BLOOD BY AUTOMATED COUNT: 13.2 % (ref 12.3–15.4)
GLUCOSE BLDC GLUCOMTR-MCNC: 172 MG/DL (ref 70–130)
GLUCOSE BLDC GLUCOMTR-MCNC: 179 MG/DL (ref 70–130)
GLUCOSE BLDC GLUCOMTR-MCNC: 267 MG/DL (ref 70–130)
GLUCOSE SERPL-MCNC: 218 MG/DL (ref 65–99)
HCT VFR BLD AUTO: 40.1 % (ref 37.5–51)
HGB BLD-MCNC: 13.3 G/DL (ref 13–17.7)
IMM GRANULOCYTES # BLD AUTO: 0.02 10*3/MM3 (ref 0–0.05)
IMM GRANULOCYTES NFR BLD AUTO: 0.3 % (ref 0–0.5)
LYMPHOCYTES # BLD AUTO: 2.2 10*3/MM3 (ref 0.7–3.1)
LYMPHOCYTES NFR BLD AUTO: 33.7 % (ref 19.6–45.3)
MCH RBC QN AUTO: 29.1 PG (ref 26.6–33)
MCHC RBC AUTO-ENTMCNC: 33.2 G/DL (ref 31.5–35.7)
MCV RBC AUTO: 87.7 FL (ref 79–97)
MONOCYTES # BLD AUTO: 0.59 10*3/MM3 (ref 0.1–0.9)
MONOCYTES NFR BLD AUTO: 9 % (ref 5–12)
NEUTROPHILS NFR BLD AUTO: 3.52 10*3/MM3 (ref 1.7–7)
NEUTROPHILS NFR BLD AUTO: 53.9 % (ref 42.7–76)
NRBC BLD AUTO-RTO: 0 /100 WBC (ref 0–0.2)
PLATELET # BLD AUTO: 310 10*3/MM3 (ref 140–450)
PMV BLD AUTO: 10.5 FL (ref 6–12)
POTASSIUM SERPL-SCNC: 4.1 MMOL/L (ref 3.5–5.2)
RBC # BLD AUTO: 4.57 10*6/MM3 (ref 4.14–5.8)
SODIUM SERPL-SCNC: 132 MMOL/L (ref 136–145)
WBC NRBC COR # BLD AUTO: 6.53 10*3/MM3 (ref 3.4–10.8)

## 2023-12-03 PROCEDURE — 63710000001 INSULIN LISPRO (HUMAN) PER 5 UNITS: Performed by: INTERNAL MEDICINE

## 2023-12-03 PROCEDURE — 63710000001 INSULIN LISPRO (HUMAN) PER 5 UNITS: Performed by: STUDENT IN AN ORGANIZED HEALTH CARE EDUCATION/TRAINING PROGRAM

## 2023-12-03 PROCEDURE — 96361 HYDRATE IV INFUSION ADD-ON: CPT

## 2023-12-03 PROCEDURE — 85025 COMPLETE CBC W/AUTO DIFF WBC: CPT | Performed by: INTERNAL MEDICINE

## 2023-12-03 PROCEDURE — G0378 HOSPITAL OBSERVATION PER HR: HCPCS

## 2023-12-03 PROCEDURE — 82948 REAGENT STRIP/BLOOD GLUCOSE: CPT

## 2023-12-03 PROCEDURE — 25810000003 SODIUM CHLORIDE 0.9 % SOLUTION: Performed by: STUDENT IN AN ORGANIZED HEALTH CARE EDUCATION/TRAINING PROGRAM

## 2023-12-03 PROCEDURE — 63710000001 INSULIN GLARGINE PER 5 UNITS: Performed by: INTERNAL MEDICINE

## 2023-12-03 PROCEDURE — 80048 BASIC METABOLIC PNL TOTAL CA: CPT | Performed by: INTERNAL MEDICINE

## 2023-12-03 RX ORDER — PEN NEEDLE, DIABETIC 30 GX3/16"
1 NEEDLE, DISPOSABLE MISCELLANEOUS 2 TIMES DAILY
Qty: 100 EACH | Refills: 0 | Status: SHIPPED | OUTPATIENT
Start: 2023-12-03

## 2023-12-03 RX ADMIN — SODIUM CHLORIDE 100 ML/HR: 9 INJECTION, SOLUTION INTRAVENOUS at 05:34

## 2023-12-03 RX ADMIN — OXYMETAZOLINE HYDROCHLORIDE 2 SPRAY: 0.05 SPRAY NASAL at 08:49

## 2023-12-03 RX ADMIN — INSULIN GLARGINE 40 UNITS: 100 INJECTION, SOLUTION SUBCUTANEOUS at 08:49

## 2023-12-03 RX ADMIN — ACETAMINOPHEN 650 MG: 325 TABLET, FILM COATED ORAL at 05:42

## 2023-12-03 RX ADMIN — INSULIN LISPRO 2 UNITS: 100 INJECTION, SOLUTION INTRAVENOUS; SUBCUTANEOUS at 08:49

## 2023-12-03 RX ADMIN — INSULIN LISPRO 10 UNITS: 100 INJECTION, SOLUTION INTRAVENOUS; SUBCUTANEOUS at 08:49

## 2023-12-03 NOTE — CASE MANAGEMENT/SOCIAL WORK
Case Management Discharge Note      Final Note: Pt discharged home.  TATO Rucker RN         Selected Continued Care - Discharged on 12/3/2023 Admission date: 12/1/2023 - Discharge disposition: Home or Self Care      Destination    No services have been selected for the patient.                Durable Medical Equipment    No services have been selected for the patient.                Dialysis/Infusion    No services have been selected for the patient.                Home Medical Care    No services have been selected for the patient.                Therapy    No services have been selected for the patient.                Community Resources    No services have been selected for the patient.                Community & DME    No services have been selected for the patient.                    Transportation Services  Private: Car    Final Discharge Disposition Code: 01 - home or self-care

## 2023-12-03 NOTE — DISCHARGE SUMMARY
Patient Name: Brian Christensen  : 1950  MRN: [id number]    Date of Admission: 2023  Date of Discharge:  12/3/2023  Primary Care Physician: Ha Bear MD      Discharge Diagnoses     Active Hospital Problems    Diagnosis  POA    **Hyperglycemia [R73.9]  Yes    Elevated troponin [R79.89]  Yes    Type 2 diabetes mellitus with hyperglycemia, with long-term current use of insulin [E11.65, Z79.4]  Not Applicable    Hypertension [I10]  Yes    Hyperlipidemia [E78.5]  Yes      Resolved Hospital Problems    Diagnosis Date Resolved POA    RAUL (acute kidney injury) [N17.9] 2023 Yes    Hypotension [I95.9] 2023 Yes    Hyperkalemia [E87.5] 2023 Yes        Hospital Course     Brief admission history and physical.  Please refer to the H&P for full detail.  A pleasant 73 years old gentleman with a past history of type II insulin requiring diabetes/hypertension/dyslipidemia who presented to the emergency department from his primary care physician office because of low blood pressure.  Patient himself denies any complaint.  Physical examination on admission included an afebrile patient with stable vital signs.  Obesity.  Hospital course.  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  Chest x-ray with no acute disease.  CBC otherwise normal.  High-sensitivity troponin elevated at 56.  Lactic acid elevated at 3.1.  CMP normal except random blood sugar of 452, BUN 38, creatinine 1.69, sodium 130, potassium 5.5, chloride 92, calcium 11.  Patient was admitted with hypotension and has a history of hypertension.  The hypotension was thought to be secondary to hyperosmolar status and dehydration because of uncontrolled blood pressure in a patient who is taking lisinopril HCTZ.  Antihypertensives were held and the patient was started on IV fluid and the hypotension has resolved.  He was noted to have an acute kidney  failure/hypercalcemia/hyponatremia/hyperkalemia.  Acute kidney failure and hyponatremia resolved with IV fluid.  Hyperkalemia resolved with Lokelma.  Hypercalcemia resolved with IV fluid.  And the patient was euvolemic at the time of discharge with normal renal function.  Patient was found to have uncontrolled type 2 diabetes A1c was 11.7.  His insulin regimen was changed to Lantus with continuation of his regular insulin before each meal and addition to a sliding scale and this has improved his blood sugar.  He was educated about diabetes during this admission.  At the time of discharge he is to continue his Lantus twice daily/AC regular insulin with resumption of his Actos/metformin.  He is to follow-up with primary MD for further adjustment of his insulin.  For his dyslipidemia he was maintained on Lopid.  He was noted to have an abnormal UA and urine culture were negative and he was asymptomatic and no treatment was employed.  His blood cultures came back negative.  Lactic acid was elevated without evidence of infection this has resolved with IV fluid.  He was noted to have elevated troponin which was thought to be secondary to the renal failure there was no evidence of angina or congestive heart failure.  At the time of discharge patient was hemodynamically stable  Consultants     Consult Orders (all) (From admission, onward)       Start     Ordered    12/02/23 0900  Inpatient Diabetes Educator Consult  Once        Provider:  (Not yet assigned)    12/02/23 0130    12/01/23 1756  [facility] (on-call MD unless specified) Details  Once        Specialty:  Hospitalist  Provider:  (Not yet assigned)    12/01/23 1755                  Procedures     Imaging Results (All)       Procedure Component Value Units Date/Time    XR Chest 1 View [[id number]] Collected: 12/01/23 1716     Updated: 12/01/23 1726    Narrative:      XR CHEST 1 VW-12/1/2023     HISTORY: Weakness. Fatigue.     Heart size is within normal limits.     2 images  "are submitted. Bony structures appear unremarkable.     Lungs appear clear.       Impression:      No acute process.     This report was finalized on 12/1/2023 5:23 PM by Dr. Titus Lujan M.D on Workstation: [id number]               Pertinent Labs     Results from last 7 days   Lab Units 12/03/23  0609 12/02/23  0615 12/01/23  1650   WBC 10*3/mm3 6.53 5.94 7.59   HEMOGLOBIN g/dL 13.3 13.1 14.5   PLATELETS 10*3/mm3 310 311 389     Results from last 7 days   Lab Units 12/03/23  0609 12/02/23  0615 12/01/23 1955 12/01/23  1650   SODIUM mmol/L 132* 131* 133* 130*   POTASSIUM mmol/L 4.1 4.6 4.9 5.5*   CHLORIDE mmol/L 100 97* 96* 92*   CO2 mmol/L 22.8 23.7 19.8* 24.0   BUN mg/dL 23 35* 37* 38*   CREATININE mg/dL 0.99 1.16 1.38* 1.69*   GLUCOSE mg/dL 218* 426* 374* 452*   Estimated Creatinine Clearance: 89.3 mL/min (by C-G formula based on SCr of 0.99 mg/dL).  Results from last 7 days   Lab Units 12/01/23  1650   ALBUMIN g/dL 4.6   BILIRUBIN mg/dL 0.5   ALK PHOS U/L 65   AST (SGOT) U/L 19   ALT (SGPT) U/L 31     Results from last 7 days   Lab Units 12/03/23  0609 12/02/23  0615 12/01/23 1955 12/01/23  1650   CALCIUM mg/dL 9.3 9.9 9.9 11.0*   ALBUMIN g/dL  --   --   --  4.6   MAGNESIUM mg/dL  --  1.6  --   --    PHOSPHORUS mg/dL  --  3.4  --   --        Results from last 7 days   Lab Units 12/01/23 1955 12/01/23  1650   HSTROP T ng/L 47* 56*           Invalid input(s): \"LDLCALC\"  Results from last 7 days   Lab Units 12/01/23  1836 12/01/23  1728 12/01/23  1650   BLOODCX   --  No growth at 24 hours No growth at 24 hours   URINECX  25,000 CFU/mL Normal Urogenital Kristy  --   --      Imaging Results (Last 24 Hours)       ** No results found for the last 24 hours. **            Test Results Pending at Discharge     Pending Labs       Order Current Status    Blood Culture - Blood, Arm, Left Preliminary result    Blood Culture - Blood, Arm, Left Preliminary result              Discharge Exam   Physical Exam  Vitals " temperature 98.2 a pulse of 82 respirate rate of 18 blood pressure 99/68 and O2 sats of 95% on room air  General.  Elderly gentleman.  Obese.  Alert and oriented x 3.  No apparent pain/distress/diaphoresis.  Normal mood and affect.  Eyes.  Pupils equal round and reactive.  Intact extraocular musculature.  No pallor or jaundice.  Oral cavity.  Moist mucous membrane.  Neck.  Supple.  No JVD.  No lymphadenopathy or thyromegaly.  Cardiovascular.  Regular rate and rhythm with grade 2 systolic murmur.  Chest.  Clear to auscultation bilaterally with no added sounds  Abdomen.  Obese.  Soft lax.  No tenderness.  No organomegaly.  No guarding or rebound.  Extremities.  No clubbing/cyanosis/edema.  CNS.  No acute focal neurological deficits.  Discharge Details        Discharge Medications        New Medications        Instructions Start Date   insulin glargine 100 UNIT/ML injection  Commonly known as: LANTUS, SEMGLEE   40 Units, Subcutaneous, 2 Times Daily             Continue These Medications        Instructions Start Date   albuterol sulfate  (90 Base) MCG/ACT inhaler  Commonly known as: PROVENTIL HFA;VENTOLIN HFA;PROAIR HFA   1 puff, Inhalation, Every 4 Hours PRN      Blood Glucose Monitor System w/Device kit   Use to check finger stick blood sugar _ times per day.      Blood Glucose Test strip   Use with blood glucose meter to check blood sugar 5 times per day.      gemfibrozil 600 MG tablet  Commonly known as: LOPID   600 mg, Oral, 2 Times Daily      HumuLIN R 100 UNIT/ML injection  Generic drug: insulin regular   10 Units, Subcutaneous, 3 Times Daily Before Meals      Insulin Syringes (Disposable) U-100 0.5 ML misc   Use needle 5 times daily for injection into the skin of the abdomen or upper outside of the thighs.  Do not reuse needles.      Lancets Thin misc   Use with lancet device to check blood glucose 5 times per day via finger stick.      metFORMIN 1000 MG tablet  Commonly known as: GLUCOPHAGE   1,000 mg,  Oral, 2 Times Daily With Meals      miconazole 2 % cream  Commonly known as: MICOTIN   1 application , Topical, 2 Times Daily, Skin fold      pioglitazone 30 MG tablet  Commonly known as: ACTOS   30 mg, Oral, Daily             Stop These Medications      HumuLIN N 100 UNIT/ML injection  Generic drug: insulin NPH     ibuprofen 600 MG tablet  Commonly known as: ADVIL,MOTRIN     lisinopril-hydrochlorothiazide 20-25 MG per tablet  Commonly known as: PRINZIDE,ZESTORETIC     oxymetazoline 0.05 % nasal spray  Commonly known as: AFRIN              No Known Allergies      Discharge Disposition:  Condition: Stable    Diet:   Diet Order   Procedures    Diet: Cardiac Diets, Diabetic Diets; Healthy Heart (2-3 Na+); Consistent Carbohydrate; Texture: Regular Texture (IDDSI 7); Fluid Consistency: Thin (IDDSI 0)       Activity:   Activity Instructions       Activity as Tolerated                  CODE STATUS:    Code Status and Medical Interventions:   Ordered at: 12/01/23 1945     Medical Intervention Limits:    NO intubation (DNI)     Code Status (Patient has no pulse and is not breathing):    No CPR (Do Not Attempt to Resuscitate)     Medical Interventions (Patient has pulse or is breathing):    Limited Support       Future Appointments   Date Time Provider Department Center   12/4/2023 10:00 AM [facility]   12/6/2023 11:45 AM Ha Bear MD [facility]     Additional Instructions for the Follow-ups that You Need to Schedule       Call MD With Problems / Concerns   As directed      Instructions: Call MD or return to ER if dizziness/loss of consciousness/focal neurological symptoms/chest pain/shortness of breath/palpitation    Order Comments: Instructions: Call MD or return to ER if dizziness/loss of consciousness/focal neurological symptoms/chest pain/shortness of breath/palpitation         Discharge Follow-up with PCP   As directed       Currently Documented PCP:    Ha Bear MD    PCP Phone  Number:    [phone]     Follow Up Details: Primary MD.  1 week.  Hypotension/acute kidney failure/electrolyte imbalance/uncontrolled type 2 diabetes/dyslipidemia/abnormal UA               Follow-up Information       Ha Bear MD .    Specialty: Internal Medicine  Why: Primary MD.  1 week.  Hypotension/acute kidney failure/electrolyte imbalance/uncontrolled type 2 diabetes/dyslipidemia/abnormal UA  Contact information:  [address] [address]  [phone]                               Time Spent on Discharge:  Greater than 30 minutes      Samanta Ochoa MD  [facility]  12/03/23  08:28 EST

## 2023-12-03 NOTE — PLAN OF CARE
Goal Outcome Evaluation:   Patient alert follows commands no c/o pain or nausea noted. Iv fluids infusing. Blood glucose monitored. No acute distress noted will continue to monitor

## 2023-12-03 NOTE — OUTREACH NOTE
Prep Survey      Flowsheet Row Responses   [facility] patient discharged from? [place]   Is LACE score < 7 ? Yes   Eligibility [facility]   Date of Admission 12/01/23   Date of Discharge 12/03/23   Discharge Disposition Home or Self Care   Discharge diagnosis Hyperglycemia   Does the patient have one of the following disease processes/diagnoses(primary or secondary)? Other   Does the patient have Home health ordered? No   Is there a DME ordered? No   Prep survey completed? Yes            Anahy PUGA - Registered Nurse

## 2023-12-04 ENCOUNTER — TRANSITIONAL CARE MANAGEMENT TELEPHONE ENCOUNTER (OUTPATIENT)
Dept: CALL CENTER | Facility: HOSPITAL | Age: 73
End: 2023-12-04
Payer: MEDICARE

## 2023-12-04 NOTE — OUTREACH NOTE
Call Center TCM Note      Flowsheet Row Responses   [facility] patient discharged from? [place]   Does the patient have one of the following disease processes/diagnoses(primary or secondary)? Other   TCM attempt successful? No   Unsuccessful attempts Attempt 2            Bridget De La Rosa LPN    12/4/2023, 15:52 EST

## 2023-12-04 NOTE — OUTREACH NOTE
Call Center TCM Note      Flowsheet Row Responses   [facility] patient discharged from? [place]   Does the patient have one of the following disease processes/diagnoses(primary or secondary)? Other   TCM attempt successful? No   Unsuccessful attempts Attempt 1  [Person answering states it was not a good time and he had another call.]            Bridget De La Rosa LPN    12/4/2023, 11:45 EST

## 2023-12-05 ENCOUNTER — TRANSITIONAL CARE MANAGEMENT TELEPHONE ENCOUNTER (OUTPATIENT)
Dept: CALL CENTER | Facility: HOSPITAL | Age: 73
End: 2023-12-05
Payer: MEDICARE

## 2023-12-05 NOTE — OUTREACH NOTE
Call Center TCM Note      Flowsheet Row Responses   [facility] patient discharged from? [place]   Does the patient have one of the following disease processes/diagnoses(primary or secondary)? Other   TCM attempt successful? No   Unsuccessful attempts Attempt 3   Call Status Left message            Natalie Estevez RN    12/5/2023, 12:23 EST

## 2023-12-06 LAB
BACTERIA SPEC AEROBE CULT: NORMAL
BACTERIA SPEC AEROBE CULT: NORMAL

## 2023-12-06 NOTE — PROGRESS NOTES
"  Ha Bear M.D.  Internal Medicine  [facility]  [address], [address]  [place] [address]  [phone]      Chief Complaint  Hospital Follow Up Visit (1 week f/u /)    SUBJECTIVE    History of Present Illness    Brian Christensen is a 73 y.o. male with a past history of type II insulin requiring diabetes/hypertension/dyslipidemia who presents to the office today as an established patient that last saw me on 12/1/2023.     He is here for follow-up today.  At his last appointment his blood pressure was low.  He was referred to the emergency department.  His lactic acid was elevated at 3.1.  His blood glucose was very elevated at 452.  He had an RAUL with a creatinine of 1.69. Sodium 130, potassium 5.5, chloride 92, calcium 11.  Hypotension was thought to be secondary to hyperosmolar flutter status and dehydration because of hyperglycemia in addition to taking lisinopril-hydrochlorothiazide.  He was given fluids with improvement of lab abnormalities.    Feels fine today. States they \"put an IV in me\" and does nto remember otherwise. Does not have glucometer or log today. He never checks BG. Denies dysuria or polydipsia. He takes 40 U of Lantus BID. He is not sure if he is taking metformin. He is taking Actos. He is just taking Lantus. He reports good adherence.     For his diet he states he only had milk and a banana. Eats baked potato with dinner. Eats Turkey, pork chops, leftovers. He does the cooking at home. His wife is mostly bedbound. He uses a walker. May eat scrambled eggs or ham sandwhichs. Dinner is the biggest meal.     Taking ibuprofen TID for back pain. Tylenol garcia not help.     BP was 150/80 this morning. Checks 2-3 times/day. Sometimes BP is as low as 127/70.     Review of Systems    No Known Allergies     Outpatient Medications Marked as Taking for the 12/7/23 encounter (Office Visit) with Ha Bear MD   Medication Sig Dispense Refill    albuterol sulfate  (90 Base) " MCG/ACT inhaler Inhale 1 puff Every 4 (Four) Hours As Needed for Wheezing. 6.7 g 3    Blood Glucose Monitoring Suppl (Blood Glucose Monitor System) w/Device kit Use to check finger stick blood sugar _ times per day. 1 each 0    gemfibrozil (LOPID) 600 MG tablet TAKE 1 (ONE) TABLET BY MOUTH TWICE DAILY 60 tablet 0    Glucose Blood (Blood Glucose Test) strip Use with blood glucose meter to check blood sugar 5 times per day. 100 each 2    HumuLIN R 100 UNIT/ML injection Inject 10 Units under the skin into the appropriate area as directed 3 (Three) Times a Day Before Meals. 10 mL 2    Insulin Glargine (LANTUS SOLOSTAR) 100 UNIT/ML injection pen Inject 40 Units under the skin into the appropriate area as directed 2 (Two) Times a Day. 30 mL 12    Insulin Pen Needle (Pen Needles) 32G X 4 MM misc Use 1 each 2 (Two) Times a Day. 100 each 0    Insulin Syringes, Disposable, U-100 0.5 ML misc Use needle 5 times daily for injection into the skin of the abdomen or upper outside of the thighs.   Do not reuse needles. 100 each 2    Lancets Thin misc Use with lancet device to check blood glucose 5 times per day via finger stick. 100 each 3    metFORMIN (GLUCOPHAGE) 1000 MG tablet Take 1 tablet by mouth 2 (Two) Times a Day With Meals. 180 tablet 2    miconazole (MICOTIN) 2 % cream Apply 1 application  topically to the appropriate area as directed 2 (Two) Times a Day. Skin fold      pioglitazone (ACTOS) 30 MG tablet Take 1 tablet by mouth Daily. 90 tablet 2        Past Medical History:   Diagnosis Date    Arthritis     Carpal boss, left     Coronary artery disease     Diabetes mellitus     Heartburn     Hyperlipidemia     Hypertension     Leg fracture, right      Past Surgical History:   Procedure Laterality Date    COLONOSCOPY      FRACTURE SURGERY      TONSILLECTOMY       Family History   Problem Relation Age of Onset    Prostate cancer Father     reports that he quit smoking about 40 years ago. His smoking use included cigarettes.  "He has a 20.00 pack-year smoking history. He has never used smokeless tobacco. He reports that he does not currently use alcohol. He reports that he does not currently use drugs.    OBJECTIVE    Vital Signs:   /78   Pulse 105   Ht 175.3 cm (69.02\")   Wt 127 kg (281 lb)   SpO2 97%   BMI 41.48 kg/m²     Physical Exam  Constitutional:       Appearance: Normal appearance. He is normal weight.   Cardiovascular:      Rate and Rhythm: Normal rate and regular rhythm.      Heart sounds: Normal heart sounds. No murmur heard.  Pulmonary:      Effort: Pulmonary effort is normal.      Breath sounds: Normal breath sounds.   Abdominal:      General: Abdomen is flat. There is no distension.      Palpations: Abdomen is soft.      Tenderness: There is no abdominal tenderness.   Skin:     General: Skin is warm and dry.   Neurological:      Mental Status: He is alert.   Psychiatric:         Mood and Affect: Mood normal.         Behavior: Behavior normal.         Thought Content: Thought content normal.            The following data was reviewed by: Ha Bear MD on 12/07/2023:  CMP          12/1/2023    16:50 12/1/2023    19:55 12/2/2023    06:15 12/3/2023    06:09   CMP   Glucose 452  374  426  218    BUN 38  37  35  23    Creatinine 1.69  1.38  1.16  0.99    EGFR 42.6  54.3  66.9  80.9    Sodium 130  133  131  132    Potassium 5.5  4.9  4.6  4.1    Chloride 92  96  97  100    Calcium 11.0  9.9  9.9  9.3    Total Protein 7.8       Albumin 4.6       Globulin 3.2       Total Bilirubin 0.5       Alkaline Phosphatase 65       AST (SGOT) 19       ALT (SGPT) 31       Albumin/Globulin Ratio 1.4       BUN/Creatinine Ratio 22.5  26.8  30.2  23.2    Anion Gap 14.0  17.2  10.3  9.2      CBC w/diff          12/1/2023    16:50 12/2/2023    06:15 12/3/2023    06:09   CBC w/Diff   WBC 7.59  5.94  6.53    RBC 5.17  4.59  4.57    Hemoglobin 14.5  13.1  13.3    Hematocrit 46.0  40.3  40.1    MCV 89.0  87.8  87.7    MCH 28.0  28.5  29.1  "   MCHC 31.5  32.5  33.2    RDW 13.2  13.1  13.2    Platelets 389  311  310    Neutrophil Rel % 64.7   53.9    Immature Granulocyte Rel % 0.3   0.3    Lymphocyte Rel % 23.7   33.7    Monocyte Rel % 9.5   9.0    Eosinophil Rel % 1.1   2.5    Basophil Rel % 0.7   0.6      Lipid Panel          8/31/2023    15:09   Lipid Panel   Total Cholesterol 241    Triglycerides 348    HDL Cholesterol 34    VLDL Cholesterol 64    LDL Cholesterol  143        A1C Last 3 Results          8/31/2023    15:09 12/2/2023    06:15   HGBA1C Last 3 Results   Hemoglobin A1C 11.10  11.70             The 10-year ASCVD risk score (Patrizia HUANG, et al., 2019) is: 43.1%    Values used to calculate the score:      Age: 73 years      Sex: Male      Is Non- : No      Diabetic: Yes      Tobacco smoker: No      Systolic Blood Pressure: 127 mmHg      Is BP treated: No      HDL Cholesterol: 34 mg/dL      Total Cholesterol: 241 mg/dL        ASSESSMENT & PLAN     Diagnoses and all orders for this visit:    1. Type 2 diabetes mellitus with hyperglycemia, with long-term current use of insulin (Primary)  -     Continuous Blood Gluc Sensor (FreeStyle Ada Sensor System); Use Every 10 (Ten) Days.  Dispense: 4 each; Refill: 0  -     Basic Metabolic Panel    2. Primary hypertension    3. Hyperlipidemia, unspecified hyperlipidemia type  -     Lipid Panel With LDL / HDL Ratio    4. Chronic midline low back pain without sciatica  -     XR Spine Lumbar 2 or 3 View (In Office)  -     Ambulatory Referral to Physical Therapy      Patient willing to switch to statin. He does not know why he was on Lopid. Denies issues with statins in the past.     BP in normal range off medications. Will monitor. Patient checking BP frequently at home. Bring log to next appointment.     Stressed the importance today about checking BG at least once daily. Recommend to keep a log and bring to next appointment.     Back pain is very bothersome to patient. Will evaluate  with XR and refer to PT. Advised patient against using ibuprofen  given recent RAUL. Recommend tylenol, lidocaine patches and diclofenac gel.       Health Maintenance Due   Topic Date Due    URINE MICROALBUMIN  Never done    COLORECTAL CANCER SCREENING  Never done    COVID-19 Vaccine (1) Never done    TDAP/TD VACCINES (1 - Tdap) Never done    ZOSTER VACCINE (1 of 2) Never done    HEPATITIS C SCREENING  Never done    ANNUAL WELLNESS VISIT  Never done    DIABETIC FOOT EXAM  Never done    DIABETIC EYE EXAM  Never done    INFLUENZA VACCINE  Never done    AAA SCREEN (ONE-TIME)  Never done        Follow Up  Return in about 4 weeks (around 1/4/2024) for Medicare Wellness.    Patient/family had no further questions at this time and verbalized understanding of the plan discussed today.

## 2023-12-07 ENCOUNTER — OFFICE VISIT (OUTPATIENT)
Dept: INTERNAL MEDICINE | Facility: CLINIC | Age: 73
End: 2023-12-07
Payer: MEDICARE

## 2023-12-07 VITALS
SYSTOLIC BLOOD PRESSURE: 127 MMHG | OXYGEN SATURATION: 97 % | HEIGHT: 69 IN | HEART RATE: 105 BPM | BODY MASS INDEX: 41.62 KG/M2 | WEIGHT: 281 LBS | DIASTOLIC BLOOD PRESSURE: 78 MMHG

## 2023-12-07 DIAGNOSIS — I10 PRIMARY HYPERTENSION: ICD-10-CM

## 2023-12-07 DIAGNOSIS — E11.65 TYPE 2 DIABETES MELLITUS WITH HYPERGLYCEMIA, WITH LONG-TERM CURRENT USE OF INSULIN: Primary | ICD-10-CM

## 2023-12-07 DIAGNOSIS — Z79.4 TYPE 2 DIABETES MELLITUS WITH HYPERGLYCEMIA, WITH LONG-TERM CURRENT USE OF INSULIN: Primary | ICD-10-CM

## 2023-12-07 DIAGNOSIS — M54.50 CHRONIC MIDLINE LOW BACK PAIN WITHOUT SCIATICA: ICD-10-CM

## 2023-12-07 DIAGNOSIS — G89.29 CHRONIC MIDLINE LOW BACK PAIN WITHOUT SCIATICA: ICD-10-CM

## 2023-12-07 DIAGNOSIS — E78.5 HYPERLIPIDEMIA, UNSPECIFIED HYPERLIPIDEMIA TYPE: ICD-10-CM

## 2023-12-07 PROCEDURE — 99214 OFFICE O/P EST MOD 30 MIN: CPT | Performed by: STUDENT IN AN ORGANIZED HEALTH CARE EDUCATION/TRAINING PROGRAM

## 2023-12-07 PROCEDURE — 3074F SYST BP LT 130 MM HG: CPT | Performed by: STUDENT IN AN ORGANIZED HEALTH CARE EDUCATION/TRAINING PROGRAM

## 2023-12-07 PROCEDURE — 3078F DIAST BP <80 MM HG: CPT | Performed by: STUDENT IN AN ORGANIZED HEALTH CARE EDUCATION/TRAINING PROGRAM

## 2023-12-07 PROCEDURE — 3046F HEMOGLOBIN A1C LEVEL >9.0%: CPT | Performed by: STUDENT IN AN ORGANIZED HEALTH CARE EDUCATION/TRAINING PROGRAM

## 2023-12-07 RX ORDER — FLASH GLUCOSE SENSOR
KIT MISCELLANEOUS
Qty: 4 EACH | Refills: 0 | Status: SHIPPED | OUTPATIENT
Start: 2023-12-07

## 2023-12-08 ENCOUNTER — TELEPHONE (OUTPATIENT)
Dept: INTERNAL MEDICINE | Facility: CLINIC | Age: 73
End: 2023-12-08
Payer: MEDICARE

## 2023-12-08 ENCOUNTER — TELEPHONE (OUTPATIENT)
Dept: INTERNAL MEDICINE | Facility: CLINIC | Age: 73
End: 2023-12-08

## 2023-12-08 DIAGNOSIS — Z79.4 TYPE 2 DIABETES MELLITUS WITH HYPERGLYCEMIA, WITH LONG-TERM CURRENT USE OF INSULIN: Primary | ICD-10-CM

## 2023-12-08 DIAGNOSIS — E11.65 TYPE 2 DIABETES MELLITUS WITH HYPERGLYCEMIA, WITH LONG-TERM CURRENT USE OF INSULIN: Primary | ICD-10-CM

## 2023-12-08 RX ORDER — INSULIN LISPRO 100 [IU]/ML
INJECTION, SOLUTION INTRAVENOUS; SUBCUTANEOUS
Qty: 12 ML | Refills: 0 | Status: SHIPPED | OUTPATIENT
Start: 2023-12-08

## 2023-12-08 RX ORDER — FLASH GLUCOSE SCANNING READER
1 EACH MISCELLANEOUS
Qty: 6 EACH | Refills: 0 | Status: SHIPPED | OUTPATIENT
Start: 2023-12-08

## 2023-12-08 NOTE — TELEPHONE ENCOUNTER
Caller: ALEKSANDER MCKEON    Relationship: Emergency Contact    Best call back number: [phone]    What is the best time to reach you: ANY    Who are you requesting to speak with (clinical staff, provider,  specific staff member): CLINICAL    Do you know the name of the person who called: ALEKSANDER    What was the call regarding: GLUCOSE READING 596.  ALEKSANDER TAKING IT NOW TO SEE IF AFTER MEDICATION IT HAS COME DOWN.    GLUCOSE READING AT 12:15  PATIENT TOOK 7 UNITS OF THE HUMULIN R.    PATIENT INDICATED THAT HE ONLY TAKES THE HUMULIN R WHEN HE CHECKS HIS BLOOD SUGAR BUT DOES NOT DO THIS REGULARLY.  PATIENT STATED HE CONSIDERS THIS A SLIDING SCALE AND IS ONLY TO TAKE WHEN NEEDED.    PATIENTS WIFE STATED THAT HE TENDS NOT TO DO WHAT HE IS TOLD BY THE DOCTOR BUT WHAT HE WANTS TO DO.    Is it okay if the provider responds through MyChart: NO

## 2023-12-08 NOTE — TELEPHONE ENCOUNTER
I called Brian Christensen at [phone] at 13:06 EST     I talked to patient's daughters and patient today about hyperglycemia on recent labs.    Initially this morning patient states his blood glucose was 596 and he took 7 units of Humulin R.  He states he did take his 40 units of Lantus this morning.    I called again this afternoon and he states his blood glucose is 506.  He denies polyuria, polydipsia, daughter states he is mentating well.  He denies any nausea or abdominal pain.  Blood pressure is 134/66.  Now patient is stating he actually took 40 units of Humulin R at 11 AM and he just took another 40 units of Humulin R.  He states this is a brand-new bottle and is not .  I am concerned his blood glucose has remained elevated despite taking a substantial amount of Humulin R.  Will switch him to a rapid acting insulin, lispro, with a sliding scale.  I have prescribed him a freestyle destini device which hopefully will improve adherence.  His daughter is checking him frequently over the weekend.  Discussed there is a low threshold for him to go to the emergency department for any new symptoms or changes in mental status.  If his blood glucose is greater than 400 when he checks before that he should go to the emergency department.  He needs to check his blood glucose again tonight before bed or if he is feeling bad he should check his blood glucose.

## 2023-12-08 NOTE — TELEPHONE ENCOUNTER
Caller: [facility] - [place], [place] - [address] - [phone]  - [phone]    Relationship: Pharmacy    Best call back number: [phone]     Requested Prescriptions:   Requested Prescriptions      No prescriptions requested or ordered in this encounter      FREESTYLE DAVONTE READER     Pharmacy where request should be sent: [facility] - [place], [place] - [address] - [phone]  - [phone] FX     Last office visit with prescribing clinician: 12/7/2023   Last telemedicine visit with prescribing clinician: Visit date not found   Next office visit with prescribing clinician: 1/9/2024     Additional details provided by patient: PATIENT IS REQUESTING A READER     Does the patient have less than a 3 day supply:  [] Yes  [] No    Would you like a call back once the refill request has been completed: [] Yes [] No    If the office needs to give you a call back, can they leave a voicemail: [] Yes [] No    Kalpesh Kessler Rep   12/08/23 12:59 EST

## 2023-12-08 NOTE — TELEPHONE ENCOUNTER
I called Brian Christensen at [phone] at 08:26 EST. There was no answer. I called is daughter.    Daughter is concerned there may be some dementia going on. He has uncontrollable anger which is unusual for him. She is concerned he is not checking BG or taking his insulin.     MMSE was actually in a normal range in clinic. Plan to assess for depression at next appointment.     I discussed with his daughter that he needs to be checking his blood glucose at least once a day.  I would like for them to call the office today if they check his blood glucose and it is greater than 300.  Go to the emergency department for symptoms of hypotension such as lightheadedness or dizziness.

## 2023-12-11 ENCOUNTER — TELEPHONE (OUTPATIENT)
Dept: INTERNAL MEDICINE | Facility: CLINIC | Age: 73
End: 2023-12-11
Payer: MEDICARE

## 2023-12-11 NOTE — TELEPHONE ENCOUNTER
Caller: ALEKSANDER MCKEON    Relationship: Emergency Contact    Best call back number: [phone]     What is the best time to reach you: ANY    Who are you requesting to speak with (clinical staff, provider,  specific staff member): CLINICAL    Do you know the name of the person who called: ALEKSANDER    What was the call regarding: ALEKSANDER STATES THAT THE PATIENT'S BLOOD SUGARS ARE DOING BETTER, STILL A LITTLE ELEVATED BUT A LOT BETTER.    Is it okay if the provider responds through MyChart:

## 2023-12-12 RX ORDER — INSULIN LISPRO 100 [IU]/ML
INJECTION, SOLUTION INTRAVENOUS; SUBCUTANEOUS
Qty: 12 ML | Refills: 0 | Status: SHIPPED | OUTPATIENT
Start: 2023-12-12

## 2023-12-12 RX ORDER — INSULIN LISPRO 100 [IU]/ML
INJECTION, SOLUTION INTRAVENOUS; SUBCUTANEOUS
Qty: 12 ML | Refills: 0 | Status: SHIPPED | OUTPATIENT
Start: 2023-12-12 | End: 2023-12-12 | Stop reason: SDUPTHER

## 2023-12-12 NOTE — TELEPHONE ENCOUNTER
They installed Ada over weekend and patient is keeping log on Ada' phone meliton. She will be in town on Thursday and can check them at that time.

## 2023-12-13 RX ORDER — PEN NEEDLE, DIABETIC 30 GX3/16"
1 NEEDLE, DISPOSABLE MISCELLANEOUS 2 TIMES DAILY
Qty: 100 EACH | Refills: 5 | Status: SHIPPED | OUTPATIENT
Start: 2023-12-13

## 2023-12-27 DIAGNOSIS — E11.65 TYPE 2 DIABETES MELLITUS WITH HYPERGLYCEMIA, WITH LONG-TERM CURRENT USE OF INSULIN: ICD-10-CM

## 2023-12-27 DIAGNOSIS — Z79.4 TYPE 2 DIABETES MELLITUS WITH HYPERGLYCEMIA, WITH LONG-TERM CURRENT USE OF INSULIN: ICD-10-CM

## 2023-12-28 RX ORDER — FLASH GLUCOSE SENSOR
KIT MISCELLANEOUS
Qty: 4 EACH | Refills: 5 | Status: SHIPPED | OUTPATIENT
Start: 2023-12-28

## 2023-12-29 ENCOUNTER — OFFICE VISIT (OUTPATIENT)
Dept: ENDOCRINOLOGY | Age: 73
End: 2023-12-29
Payer: MEDICARE

## 2023-12-29 VITALS
BODY MASS INDEX: 42.65 KG/M2 | WEIGHT: 288 LBS | SYSTOLIC BLOOD PRESSURE: 136 MMHG | HEIGHT: 69 IN | HEART RATE: 116 BPM | TEMPERATURE: 96.6 F | DIASTOLIC BLOOD PRESSURE: 68 MMHG | OXYGEN SATURATION: 96 %

## 2023-12-29 DIAGNOSIS — E11.65 TYPE 2 DIABETES MELLITUS WITH HYPERGLYCEMIA, WITH LONG-TERM CURRENT USE OF INSULIN: Primary | ICD-10-CM

## 2023-12-29 DIAGNOSIS — Z79.4 TYPE 2 DIABETES MELLITUS WITH HYPERGLYCEMIA, WITH LONG-TERM CURRENT USE OF INSULIN: Primary | ICD-10-CM

## 2023-12-29 DIAGNOSIS — E78.2 MIXED HYPERLIPIDEMIA: ICD-10-CM

## 2023-12-29 RX ORDER — ROSUVASTATIN CALCIUM 10 MG/1
10 TABLET, COATED ORAL NIGHTLY
Qty: 90 TABLET | Refills: 3 | Status: SHIPPED | OUTPATIENT
Start: 2023-12-29 | End: 2024-03-28

## 2023-12-29 NOTE — ASSESSMENT & PLAN NOTE
LDL and triglyceride are not at goal  We will start rosuvastatin 10 mg daily  Continue gemfibrozil for now.  Discussed with patient that the combination of statin and gemfibrozil might increase the risk of myopathy and rhabdomyolysis.  Instructed to call the office if experiences any muscle pain or notices any side effects patient verbalized understanding.    Nutritional counseling was provided.  Lipids will be reassessed in 3 months.

## 2023-12-29 NOTE — PROGRESS NOTES
"Chief Complaint  Diabetes (Using the destini 14)    Subjective        Brian Christensen presents to [facility] to establish care.      History of Present Illness    Referred for further management of type 2 diabetes    Diagnosed with type 2 diabetes about 40 years ago.  Denies diabetic retinopathy, neuropathy and nephropathy.  His last eye exam was about 2 years ago.   Insulin use:Yes    Status of disease: Uncontrolled   Last A1c  11.7 12/7/2023    Current regimen: Metfromin 1000 mg BID, Actos 30 mg daily, Lantus 40 units daily, Humalog 20 units + sliding scale   If blood glucoses between 150-200 then takes 5 units 200-250 then takes 6 units 250-300 then takes 7 units 300-350 then takes 8 units     Usually takes anywhere from 10 to 50 units of Humalog with meals.  Denies fasting hypoglycemia.  Sometimes gets low blood sugar after meals.  Usually eats 2 meals a day.    Sometimes low after meals     Has a freestyle destini    CGM downloaded and reviewed  Average glucose 279  Glucose variability 32.8%  Very high 70%  High 10%  Target range 20%    Blood sugar tracing is above target range all day.   No BS<70 mg/dL    Denies personal or family history of pancreatitis or pancreatic cancer.  Denies personal or family history of thyroid cancer.    Has mixed hyperlipidemia and takes gemfibrozil 600 mg twice daily.          Objective   Vital Signs:  /68   Pulse 116   Temp 96.6 °F (35.9 °C) (Temporal)   Ht 175.3 cm (69.02\")   Wt 131 kg (288 lb)   SpO2 96%   BMI 42.51 kg/m²   Estimated body mass index is 42.51 kg/m² as calculated from the following:    Height as of this encounter: 175.3 cm (69.02\").    Weight as of this encounter: 131 kg (288 lb).               Review of Systems   Constitutional:  Negative for fatigue and unexpected weight change.   Eyes:  Negative for visual disturbance.   Gastrointestinal:  Negative for abdominal pain, constipation, nausea and vomiting. "   Neurological:  Negative for dizziness.        Physical Exam  Constitutional:       Appearance: He is obese.   HENT:      Head: Normocephalic and atraumatic.   Eyes:      Extraocular Movements: Extraocular movements intact.   Cardiovascular:      Rate and Rhythm: Normal rate and regular rhythm.   Pulmonary:      Effort: Pulmonary effort is normal.      Breath sounds: Normal breath sounds. No wheezing.   Abdominal:      Palpations: Abdomen is soft.      Tenderness: There is no abdominal tenderness.   Musculoskeletal:         General: No swelling. Normal range of motion.      Cervical back: Neck supple. No tenderness.   Neurological:      Mental Status: He is alert and oriented to person, place, and time.   Psychiatric:         Mood and Affect: Mood normal.        Result Review :  The following data was reviewed by: Mahrokh Nokhbehzaeim, MD on 12/29/2023:  Common labs          12/2/2023    06:15 12/3/2023    06:09 12/7/2023    13:53 12/7/2023    13:59   Common Labs   Glucose 426  218  437     BUN 35  23  23     Creatinine 1.16  0.99  1.11     Sodium 131  132  132     Potassium 4.6  4.1  5.5     Chloride 97  100  92     Calcium 9.9  9.3  11.2     Total Protein   8.1     Albumin   5.0     Total Bilirubin   0.7     Alkaline Phosphatase   74     AST (SGOT)   22     ALT (SGPT)   33     WBC 5.94  6.53      Hemoglobin 13.1  13.3      Hematocrit 40.3  40.1      Platelets 311  310      Total Cholesterol   224     Triglycerides   398     HDL Cholesterol   31     LDL Cholesterol    122     Hemoglobin A1C 11.70   11.70     Microalbumin, Urine    18.6      CMP          12/2/2023    06:15 12/3/2023    06:09 12/7/2023    13:53   CMP   Glucose 426  218  437    BUN 35  23  23    Creatinine 1.16  0.99  1.11    EGFR 66.9  80.9     Sodium 131  132  132    Potassium 4.6  4.1  5.5    Chloride 97  100  92    Calcium 9.9  9.3  11.2    Total Protein   8.1    Albumin   5.0    Globulin   3.1    Total Bilirubin   0.7    Alkaline Phosphatase    74    AST (SGOT)   22    ALT (SGPT)   33    BUN/Creatinine Ratio 30.2  23.2  20.7    Anion Gap 10.3  9.2       CBC          12/1/2023    16:50 12/2/2023    06:15 12/3/2023    06:09   CBC   WBC 7.59  5.94  6.53    RBC 5.17  4.59  4.57    Hemoglobin 14.5  13.1  13.3    Hematocrit 46.0  40.3  40.1    MCV 89.0  87.8  87.7    MCH 28.0  28.5  29.1    MCHC 31.5  32.5  33.2    RDW 13.2  13.1  13.2    Platelets 389  311  310      CBC w/diff          12/1/2023    16:50 12/2/2023    06:15 12/3/2023    06:09   CBC w/Diff   WBC 7.59  5.94  6.53    RBC 5.17  4.59  4.57    Hemoglobin 14.5  13.1  13.3    Hematocrit 46.0  40.3  40.1    MCV 89.0  87.8  87.7    MCH 28.0  28.5  29.1    MCHC 31.5  32.5  33.2    RDW 13.2  13.1  13.2    Platelets 389  311  310    Neutrophil Rel % 64.7   53.9    Immature Granulocyte Rel % 0.3   0.3    Lymphocyte Rel % 23.7   33.7    Monocyte Rel % 9.5   9.0    Eosinophil Rel % 1.1   2.5    Basophil Rel % 0.7   0.6      Lipid Panel          8/31/2023    15:09 12/7/2023    13:53   Lipid Panel   Total Cholesterol 241  224    Triglycerides 348  398    HDL Cholesterol 34  31    VLDL Cholesterol 64  71    LDL Cholesterol  143  122        Most Recent A1C          12/7/2023    13:53   HGBA1C Most Recent   Hemoglobin A1C 11.70                   Assessment and Plan   Diagnoses and all orders for this visit:    1. Type 2 diabetes mellitus with hyperglycemia, with long-term current use of insulin (Primary)  Assessment & Plan:  Diabetes is uncontrolled  Reminded to bring in blood sugar diary at next visit.  Dietary recommendations for ADA diet.  Regular aerobic exercise.  Discussed ways to avoid symptomatic hypoglycemia.  Discussed foot care.  Reminded to get yearly retinal exam.  Medication changes per orders.  Ophthalmology referral.  Continue Lantus 40 units daily and adjust the dose of Lantus based on fasting blood sugar.  If fasting blood sugar > 140 on 2 consecutive days can increase the dose of Lantus by 2  units.  Was instructed to take Humalog 20 units with meals, take it 15 minutes before meals.  Was instructed to do low-dose sliding scale:  151-200   +1   201 - 250  +2 units  251-300   + 3 units  300- 350  + 4 units  > 350  +4 units   With dinner will do the correction for blood sugar> 200  Hypoglycemia signs/symptoms and treatment discussed with patient.  Will reassess diabetes in 3 months and might of start GLP-1 agonist at that time..  Check hemoglobin A1c, microalbumin creatinine ratio, and CMP before next visit  Diabetes will be reassessed in 3 months.    Orders:  -     Comprehensive Metabolic Panel; Future  -     Hemoglobin A1c; Future  -     Microalbumin / Creatinine Urine Ratio - Urine, Clean Catch; Future  -     Ambulatory Referral to Ophthalmology    2. Mixed hyperlipidemia  Assessment & Plan:  LDL and triglyceride are not at goal  We will start rosuvastatin 10 mg daily  Continue gemfibrozil for now.  Discussed with patient that the combination of statin and gemfibrozil might increase the risk of myopathy and rhabdomyolysis.  Instructed to call the office if experiences any muscle pain or notices any side effects patient verbalized understanding.    Nutritional counseling was provided.  Lipids will be reassessed in 3 months.    Orders:  -     Lipid Panel; Future  -     rosuvastatin (Crestor) 10 MG tablet; Take 1 tablet by mouth Every Night for 90 days.  Dispense: 90 tablet; Refill: 3           I spent 50 minutes caring for Brian on this date of service. This time includes time spent by me in the following activities:preparing for the visit, reviewing tests, obtaining and/or reviewing a separately obtained history, performing a medically appropriate examination and/or evaluation , counseling and educating the patient/family/caregiver, ordering medications, tests, or procedures, referring and communicating with other health care professionals , documenting information in the medical record, and I spent 5  minutes to review CGM  Follow Up   Return in about 3 months (around 3/29/2024).  Patient was given instructions and counseling regarding his condition or for health maintenance advice. Please see specific information pulled into the AVS if appropriate.

## 2023-12-29 NOTE — ASSESSMENT & PLAN NOTE
Diabetes is uncontrolled  Reminded to bring in blood sugar diary at next visit.  Dietary recommendations for ADA diet.  Regular aerobic exercise.  Discussed ways to avoid symptomatic hypoglycemia.  Discussed foot care.  Reminded to get yearly retinal exam.  Medication changes per orders.  Ophthalmology referral.  Continue Lantus 40 units daily and adjust the dose of Lantus based on fasting blood sugar.  If fasting blood sugar > 140 on 2 consecutive days can increase the dose of Lantus by 2 units.  Was instructed to take Humalog 20 units with meals, take it 15 minutes before meals.  Was instructed to do low-dose sliding scale:  151-200   +1   201 - 250  +2 units  251-300   + 3 units  300- 350  + 4 units  > 350  +4 units   With dinner will do the correction for blood sugar> 200  Hypoglycemia signs/symptoms and treatment discussed with patient.  Will reassess diabetes in 3 months and might of start GLP-1 agonist at that time..  Check hemoglobin A1c, microalbumin creatinine ratio, and CMP before next visit  Diabetes will be reassessed in 3 months.

## 2024-01-09 ENCOUNTER — OFFICE VISIT (OUTPATIENT)
Dept: INTERNAL MEDICINE | Facility: CLINIC | Age: 74
End: 2024-01-09
Payer: MEDICARE

## 2024-01-09 VITALS
HEIGHT: 69 IN | DIASTOLIC BLOOD PRESSURE: 68 MMHG | BODY MASS INDEX: 43.28 KG/M2 | OXYGEN SATURATION: 94 % | WEIGHT: 292.2 LBS | SYSTOLIC BLOOD PRESSURE: 140 MMHG | HEART RATE: 106 BPM

## 2024-01-09 DIAGNOSIS — E11.65 TYPE 2 DIABETES MELLITUS WITH HYPERGLYCEMIA, WITH LONG-TERM CURRENT USE OF INSULIN: ICD-10-CM

## 2024-01-09 DIAGNOSIS — Z00.00 MEDICARE ANNUAL WELLNESS VISIT, SUBSEQUENT: Primary | ICD-10-CM

## 2024-01-09 DIAGNOSIS — Z11.59 ENCOUNTER FOR HEPATITIS C SCREENING TEST FOR LOW RISK PATIENT: ICD-10-CM

## 2024-01-09 DIAGNOSIS — Z79.4 TYPE 2 DIABETES MELLITUS WITH HYPERGLYCEMIA, WITH LONG-TERM CURRENT USE OF INSULIN: ICD-10-CM

## 2024-01-09 DIAGNOSIS — F33.1 MODERATE EPISODE OF RECURRENT MAJOR DEPRESSIVE DISORDER: ICD-10-CM

## 2024-01-09 RX ORDER — INSULIN HUMAN 100 [IU]/ML
INJECTION, SOLUTION PARENTERAL
COMMUNITY
Start: 2023-12-29

## 2024-01-09 RX ORDER — PEN NEEDLE, DIABETIC 31 GX5/16"
NEEDLE, DISPOSABLE MISCELLANEOUS
COMMUNITY
Start: 2023-12-13

## 2024-01-09 RX ORDER — FLUOXETINE 10 MG/1
10 CAPSULE ORAL DAILY
Qty: 90 CAPSULE | Refills: 0 | Status: SHIPPED | OUTPATIENT
Start: 2024-01-09

## 2024-01-09 NOTE — PROGRESS NOTES
"The ABCs of the Annual Wellness Visit  Subsequent Medicare Wellness Visit    Chief Complaint   Patient presents with    Medicare Wellness-subsequent      Subjective    History of Present Illness:  Brian Christensen is a 73 y.o. male who presents for a Subsequent Medicare Wellness Visit.    The following portions of the patient's history were reviewed and   updated as appropriate: allergies, current medications, past family history, past medical history, past social history, past surgical history, and problem list.  He saw his endocrinologist who recommended the following medication changes.  Continue Lantus 40 units daily and adjust the dose of Lantus based on fasting blood sugar.  If fasting blood sugar > 140 on 2 consecutive days can increase the dose of Lantus by 2 units.  Was instructed to take Humalog 20 units with meals, take it 15 minutes before meals.  Was instructed to do low-dose sliding scale:      Now on Actos and Metformin. No symptoms of hyperglycemia. He does not have BG log with him. He continues on 40 U lantus.     He never had x-ray for back pain ordered last visit.    Cough for weeks. Dry cough. No shortness of breath. No congestion. No other symptoms. Red spot on hand. Does not hurt. For months. Not chanding.     Remains off of antihypertensives. Monitos BP closely at home. Usualkly 130s/6.   Incesnse makes him cough. Nothing makes cough better or worse.     Compared to one year ago, the patient feels his physical   health is the same.    Compared to one year ago, the patient feels his mental   health is worse.    Reports depression. States he \"wants to die\". Hospitalized in the past (12 years ago). Would starve himself to commit suicide. Used to take prozac. Prescription ran out. Denies anhedonia. Enjoys being on the computer.     Recent Hospitalizations:  This patient has had a [facility] admission record on file within the last 365 days.    Current Medical Providers:  Patient Care " Team:  Ha Bear MD as PCP - General (Internal Medicine)  Nokhbehzaeim, Mahrokh, MD as Consulting Physician (Endocrinology)    Outpatient Medications Prior to Visit   Medication Sig Dispense Refill    albuterol sulfate  (90 Base) MCG/ACT inhaler Inhale 1 puff Every 4 (Four) Hours As Needed for Wheezing. 6.7 g 3    Blood Glucose Monitoring Suppl (Blood Glucose Monitor System) w/Device kit Use to check finger stick blood sugar _ times per day. 1 each 0    Comfort EZ Pen Needles 31G X 5 MM misc USE 3 TIMES DAILY WITH INSULIN PEN      Continuous Blood Gluc  (FreeStyle Ada 14 Day [place]) device Use 1 Device Every 14 (Fourteen) Days. 6 each 0    Continuous Blood Gluc Sensor (FreeStyle Ada Sensor System) Use Every 10 (Ten) Days. 4 each 5    gemfibrozil (LOPID) 600 MG tablet TAKE 1 (ONE) TABLET BY MOUTH TWICE DAILY 60 tablet 0    Glucose Blood (Blood Glucose Test) strip Use with blood glucose meter to check blood sugar 5 times per day. 100 each 2    HumuLIN R 100 UNIT/ML injection Inject 10 Units under the skin into the appropriate area as directed 3 (Three) Times a Day Before Meals.      Insulin Glargine (LANTUS SOLOSTAR) 100 UNIT/ML injection pen Inject 40 Units under the skin into the appropriate area as directed 2 (Two) Times a Day. 30 mL 12    Insulin Lispro, 1 Unit Dial, (HumaLOG KwikPen) 100 UNIT/ML solution pen-injector Check blood glucose at least 3 times daily prior to meals.  If blood glucoses between 150-200 then take 5 units 200-250 then take 6 units 250-300 then take 7 units 300-350 then take 8 units and sabrina ANN 12 mL 0    Insulin Pen Needle (Pen Needles) 32G X 4 MM misc Use 1 each 2 (Two) Times a Day. 100 each 5    Insulin Syringes, Disposable, U-100 0.5 ML misc Use needle 5 times daily for injection into the skin of the abdomen or upper outside of the thighs.   Do not reuse needles. 100 each 2    Lancets Thin misc Use with lancet device to check blood glucose 5 times per day via finger  "stick. 100 each 3    metFORMIN (GLUCOPHAGE) 1000 MG tablet Take 1 tablet by mouth 2 (Two) Times a Day With Meals. 180 tablet 2    miconazole (MICOTIN) 2 % cream Apply 1 application  topically to the appropriate area as directed 2 (Two) Times a Day. Skin fold      pioglitazone (ACTOS) 30 MG tablet Take 1 tablet by mouth Daily. 90 tablet 2    rosuvastatin (Crestor) 10 MG tablet Take 1 tablet by mouth Every Night for 90 days. 90 tablet 3     No facility-administered medications prior to visit.       No opioid medication identified on active medication list. I have reviewed chart for other potential  high risk medication/s and harmful drug interactions in the elderly.        Aspirin is not on active medication list.  Aspirin use is not indicated based on review of current medical condition/s. Risk of harm outweighs potential benefits.  .    Patient Active Problem List   Diagnosis    Type 2 diabetes mellitus with hyperglycemia, with long-term current use of insulin    Hypertension    Hyperlipidemia    Hyperglycemia    Elevated troponin     Advance Care Planning  Advance Directive is not on file.  ACP discussion was held with the patient during this visit. Patient has an advance directive (not in EMR), copy requested.          Objective    Vitals:    01/09/24 1314   BP: 140/68   Pulse: 106   SpO2: 94%   Weight: 133 kg (292 lb 3.2 oz)   Height: 175.3 cm (69.02\")     Estimated body mass index is 43.13 kg/m² as calculated from the following:    Height as of this encounter: 175.3 cm (69.02\").    Weight as of this encounter: 133 kg (292 lb 3.2 oz).           Does the patient have evidence of cognitive impairment? No    Physical Exam  Constitutional:       Appearance: Normal appearance.   Cardiovascular:      Rate and Rhythm: Normal rate and regular rhythm.      Pulses:           Dorsalis pedis pulses are 2+ on the right side and 1+ on the left side.      Heart sounds: Normal heart sounds. No murmur heard.  Pulmonary:      " Effort: Pulmonary effort is normal.      Breath sounds: Normal breath sounds.   Abdominal:      General: Abdomen is flat. There is no distension.      Palpations: Abdomen is soft.      Tenderness: There is no abdominal tenderness.   Feet:      Right foot:      Protective Sensation: 7 sites tested.        Skin integrity: Callus present.      Left foot:      Protective Sensation: 7 sites tested.  6 sites sensed.      Skin integrity: Callus present.      Toenail Condition: Left toenails are abnormally thick.   Skin:     General: Skin is warm and dry.   Neurological:      Mental Status: He is alert.   Psychiatric:         Mood and Affect: Mood normal.         Behavior: Behavior normal.         Thought Content: Thought content normal.       Lab Results   Component Value Date    CHLPL 224 (H) 2023    TRIG 398 (H) 2023    HDL 31 (L) 2023     (H) 2023    VLDL 71 (H) 2023    HGBA1C 11.70 (H) 2023    HGBA1C 11.70 (H) 2023            HEALTH RISK ASSESSMENT    Smoking Status:  Social History     Tobacco Use   Smoking Status Former    Packs/day: 1.00    Years: 20.00    Additional pack years: 0.00    Total pack years: 20.00    Types: Cigarettes    Quit date:     Years since quittin.0   Smokeless Tobacco Never     Alcohol Consumption:  Social History     Substance and Sexual Activity   Alcohol Use Not Currently     Fall Risk Screen:    STEADI Fall Risk Assessment was completed, and patient is at LOW risk for falls.Assessment completed on:2024    Depression Screenin/9/2024     1:16 PM   PHQ-2/PHQ-9 Depression Screening   Little Interest or Pleasure in Doing Things 0-->not at all   Feeling Down, Depressed or Hopeless 1-->several days   PHQ-9: Brief Depression Severity Measure Score 1       Health Habits and Functional and Cognitive Screenin/9/2024     1:15 PM   Functional & Cognitive Status   Do you have difficulty preparing food and eating? No   Do you  have difficulty bathing yourself, getting dressed or grooming yourself? No   Do you have difficulty using the toilet? No   Do you have difficulty moving around from place to place? Yes   Do you have trouble with steps or getting out of a bed or a chair? Yes   Do you need help using the phone?  No   Are you deaf or do you have serious difficulty hearing?  No   Do you need help to go to places out of walking distance? Yes   Do you need help shopping? Yes   Do you need help preparing meals?  No   Do you need help with housework?  Yes   Do you need help with laundry? Yes   Do you need help taking your medications? No   Do you need help managing money? Yes   Do you ever drive or ride in a car without wearing a seat belt? No       Age-appropriate Screening Schedule:  Refer to the list below for future screening recommendations based on patient's age, sex and/or medical conditions. Orders for these recommended tests are listed in the plan section. The patient has been provided with a written plan.    Health Maintenance   Topic Date Due    COLORECTAL CANCER SCREENING  Never done    COVID-19 Vaccine (1) Never done    TDAP/TD VACCINES (1 - Tdap) Never done    ZOSTER VACCINE (1 of 2) Never done    HEPATITIS C SCREENING  Never done    ANNUAL WELLNESS VISIT  Never done    DIABETIC FOOT EXAM  Never done    DIABETIC EYE EXAM  Never done    INFLUENZA VACCINE  Never done    AAA SCREEN (ONE-TIME)  01/09/2024 (Originally 8/31/2023)    HEMOGLOBIN A1C  06/07/2024    LIPID PANEL  12/07/2024    URINE MICROALBUMIN  12/07/2024    BMI FOLLOWUP  12/07/2024    Pneumococcal Vaccine 65+  Completed              Assessment & Plan   CMS Preventative Services Quick Reference  Risk Factors Identified During Encounter  Chronic Pain: Natural history and expected course discussed. Questions answered.  Declines PT. Encouraged X ray ordered last appointment. I requested prior MRI  Immunizations Discussed/Encouraged: Tdap, Shingrix, and COVID19  Vision  "Screening Recommended -Declines optho referral. \"I see what I see\".  Needs diabetic eye exam. Also has cataracts on the right.  The above risks/problems have been discussed with the patient.  Follow up actions/plans if indicated are seen below in the Assessment/Plan Section.  Pertinent information has been shared with the patient in the After Visit Summary.    Diagnoses and all orders for this visit:    1. Medicare annual wellness visit, subsequent (Primary)    2. Type 2 diabetes mellitus with hyperglycemia, with long-term current use of insulin  -     Comprehensive Metabolic Panel  -     CBC & Differential  -     Lipid Panel  -     Microalbumin / Creatinine Urine Ratio - Urine, Clean Catch    3. Encounter for hepatitis C screening test for low risk patient  -     HCV Antibody Rfx To Qnt PCR    4. Moderate episode of recurrent major depressive disorder  -     FLUoxetine (PROzac) 10 MG capsule; Take 1 capsule by mouth Daily.  Dispense: 90 capsule; Refill: 0    Declines AAA screen.    Last cologuard was within 3 years per patient-requested records.    -Discussed with patient the importance of yearly eye exams to check for retinopathy in patients with diabetes. Patient has not had an exam in the last year. If not, patient declined referral to ophthalmology.   -Discussed importance of yearly minimum diabetic foot exam. Discussed with patient the importance of daily self foot care by visualizing both feet including the soles and between the toes; keep feet dry by regularly changing socks and shoes and drying feet thoroughly after baths and exercise; report any new lesions/discolorations/or swelling; do not walk barefoot, even while indoors; avoid shoes that are too small/tight/or rub against any area of the foot; change shoes every year at a minimum.   -he declines podiatry referral.    Patient reports depression. He has SI with a plan that is not immediate threat (states he will starve himself if he wants to commit " suicide). Apparently he tolerated Prozac in the past. We will start this today.     Follow Up:   Return in about 4 weeks (around 2/6/2024) for Video visit.     An After Visit Summary and PPPS were made available to the patient.

## 2024-01-09 NOTE — PATIENT INSTRUCTIONS
Try over te counter allergy medicines for cough.     Dermatologists    [facility]  [address] [place] [address] [phone]    [facility]  [address]  [place], [place] [phone]    Dr. Chrissy Rodriguez  [address] [place] [address] [phone]    Dr. Eva Reynoso  [address] [address] [phone]

## 2024-01-10 ENCOUNTER — TELEPHONE (OUTPATIENT)
Dept: INTERNAL MEDICINE | Facility: CLINIC | Age: 74
End: 2024-01-10
Payer: MEDICARE

## 2024-01-10 LAB
ALBUMIN SERPL-MCNC: 4.3 G/DL (ref 3.8–4.8)
ALBUMIN/GLOB SERPL: 1.4 {RATIO} (ref 1.2–2.2)
ALP SERPL-CCNC: 66 IU/L (ref 44–121)
ALT SERPL-CCNC: 31 IU/L (ref 0–44)
AST SERPL-CCNC: 28 IU/L (ref 0–40)
BASOPHILS # BLD AUTO: 0.1 X10E3/UL (ref 0–0.2)
BASOPHILS NFR BLD AUTO: 1 %
BILIRUB SERPL-MCNC: 0.5 MG/DL (ref 0–1.2)
BUN SERPL-MCNC: 15 MG/DL (ref 8–27)
BUN/CREAT SERPL: 15 (ref 10–24)
CALCIUM SERPL-MCNC: 9.7 MG/DL (ref 8.6–10.2)
CHLORIDE SERPL-SCNC: 95 MMOL/L (ref 96–106)
CHOLEST SERPL-MCNC: 140 MG/DL (ref 100–199)
CO2 SERPL-SCNC: 22 MMOL/L (ref 20–29)
CREAT SERPL-MCNC: 0.98 MG/DL (ref 0.76–1.27)
EGFRCR SERPLBLD CKD-EPI 2021: 82 ML/MIN/1.73
EOSINOPHIL # BLD AUTO: 0.2 X10E3/UL (ref 0–0.4)
EOSINOPHIL NFR BLD AUTO: 3 %
ERYTHROCYTE [DISTWIDTH] IN BLOOD BY AUTOMATED COUNT: 13 % (ref 11.6–15.4)
GLOBULIN SER CALC-MCNC: 3 G/DL (ref 1.5–4.5)
GLUCOSE SERPL-MCNC: 406 MG/DL (ref 70–99)
HCT VFR BLD AUTO: 43.8 % (ref 37.5–51)
HCV AB SERPL QL IA: NORMAL
HCV IGG SERPL QL IA: NON REACTIVE
HDLC SERPL-MCNC: 31 MG/DL
HGB BLD-MCNC: 14.5 G/DL (ref 13–17.7)
IMM GRANULOCYTES # BLD AUTO: 0.1 X10E3/UL (ref 0–0.1)
IMM GRANULOCYTES NFR BLD AUTO: 1 %
LDLC SERPL CALC-MCNC: 58 MG/DL (ref 0–99)
LYMPHOCYTES # BLD AUTO: 2 X10E3/UL (ref 0.7–3.1)
LYMPHOCYTES NFR BLD AUTO: 23 %
MCH RBC QN AUTO: 29.1 PG (ref 26.6–33)
MCHC RBC AUTO-ENTMCNC: 33.1 G/DL (ref 31.5–35.7)
MCV RBC AUTO: 88 FL (ref 79–97)
MONOCYTES # BLD AUTO: 0.6 X10E3/UL (ref 0.1–0.9)
MONOCYTES NFR BLD AUTO: 7 %
NEUTROPHILS # BLD AUTO: 5.6 X10E3/UL (ref 1.4–7)
NEUTROPHILS NFR BLD AUTO: 65 %
PLATELET # BLD AUTO: 377 X10E3/UL (ref 150–450)
POTASSIUM SERPL-SCNC: 4.7 MMOL/L (ref 3.5–5.2)
PROT SERPL-MCNC: 7.3 G/DL (ref 6–8.5)
RBC # BLD AUTO: 4.99 X10E6/UL (ref 4.14–5.8)
SODIUM SERPL-SCNC: 135 MMOL/L (ref 134–144)
TRIGL SERPL-MCNC: 326 MG/DL (ref 0–149)
VLDLC SERPL CALC-MCNC: 51 MG/DL (ref 5–40)
WBC # BLD AUTO: 8.6 X10E3/UL (ref 3.4–10.8)

## 2024-01-25 ENCOUNTER — TELEMEDICINE (OUTPATIENT)
Dept: INTERNAL MEDICINE | Facility: CLINIC | Age: 74
End: 2024-01-25
Payer: MEDICARE

## 2024-01-25 DIAGNOSIS — E11.65 TYPE 2 DIABETES MELLITUS WITH HYPERGLYCEMIA, WITH LONG-TERM CURRENT USE OF INSULIN: ICD-10-CM

## 2024-01-25 DIAGNOSIS — Z79.4 TYPE 2 DIABETES MELLITUS WITH HYPERGLYCEMIA, WITH LONG-TERM CURRENT USE OF INSULIN: ICD-10-CM

## 2024-01-25 DIAGNOSIS — F33.2 SEVERE EPISODE OF RECURRENT MAJOR DEPRESSIVE DISORDER, WITHOUT PSYCHOTIC FEATURES: Primary | ICD-10-CM

## 2024-01-25 PROCEDURE — 99214 OFFICE O/P EST MOD 30 MIN: CPT | Performed by: STUDENT IN AN ORGANIZED HEALTH CARE EDUCATION/TRAINING PROGRAM

## 2024-01-25 NOTE — PROGRESS NOTES
"  Ha Bear M.D.  Internal Medicine  [facility]  [address], [address]  [place] [address]  [phone]      Chief Complaint  Depression    SUBJECTIVE    History of Present Illness    Brian Christensen is a 73 y.o. male who presents to the office today as an established patient that last saw me on 1/9/2024.     Mode of Visit: Video  Location of patient: home  Location of provider: [facility] clinic  You have chosen to receive care through a telehealth visit.  Does the patient consent to use a video/audio connection their medical care today? yes  The visit included audio and video interaction. No technical issues occurred during this visit.     Taking Prozac. Mood is \"fine\". He denies depression. He states wants to die. He is tired of being alive. He wants to be with his family.  Denies an active plan for suicide. Reports his Sleep is fine. No issues/side effects with prozac.     He is not sure why BG was high last appointment. Yesterday BG was less than 100. Yesterday was 329. He reports BG is greatest in the morning.     Taking 48 U lantus in morning and at night.     Taking lispro 20 U with meal.       Review of Systems    No Known Allergies     Outpatient Medications Marked as Taking for the 1/25/24 encounter (Telemedicine) with Ha Bear MD   Medication Sig Dispense Refill    Insulin Glargine (LANTUS SOLOSTAR) 100 UNIT/ML injection pen Inject 52 Units under the skin into the appropriate area as directed 2 (Two) Times a Day. 30 mL 0        Past Medical History:   Diagnosis Date    Arthritis     Carpal boss, left     Coronary artery disease     Diabetes mellitus     Heartburn     Hyperlipidemia     Hypertension     Leg fracture, right      Past Surgical History:   Procedure Laterality Date    COLONOSCOPY      FRACTURE SURGERY      TONSILLECTOMY       Family History   Problem Relation Age of Onset    Prostate cancer Father     reports that he quit smoking about 41 years ago. His smoking use " included cigarettes. He has a 20.00 pack-year smoking history. He has never used smokeless tobacco. He reports that he does not currently use alcohol. He reports that he does not currently use drugs.    OBJECTIVE    Vital Signs:   There were no vitals taken for this visit.    Physical Exam  Constitutional:       Appearance: Normal appearance.   Skin:     General: Skin is warm and dry.   Neurological:      Mental Status: He is alert.   Psychiatric:         Mood and Affect: Mood normal.         Behavior: Behavior normal.         Thought Content: Thought content normal.          The following data was reviewed by: Ha Bear MD on 01/25/2024:  CMP          12/3/2023    06:09 12/7/2023    13:53 1/9/2024    14:08   CMP   Glucose 218  437  406    BUN 23  23  15    Creatinine 0.99  1.11  0.98    EGFR 80.9      Sodium 132  132  135    Potassium 4.1  5.5  4.7    Chloride 100  92  95    Calcium 9.3  11.2  9.7    Total Protein  8.1  7.3    Albumin  5.0  4.3    Globulin  3.1  3.0    Total Bilirubin  0.7  0.5    Alkaline Phosphatase  74  66    AST (SGOT)  22  28    ALT (SGPT)  33  31    BUN/Creatinine Ratio 23.2  20.7  15    Anion Gap 9.2        CBC w/diff          12/2/2023    06:15 12/3/2023    06:09 1/9/2024    14:08   CBC w/Diff   WBC 5.94  6.53  8.6    RBC 4.59  4.57  4.99    Hemoglobin 13.1  13.3  14.5    Hematocrit 40.3  40.1  43.8    MCV 87.8  87.7  88    MCH 28.5  29.1  29.1    MCHC 32.5  33.2  33.1    RDW 13.1  13.2  13.0    Platelets 311  310  377    Neutrophil Rel %  53.9  65    Immature Granulocyte Rel %  0.3     Lymphocyte Rel %  33.7  23    Monocyte Rel %  9.0  7    Eosinophil Rel %  2.5  3    Basophil Rel %  0.6  1      Lipid Panel          8/31/2023    15:09 12/7/2023    13:53 1/9/2024    14:08   Lipid Panel   Total Cholesterol 241  224  140    Triglycerides 348  398  326    HDL Cholesterol 34  31  31    VLDL Cholesterol 64  71  51    LDL Cholesterol  143  122  58        A1C Last 3 Results          8/31/2023     15:09 12/2/2023    06:15 12/7/2023    13:53   HGBA1C Last 3 Results   Hemoglobin A1C 11.10  11.70  11.70                  ASSESSMENT & PLAN     Diagnoses and all orders for this visit:    1. Severe episode of recurrent major depressive disorder, without psychotic features (Primary)    2. Type 2 diabetes mellitus with hyperglycemia, with long-term current use of insulin  -     Insulin Glargine (LANTUS SOLOSTAR) 100 UNIT/ML injection pen; Inject 52 Units under the skin into the appropriate area as directed 2 (Two) Times a Day.  Dispense: 30 mL; Refill: 0      He denies depression but reports passive SI. Discussed this is likely due depression symptoms. Recommend to increase Prozac which he declined. Recommended psychiatry referral which he declined.     He reports good adherence to insulin regimen but is having hyperglycemia. Discussed increasing to 52 U BID. I encouraged him to reach out here or to endocrinology for continued hyperglycemia.       Health Maintenance Due   Topic Date Due    COLORECTAL CANCER SCREENING  Never done    COVID-19 Vaccine (1) Never done    TDAP/TD VACCINES (1 - Tdap) Never done    ZOSTER VACCINE (1 of 2) Never done    DIABETIC EYE EXAM  Never done    INFLUENZA VACCINE  Never done    AAA SCREEN (ONE-TIME)  Never done        Follow Up  Return in about 5 months (around 6/25/2024) for Recheck.    Patient/family had no further questions at this time and verbalized understanding of the plan discussed today.

## 2024-01-31 DIAGNOSIS — E11.65 TYPE 2 DIABETES MELLITUS WITH HYPERGLYCEMIA, WITH LONG-TERM CURRENT USE OF INSULIN: ICD-10-CM

## 2024-01-31 DIAGNOSIS — Z79.4 TYPE 2 DIABETES MELLITUS WITH HYPERGLYCEMIA, WITH LONG-TERM CURRENT USE OF INSULIN: ICD-10-CM

## 2024-01-31 RX ORDER — INSULIN GLARGINE 100 [IU]/ML
INJECTION, SOLUTION SUBCUTANEOUS
Qty: 30 ML | Refills: 0 | Status: SHIPPED | OUTPATIENT
Start: 2024-01-31

## 2024-01-31 RX ORDER — INSULIN LISPRO 100 [IU]/ML
INJECTION, SOLUTION INTRAVENOUS; SUBCUTANEOUS
Qty: 15 ML | Refills: 0 | Status: SHIPPED | OUTPATIENT
Start: 2024-01-31 | End: 2024-01-31

## 2024-01-31 RX ORDER — INSULIN LISPRO 100 [IU]/ML
INJECTION, SOLUTION INTRAVENOUS; SUBCUTANEOUS
Qty: 15 ML | Refills: 0 | Status: SHIPPED | OUTPATIENT
Start: 2024-01-31

## 2024-01-31 RX ORDER — INSULIN LISPRO 100 [IU]/ML
INJECTION, SOLUTION INTRAVENOUS; SUBCUTANEOUS
Qty: 15 ML | Refills: 0 | Status: SHIPPED | OUTPATIENT
Start: 2024-01-31 | End: 2024-01-31 | Stop reason: SDUPTHER

## 2024-03-05 DIAGNOSIS — E11.65 TYPE 2 DIABETES MELLITUS WITH HYPERGLYCEMIA: ICD-10-CM

## 2024-03-05 RX ORDER — INSULIN HUMAN 100 [IU]/ML
INJECTION, SOLUTION PARENTERAL
Qty: 10 ML | Refills: 2 | Status: SHIPPED | OUTPATIENT
Start: 2024-03-05

## 2024-03-15 DIAGNOSIS — E11.65 TYPE 2 DIABETES MELLITUS WITH HYPERGLYCEMIA: ICD-10-CM

## 2024-03-15 RX ORDER — INSULIN HUMAN 100 [IU]/ML
15 INJECTION, SOLUTION PARENTERAL
Qty: 15 ML | Refills: 2 | Status: SHIPPED | OUTPATIENT
Start: 2024-03-15 | End: 2024-03-18

## 2024-03-15 RX ORDER — INSULIN HUMAN 100 [IU]/ML
INJECTION, SOLUTION PARENTERAL
Qty: 10 ML | Refills: 2 | OUTPATIENT
Start: 2024-03-15

## 2024-03-18 RX ORDER — INSULIN LISPRO 100 [IU]/ML
20 INJECTION, SOLUTION INTRAVENOUS; SUBCUTANEOUS
Qty: 15 ML | Refills: 0 | Status: SHIPPED | OUTPATIENT
Start: 2024-03-18

## 2024-03-18 RX ORDER — INSULIN GLARGINE 100 [IU]/ML
60 INJECTION, SOLUTION SUBCUTANEOUS DAILY
Qty: 15 ML | Refills: 0 | Status: SHIPPED | OUTPATIENT
Start: 2024-03-18

## 2024-03-18 NOTE — TELEPHONE ENCOUNTER
Specialty Pharmacy Patient Management Program  Prescription Refill Request     Needing refill(s) on the following:      Requested Prescriptions     Pending Prescriptions Disp Refills    Insulin Glargine (Lantus SoloStar) 100 UNIT/ML injection pen 15 mL 0     Sig: Inject 60 Units under the skin into the appropriate area as directed Daily. If fasting blood sugar > 140 on 2 consecutive days can increase the dose of Lantus by 2 units.Max 80 units daily.    Insulin Lispro, 1 Unit Dial, (HumaLOG KwikPen) 100 UNIT/ML solution pen-injector 15 mL 0     Sig: Inject 20 Units under the skin into the appropriate area as directed 3 (Three) Times a Day Before Meals. low-dose sliding scale:  151-200   +1  201 - 250  +2 units  251-300   + 3 units  300- 350  + 4 units  > 350  +4 units  With dinner will do the correction for blood sugar> 200   Max 75 units daily    Continuous Blood Gluc Sensor (FreeStyle Ada 2 Sensor) misc 2 each 0     Sig: Use 1 each Every 14 (Fourteen) Days.       Pended for Dr. Nokhbehzaeim to review, and approve if appropriate.     Jessica Ball, PharmD, MM   Clinical Speciality Pharmacist, Endocrinology   3/18/2024  16:05 EDT

## 2024-03-22 ENCOUNTER — LAB (OUTPATIENT)
Facility: HOSPITAL | Age: 74
End: 2024-03-22
Payer: MEDICARE

## 2024-03-22 DIAGNOSIS — Z79.4 TYPE 2 DIABETES MELLITUS WITH HYPERGLYCEMIA, WITH LONG-TERM CURRENT USE OF INSULIN: ICD-10-CM

## 2024-03-22 DIAGNOSIS — E11.65 TYPE 2 DIABETES MELLITUS WITH HYPERGLYCEMIA, WITH LONG-TERM CURRENT USE OF INSULIN: ICD-10-CM

## 2024-03-22 DIAGNOSIS — E78.2 MIXED HYPERLIPIDEMIA: ICD-10-CM

## 2024-03-22 LAB
ALBUMIN SERPL-MCNC: 4 G/DL (ref 3.5–5.2)
ALBUMIN UR-MCNC: 2.5 MG/DL
ALBUMIN/GLOB SERPL: 1.3 G/DL
ALP SERPL-CCNC: 63 U/L (ref 39–117)
ALT SERPL W P-5'-P-CCNC: 24 U/L (ref 1–41)
ANION GAP SERPL CALCULATED.3IONS-SCNC: 9.8 MMOL/L (ref 5–15)
AST SERPL-CCNC: 19 U/L (ref 1–40)
BILIRUB SERPL-MCNC: 0.3 MG/DL (ref 0–1.2)
BUN SERPL-MCNC: 16 MG/DL (ref 8–23)
BUN/CREAT SERPL: 15.2 (ref 7–25)
CALCIUM SPEC-SCNC: 9.9 MG/DL (ref 8.6–10.5)
CHLORIDE SERPL-SCNC: 102 MMOL/L (ref 98–107)
CHOLEST SERPL-MCNC: 128 MG/DL (ref 0–200)
CO2 SERPL-SCNC: 25.2 MMOL/L (ref 22–29)
CREAT SERPL-MCNC: 1.05 MG/DL (ref 0.76–1.27)
CREAT UR-MCNC: 133.8 MG/DL
EGFRCR SERPLBLD CKD-EPI 2021: 75.4 ML/MIN/1.73
GLOBULIN UR ELPH-MCNC: 3.1 GM/DL
GLUCOSE SERPL-MCNC: 92 MG/DL (ref 65–99)
HBA1C MFR BLD: 11.5 % (ref 4.8–5.6)
HDLC SERPL-MCNC: 31 MG/DL (ref 40–60)
LDLC SERPL CALC-MCNC: 45 MG/DL (ref 0–100)
LDLC/HDLC SERPL: 0.9 {RATIO}
MICROALBUMIN/CREAT UR: 18.7 MG/G (ref 0–29)
POTASSIUM SERPL-SCNC: 4 MMOL/L (ref 3.5–5.2)
PROT SERPL-MCNC: 7.1 G/DL (ref 6–8.5)
SODIUM SERPL-SCNC: 137 MMOL/L (ref 136–145)
TRIGL SERPL-MCNC: 346 MG/DL (ref 0–150)
VLDLC SERPL-MCNC: 52 MG/DL (ref 5–40)

## 2024-03-22 PROCEDURE — 83036 HEMOGLOBIN GLYCOSYLATED A1C: CPT | Performed by: STUDENT IN AN ORGANIZED HEALTH CARE EDUCATION/TRAINING PROGRAM

## 2024-03-22 PROCEDURE — 82043 UR ALBUMIN QUANTITATIVE: CPT | Performed by: STUDENT IN AN ORGANIZED HEALTH CARE EDUCATION/TRAINING PROGRAM

## 2024-03-22 PROCEDURE — 80061 LIPID PANEL: CPT | Performed by: STUDENT IN AN ORGANIZED HEALTH CARE EDUCATION/TRAINING PROGRAM

## 2024-03-22 PROCEDURE — 80053 COMPREHEN METABOLIC PANEL: CPT | Performed by: STUDENT IN AN ORGANIZED HEALTH CARE EDUCATION/TRAINING PROGRAM

## 2024-03-22 PROCEDURE — 36415 COLL VENOUS BLD VENIPUNCTURE: CPT

## 2024-03-22 PROCEDURE — 82570 ASSAY OF URINE CREATININE: CPT | Performed by: STUDENT IN AN ORGANIZED HEALTH CARE EDUCATION/TRAINING PROGRAM

## 2024-03-29 ENCOUNTER — TELEPHONE (OUTPATIENT)
Dept: INTERNAL MEDICINE | Facility: CLINIC | Age: 74
End: 2024-03-29
Payer: MEDICARE

## 2024-03-29 ENCOUNTER — SPECIALTY PHARMACY (OUTPATIENT)
Dept: ENDOCRINOLOGY | Age: 74
End: 2024-03-29
Payer: MEDICARE

## 2024-03-29 ENCOUNTER — OFFICE VISIT (OUTPATIENT)
Dept: ENDOCRINOLOGY | Age: 74
End: 2024-03-29
Payer: MEDICARE

## 2024-03-29 VITALS
DIASTOLIC BLOOD PRESSURE: 60 MMHG | WEIGHT: 284.6 LBS | HEART RATE: 94 BPM | HEIGHT: 69 IN | OXYGEN SATURATION: 95 % | SYSTOLIC BLOOD PRESSURE: 134 MMHG | BODY MASS INDEX: 42.15 KG/M2

## 2024-03-29 DIAGNOSIS — E78.2 MIXED HYPERLIPIDEMIA: ICD-10-CM

## 2024-03-29 DIAGNOSIS — E11.65 TYPE 2 DIABETES MELLITUS WITH HYPERGLYCEMIA, WITH LONG-TERM CURRENT USE OF INSULIN: Primary | ICD-10-CM

## 2024-03-29 DIAGNOSIS — Z79.4 TYPE 2 DIABETES MELLITUS WITH HYPERGLYCEMIA, WITH LONG-TERM CURRENT USE OF INSULIN: Primary | ICD-10-CM

## 2024-03-29 RX ORDER — SEMAGLUTIDE 0.68 MG/ML
0.25 INJECTION, SOLUTION SUBCUTANEOUS WEEKLY
Qty: 3 ML | Refills: 1 | Status: SHIPPED | OUTPATIENT
Start: 2024-03-29

## 2024-03-29 NOTE — ASSESSMENT & PLAN NOTE
Diabetes is  uncontrolled .   Medication changes per orders.  Reminded to bring in blood sugar diary at next visit.  Recommended an ADA diet.  Recommended a Mediterranean style of eating  Regular aerobic exercise.  Discussed ways to avoid symptomatic hypoglycemia.  Discussed sick day management.  Discussed foot care.  Reminded to get yearly retinal exam.  Instructed to take Lantus 60 units daily, if fasting blood sugar above 140 on 2 consecutive days can increase the dose by 2 units.  Max daily dose 80 units  Take Humalog 20 units with meals take it 15 minutes before each meals  Continue the current dose of metformin and Actos  GLP-1 agonist discussed with patient, his wife and his daughter  GLP-1 agonists are given by shots under the skin. They are not insulin.   They make your pancreas produce more insulin when needed in response to food and high blood sugar levels, if the pancreas can make more insulin.     They come in pens to be injected under the skin (subcutaneously). One of them is a daily pill (Rebylsus) that has to be taken in the morning on an empty stomach 30 minutes before eating.     These medications include: Trulicity once weekly; Victoza once daily, Byetta twice daily, Bydureon once weekly, or Ozempic once weekly.     They also suppress your appetite and make a person serra faster.     Benefits could include: 1. Weight reduction; 2. Better blood sugar levels; 3. Victoza, Ozempic and Trulicity have added benefits for cardiovascular disease.     Main side effects could include: nausea and vomiting.   Possible associations with these medications that haven't definitively been found to be caused by these medications: medullary thyroid cancer, pancreatitis, and pancreatic cancer.  Will start Ozempic  Ozempic is started at 0.25 mg weekly for 4 weeks, then increased to 0.5 mg weekly. Dose can be further increased to if needed.  Instructed to have the diabetic snack if  bedtime blood sugar is less than  100  Patient has forgetfulness and insulin compliance is not clear  Needs  assistance with his medication  Will touch base with PCP to see if they can arrange for the home health    Diabetes will be reassessed in 3 months

## 2024-03-29 NOTE — PROGRESS NOTES
Chief Complaint  Diabetes (Type 2. Ada is attached.)    Subjective        Brian Christensen presents to [facility]  for follow up       History of Present Illness      Diagnosed with type 2 diabetes about 40 years ago.  Denies diabetic retinopathy, neuropathy and nephropathy.     Insulin use:Yes    Status of disease: Uncontrolled   Last A1c  11.5% March 2024     Current regimen: Metfromin 1000 mg BID, Actos 30 mg daily,   Lantus 40 unts BID instead of 60 units daily Humalog 20 units with meals      Has a freestyle ada     CGM downloaded and reviewed  Average glucose 174  Glucose variability 40.7%  Very high 19 %  High 25 %  Target range 48 %  Low 3%  Very low 5%     Blood sugar tracing is above target range all day.  Had a low blood sugar at the middle of the night, states his blood sugar was 87 when he went to bed     Denies personal or family history of pancreatitis or pancreatic cancer.  Denies personal or family history of thyroid cancer.     Has mixed hyperlipidemia and takes gemfibrozil 600 mg twice daily.           Component      Latest Ref Rng 12/7/2023 1/9/2024 3/22/2024   WBC      3.4 - 10.8 x10E3/uL  8.6     RBC      4.14 - 5.80 x10E6/uL  4.99     Hemoglobin      13.0 - 17.7 g/dL  14.5     Hematocrit      37.5 - 51.0 %  43.8     MCV      79 - 97 fL  88     MCH      26.6 - 33.0 pg  29.1     MCHC      31.5 - 35.7 g/dL  33.1     RDW      11.6 - 15.4 %  13.0     Platelets      150 - 450 x10E3/uL  377     Neutrophil Rel %      Not Estab. %  65     Lymphocyte Rel %      Not Estab. %  23     Monocyte Rel %      Not Estab. %  7     Eosinophil Rel %      Not Estab. %  3     Basophil Rel %      Not Estab. %  1     Neutrophils Absolute      1.4 - 7.0 x10E3/uL  5.6     Lymphocytes Absolute      0.7 - 3.1 x10E3/uL  2.0     Monocytes Absolute      0.1 - 0.9 x10E3/uL  0.6     Eosinophils Absolute      0.0 - 0.4 x10E3/uL  0.2     Basophils Absolute      0.0 - 0.2 x10E3/uL  0.1     Immature  "Granulocyte Rel %      Not Estab. %  1     Immature Grans, Absolute      0.0 - 0.1 x10E3/uL  0.1     Glucose      65 - 99 mg/dL 437 (HH)  406 (H)  92    BUN      8 - 23 mg/dL 23  15  16    Creatinine      0.76 - 1.27 mg/dL 1.11  0.98  1.05    Sodium      136 - 145 mmol/L 132 (L)  135  137    Potassium      3.5 - 5.2 mmol/L 5.5 (H)  4.7  4.0    Chloride      98 - 107 mmol/L 92 (L)  95 (L)  102    CO2      22.0 - 29.0 mmol/L 26.9  22  25.2    Calcium      8.6 - 10.5 mg/dL 11.2 (H)  9.7  9.9    Total Protein      6.0 - 8.5 g/dL 8.1  7.3  7.1    Albumin      3.5 - 5.2 g/dL 5.0  4.3  4.0    ALT (SGPT)      1 - 41 U/L 33  31  24    AST (SGOT)      1 - 40 U/L 22  28  19    Alkaline Phosphatase      39 - 117 U/L 74  66  63    Total Bilirubin      0.0 - 1.2 mg/dL 0.7  0.5  0.3    Globulin      gm/dL   3.1    A/G Ratio      g/dL 1.6  1.4  1.3    BUN/Creatinine Ratio      7.0 - 25.0  20.7  15  15.2    Anion Gap      5.0 - 15.0 mmol/L   9.8    eGFR      >60.0 mL/min/1.73   75.4    EGFR Result      >59 mL/min/1.73 70.6  82     Globulin      1.5 - 4.5 g/dL 3.1  3.0     Total Cholesterol      0 - 200 mg/dL 224 (H)  140  128    Triglycerides      0 - 150 mg/dL 398 (H)  326 (H)  346 (H)    HDL Cholesterol      40 - 60 mg/dL 31 (L)  31 (L)  31 (L)    LDL Cholesterol       0 - 100 mg/dL 122 (H)  58  45    VLDL Cholesterol      5 - 40 mg/dL   52 (H)    LDL/HDL Ratio   0.90    VLDL Cholesterol Brandon      5 - 40 mg/dL 71 (H)  51 (H)     Creatinine, Urine      mg/dL 66.0   133.8    Microalbumin, Urine      mg/dL 18.6   2.5    Microalbumin/Creatinine Ratio      0.0 - 29.0 mg/g 28   18.7    Hemoglobin A1C      4.80 - 5.60 % 11.70 (H)   11.50 (H)       Legend:  (HH) High Critical  (L) Low  (H) High    Objective   Vital Signs:  /60 (BP Location: Left arm, Patient Position: Sitting)   Pulse 94   Ht 175.3 cm (69.02\")   Wt 129 kg (284 lb 9.6 oz)   SpO2 95%   BMI 42.01 kg/m²   Estimated body mass index is 42.01 kg/m² as calculated from " "the following:    Height as of this encounter: 175.3 cm (69.02\").    Weight as of this encounter: 129 kg (284 lb 9.6 oz).               Review of Systems   Constitutional:  Negative for unexpected weight change.   Cardiovascular:  Negative for palpitations.   Gastrointestinal:  Negative for abdominal pain, nausea and vomiting.        Physical Exam  Constitutional:       Appearance: He is obese.   HENT:      Head: Normocephalic and atraumatic.   Eyes:      Extraocular Movements: Extraocular movements intact.   Cardiovascular:      Rate and Rhythm: Normal rate and regular rhythm.   Pulmonary:      Effort: Pulmonary effort is normal.      Breath sounds: Normal breath sounds. No wheezing.   Abdominal:      Palpations: Abdomen is soft.      Tenderness: There is no abdominal tenderness.   Musculoskeletal:         General: No swelling. Normal range of motion.      Cervical back: Neck supple. No tenderness.   Neurological:      Mental Status: He is alert. Mental status is at baseline.   Psychiatric:         Mood and Affect: Mood normal.        Result Review :  The following data was reviewed by: Mahrokh Nokhbehzaeim, MD on 03/29/2024:  CMP          12/7/2023    13:53 1/9/2024    14:08 3/22/2024    11:13   CMP   Glucose 437  406  92    BUN 23  15  16    Creatinine 1.11  0.98  1.05    EGFR   75.4    Sodium 132  135  137    Potassium 5.5  4.7  4.0    Chloride 92  95  102    Calcium 11.2  9.7  9.9    Total Protein 8.1  7.3     Total Protein   7.1    Albumin 5.0  4.3  4.0    Globulin 3.1  3.0     Globulin   3.1    Total Bilirubin 0.7  0.5  0.3    Alkaline Phosphatase 74  66  63    AST (SGOT) 22  28  19    ALT (SGPT) 33  31  24    Albumin/Globulin Ratio   1.3    BUN/Creatinine Ratio 20.7  15  15.2    Anion Gap   9.8      CBC          12/2/2023    06:15 12/3/2023    06:09 1/9/2024    14:08   CBC   WBC 5.94  6.53  8.6    RBC 4.59  4.57  4.99    Hemoglobin 13.1  13.3  14.5    Hematocrit 40.3  40.1  43.8    MCV 87.8  87.7  88    MCH " 28.5  29.1  29.1    MCHC 32.5  33.2  33.1    RDW 13.1  13.2  13.0    Platelets 311  310  377      CBC w/diff          12/2/2023    06:15 12/3/2023    06:09 1/9/2024    14:08   CBC w/Diff   WBC 5.94  6.53  8.6    RBC 4.59  4.57  4.99    Hemoglobin 13.1  13.3  14.5    Hematocrit 40.3  40.1  43.8    MCV 87.8  87.7  88    MCH 28.5  29.1  29.1    MCHC 32.5  33.2  33.1    RDW 13.1  13.2  13.0    Platelets 311  310  377    Neutrophil Rel %  53.9  65    Immature Granulocyte Rel %  0.3     Lymphocyte Rel %  33.7  23    Monocyte Rel %  9.0  7    Eosinophil Rel %  2.5  3    Basophil Rel %  0.6  1      Lipid Panel          12/7/2023    13:53 1/9/2024    14:08 3/22/2024    11:13   Lipid Panel   Total Cholesterol   128    Total Cholesterol 224  140     Triglycerides 398  326  346    HDL Cholesterol 31  31  31    VLDL Cholesterol 71  51  52    LDL Cholesterol  122  58  45    LDL/HDL Ratio   0.90        Electrolytes          12/7/2023    13:53 1/9/2024    14:08 3/22/2024    11:13   Electrolytes   Sodium 132  135  137    Potassium 5.5  4.7  4.0    Chloride 92  95  102    Calcium 11.2  9.7  9.9      Renal Profile          12/7/2023    13:53 1/9/2024    14:08 3/22/2024    11:13   Renal Profile   BUN 23  15  16    Creatinine 1.11  0.98  1.05      Most Recent A1C          3/22/2024    11:13   HGBA1C Most Recent   Hemoglobin A1C 11.50        A1C Last 3 Results          12/2/2023    06:15 12/7/2023    13:53 3/22/2024    11:13   HGBA1C Last 3 Results   Hemoglobin A1C 11.70  11.70  11.50      Microalbumin          12/7/2023    13:59 3/22/2024    11:21   Microalbumin   Microalbumin, Urine 18.6  2.5      Common labs          12/7/2023    13:53 12/7/2023    13:59 1/9/2024    14:08 3/22/2024    11:13 3/22/2024    11:21   Common Labs   Glucose 437   406  92     BUN 23   15  16     Creatinine 1.11   0.98  1.05     Sodium 132   135  137     Potassium 5.5   4.7  4.0     Chloride 92   95  102     Calcium 11.2   9.7  9.9     Total Protein 8.1   7.3       Albumin 5.0   4.3  4.0     Total Bilirubin 0.7   0.5  0.3     Alkaline Phosphatase 74   66  63     AST (SGOT) 22   28  19     ALT (SGPT) 33   31  24     WBC   8.6      Hemoglobin   14.5      Hematocrit   43.8      Platelets   377      Total Cholesterol    128     Total Cholesterol 224   140      Triglycerides 398   326  346     HDL Cholesterol 31   31  31     LDL Cholesterol  122   58  45     Hemoglobin A1C 11.70    11.50     Microalbumin, Urine  18.6    2.5      CMP          12/7/2023    13:53 1/9/2024    14:08 3/22/2024    11:13   CMP   Glucose 437  406  92    BUN 23  15  16    Creatinine 1.11  0.98  1.05    EGFR   75.4    Sodium 132  135  137    Potassium 5.5  4.7  4.0    Chloride 92  95  102    Calcium 11.2  9.7  9.9    Total Protein 8.1  7.3     Total Protein   7.1    Albumin 5.0  4.3  4.0    Globulin 3.1  3.0     Globulin   3.1    Total Bilirubin 0.7  0.5  0.3    Alkaline Phosphatase 74  66  63    AST (SGOT) 22  28  19    ALT (SGPT) 33  31  24    Albumin/Globulin Ratio   1.3    BUN/Creatinine Ratio 20.7  15  15.2    Anion Gap   9.8       CBC          12/2/2023    06:15 12/3/2023    06:09 1/9/2024    14:08   CBC   WBC 5.94  6.53  8.6    RBC 4.59  4.57  4.99    Hemoglobin 13.1  13.3  14.5    Hematocrit 40.3  40.1  43.8    MCV 87.8  87.7  88    MCH 28.5  29.1  29.1    MCHC 32.5  33.2  33.1    RDW 13.1  13.2  13.0    Platelets 311  310  377       Lipid Panel          12/7/2023    13:53 1/9/2024    14:08 3/22/2024    11:13   Lipid Panel   Total Cholesterol   128    Total Cholesterol 224  140     Triglycerides 398  326  346    HDL Cholesterol 31  31  31    VLDL Cholesterol 71  51  52    LDL Cholesterol  122  58  45    LDL/HDL Ratio   0.90       Most Recent A1C          3/22/2024    11:13   HGBA1C Most Recent   Hemoglobin A1C 11.50          A1C Last 3 Results          12/2/2023    06:15 12/7/2023    13:53 3/22/2024    11:13   HGBA1C Last 3 Results   Hemoglobin A1C 11.70  11.70  11.50       Microalbumin           12/7/2023    13:59 3/22/2024    11:21   Microalbumin   Microalbumin, Urine 18.6  2.5                    Assessment and Plan   Diagnoses and all orders for this visit:    1. Type 2 diabetes mellitus with hyperglycemia, with long-term current use of insulin (Primary)  Assessment & Plan:  Diabetes is  uncontrolled .   Medication changes per orders.  Reminded to bring in blood sugar diary at next visit.  Recommended an ADA diet.  Recommended a Mediterranean style of eating  Regular aerobic exercise.  Discussed ways to avoid symptomatic hypoglycemia.  Discussed sick day management.  Discussed foot care.  Reminded to get yearly retinal exam.  Instructed to take Lantus 60 units daily, if fasting blood sugar above 140 on 2 consecutive days can increase the dose by 2 units.  Max daily dose 80 units  Take Humalog 20 units with meals take it 15 minutes before each meals  Continue the current dose of metformin and Actos  GLP-1 agonist discussed with patient, his wife and his daughter  GLP-1 agonists are given by shots under the skin. They are not insulin.   They make your pancreas produce more insulin when needed in response to food and high blood sugar levels, if the pancreas can make more insulin.     They come in pens to be injected under the skin (subcutaneously). One of them is a daily pill (Rebylsus) that has to be taken in the morning on an empty stomach 30 minutes before eating.     These medications include: Trulicity once weekly; Victoza once daily, Byetta twice daily, Bydureon once weekly, or Ozempic once weekly.     They also suppress your appetite and make a person serra faster.     Benefits could include: 1. Weight reduction; 2. Better blood sugar levels; 3. Victoza, Ozempic and Trulicity have added benefits for cardiovascular disease.     Main side effects could include: nausea and vomiting.   Possible associations with these medications that haven't definitively been found to be caused by these medications:  medullary thyroid cancer, pancreatitis, and pancreatic cancer.  Will start Ozempic  Ozempic is started at 0.25 mg weekly for 4 weeks, then increased to 0.5 mg weekly. Dose can be further increased to if needed.  Instructed to have the diabetic snack if  bedtime blood sugar is less than 100  Patient has forgetfulness and insulin compliance is not clear  Needs  assistance with his medication  Will touch base with PCP to see if they can arrange for the home health    Diabetes will be reassessed in 3 months    Orders:  -     Hemoglobin A1c; Future  -     Comprehensive Metabolic Panel; Future  -     Microalbumin / Creatinine Urine Ratio - Urine, Clean Catch; Future  -     Semaglutide,0.25 or 0.5MG/DOS, (Ozempic, 0.25 or 0.5 MG/DOSE,) 2 MG/3ML solution pen-injector; Inject 0.25 mg under the skin into the appropriate area as directed 1 (One) Time Per Week. Start at 0.25 mg weekly for 4 weeks, then increased to 0.5 mg weekly  Dispense: 3 mL; Refill: 1    2. Mixed hyperlipidemia  Assessment & Plan:   Lipid abnormalities are  unchanged    Plan:  Continue same medication/s without change.      Discussed medication dosage, use, side effects, and goals of treatment in detail.    Counseled patient on lifestyle modifications to help control hyperlipidemia.     Patient Treatment Goals:   LDL goal is less than 70    Followup in 3 months.    Orders:  -     Lipid Panel; Future             Follow Up   Return in about 3 months (around 6/29/2024).  Patient was given instructions and counseling regarding his condition or for health maintenance advice. Please see specific information pulled into the AVS if appropriate.

## 2024-03-29 NOTE — TELEPHONE ENCOUNTER
Received a message from Dr Nokhbehzaeim Mr Woodrow's endocrinologist requesting home health to help the patient with his medications. He may need a face to face visit for this and he has not seen Dr Bear in a few months

## 2024-03-29 NOTE — ASSESSMENT & PLAN NOTE
Lipid abnormalities are  unchanged    Plan:  Continue same medication/s without change.      Discussed medication dosage, use, side effects, and goals of treatment in detail.    Counseled patient on lifestyle modifications to help control hyperlipidemia.     Patient Treatment Goals:   LDL goal is less than 70    Followup in 3 months.

## 2024-04-01 DIAGNOSIS — E11.65 TYPE 2 DIABETES MELLITUS WITH HYPERGLYCEMIA, WITH LONG-TERM CURRENT USE OF INSULIN: ICD-10-CM

## 2024-04-01 DIAGNOSIS — F33.1 MODERATE EPISODE OF RECURRENT MAJOR DEPRESSIVE DISORDER: ICD-10-CM

## 2024-04-01 DIAGNOSIS — Z79.4 TYPE 2 DIABETES MELLITUS WITH HYPERGLYCEMIA, WITH LONG-TERM CURRENT USE OF INSULIN: ICD-10-CM

## 2024-04-01 RX ORDER — GLUCOSAMINE HCL/CHONDROITIN SU 500-400 MG
CAPSULE ORAL
Qty: 100 EACH | Refills: 2 | Status: CANCELLED | OUTPATIENT
Start: 2024-04-01

## 2024-04-01 RX ORDER — DIPHENHYDRAMINE HYDROCHLORIDE 25 MG/1
CAPSULE, LIQUID FILLED ORAL
Qty: 1 EACH | Refills: 0 | Status: SHIPPED | OUTPATIENT
Start: 2024-04-01

## 2024-04-01 NOTE — PROGRESS NOTES
Specialty Pharmacy Patient Management Program  Endocrinology Initial Assessment     Brian Christensen was referred by an Endocrinology provider to the Endocrinology Patient Management program offered by [facility] for Type 2 Diabetes on 24.  An initial outreach was conducted, including assessment of therapy appropriateness and specialty medication education for Ozempic. The patient was introduced to services offered by [facility], including: regular assessments, refill coordination, curbside pick-up or mail order delivery options, prior authorization maintenance, and financial assistance programs as applicable. The patient was also provided with contact information for the pharmacy team.     Insurance Coverage & Financial Support  [facility]    Benefits Investigation Summary    Prescription: New Therapy    Dispensing pharmacy: [facility]    Copay amount: Ozempic $94/42 days    Prior Auth and Med Assistance notes: N/A    BIN: [id number]  PCN: [id number]  RX GROUP: [id number]    Relevant Past Medical History and Comorbidities  Relevant medical history and concomitant health conditions were discussed with the patient. The patient's chart has been reviewed for relevant past medical history and comorbid conditions and updated as necessary.  Past Medical History:   Diagnosis Date    Arthritis     Carpal boss, left     Coronary artery disease     Diabetes mellitus     Heartburn     Hyperlipidemia     Hypertension     Leg fracture, right      Social History     Socioeconomic History    Marital status:    Tobacco Use    Smoking status: Former     Current packs/day: 0.00     Average packs/day: 1 pack/day for 20.0 years (20.0 ttl pk-yrs)     Types: Cigarettes     Start date:      Quit date:      Years since quittin.2    Smokeless tobacco: Never   Vaping Use    Vaping status: Never Used   Substance and Sexual Activity    Alcohol use: Not Currently    Drug use: Not  Currently    Sexual activity: Not Currently       Problem list reviewed by Gely Ball RPH on 4/1/2024 at  7:59 AM    Allergies  Known allergies and reactions were discussed with the patient. The patient's chart has been reviewed for  allergy information and updated as necessary.   No Known Allergies    Allergies reviewed by Gely Ball RPH on 4/1/2024 at  7:59 AM    Relevant Laboratory Values  Relevant laboratory values were discussed with the patient. The following specialty medication dose adjustment(s) are recommended: No adjustments needed at this time.   A1C Last 3 Results          12/2/2023    06:15 12/7/2023    13:53 3/22/2024    11:13   HGBA1C Last 3 Results   Hemoglobin A1C 11.70  11.70  11.50      Lab Results   Component Value Date    HGBA1C 11.50 (H) 03/22/2024     Lab Results   Component Value Date    GLUCOSE 92 03/22/2024    CALCIUM 9.9 03/22/2024     03/22/2024    K 4.0 03/22/2024    CO2 25.2 03/22/2024     03/22/2024    BUN 16 03/22/2024    CREATININE 1.05 03/22/2024    BCR 15.2 03/22/2024    ANIONGAP 9.8 03/22/2024     Lab Results   Component Value Date    CHOL 128 03/22/2024    CHLPL 140 01/09/2024    TRIG 346 (H) 03/22/2024    HDL 31 (L) 03/22/2024    LDL 45 03/22/2024     Microalbumin          12/7/2023    13:59 3/22/2024    11:21   Microalbumin   Microalbumin, Urine 18.6  2.5        Current Medication List  This medication list has been reviewed with the patient and evaluated for any interactions or necessary modifications/recommendations, and updated to include all prescription medications, OTC medications, and supplements the patient is currently taking.  This list reflects what is contained in the patient's profile, which has also been marked as reviewed to communicate to other providers it is the most up to date version of the patient's current medication therapy.     Current Outpatient Medications:     albuterol sulfate  (90 Base) MCG/ACT inhaler, Inhale 1 puff  Every 4 (Four) Hours As Needed for Wheezing., Disp: 8.5 g, Rfl: 3    Blood Glucose Monitoring Suppl (Blood Glucose Monitor System) w/Device kit, Use to check finger stick blood sugar _ times per day., Disp: 1 each, Rfl: 0    Blood Glucose Monitoring Suppl (Blood Glucose Monitor System) w/Device kit, Use as directed to check blood sugar, Disp: 1 each, Rfl: 0    Comfort EZ Pen Needles 31G X 5 MM misc, USE 3 TIMES DAILY WITH INSULIN PEN, Disp: , Rfl:     Continuous Blood Gluc  (FreeStyle Ada 14 Day [place]) device, Use 1 Device Every 14 (Fourteen) Days., Disp: 6 each, Rfl: 0    Continuous Blood Gluc  (FreeStyle Ada 14 Day [place]) device, Use as Directed, Disp: 5 each, Rfl: 0    Continuous Blood Gluc Sensor (FreeStyle Ada 14 Day Sensor) misc, use and replace every 2 weeks as directed., Disp: 4 each, Rfl: 4    Continuous Blood Gluc Sensor (FreeStyle Ada 2 Sensor) misc, Use 1 each Every 14 (Fourteen) Days., Disp: 2 each, Rfl: 0    Continuous Blood Gluc Sensor (FreeStyle Ada 2 Sensor) misc, use for 14 days then replace sensor., Disp: 2 each, Rfl: 0    Continuous Blood Gluc Sensor (FreeStyle Ada Sensor System), Use Every 10 (Ten) Days., Disp: 4 each, Rfl: 5    FLUoxetine (PROzac) 10 MG capsule, Take 1 capsule by mouth Daily., Disp: 90 capsule, Rfl: 0    gemfibrozil (LOPID) 600 MG tablet, TAKE 1 (ONE) TABLET BY MOUTH TWICE DAILY, Disp: 60 tablet, Rfl: 0    Glucose Blood (Blood Glucose Test) strip, Use with blood glucose meter to check blood sugar 5 times per day., Disp: 100 each, Rfl: 2    Insulin Glargine (Lantus SoloStar) 100 UNIT/ML injection pen, Inject 60 Units under the skin into the appropriate area as directed Daily. If fasting blood sugar > 140 on 2 consecutive days can increase the dose of Lantus by 2 units.Max 80 units daily., Disp: 15 mL, Rfl: 0    Insulin Glargine (Lantus SoloStar) 100 UNIT/ML injection pen, Inject 60 units under the skin daily. If fasting blood sugar > 140 on 2  consecutive days can increase dose by 2 units. Max 80 units daily., Disp: 15 mL, Rfl: 0    Insulin Lispro, 1 Unit Dial, (HumaLOG KwikPen) 100 UNIT/ML solution pen-injector, Inject 20 Units under the skin into the appropriate area as directed 3 (Three) Times a Day Before Meals. low-dose sliding scale: 151-200   +1 201 - 250  +2 units 251-300   + 3 units 300- 350  + 4 units > 350  +4 units With dinner will do the correction for blood sugar> 200  Max 75 units daily, Disp: 15 mL, Rfl: 0    insulin NPH (HumuLIN N) 100 UNIT/ML injection, Inject 85 Units under the skin 2 (Two) Times a Day Before Meals., Disp: 55 mL, Rfl: 0    Insulin Pen Needle (Pen Needles) 32G X 4 MM misc, Use 1 each 2 (Two) Times a Day., Disp: 100 each, Rfl: 5    Insulin Pen Needle 31G X 5 MM misc, use 3 times daily with insulin pen., Disp: 100 each, Rfl: 4    Insulin Syringes, Disposable, U-100 0.5 ML misc, Use needle 5 times daily for injection into the skin of the abdomen or upper outside of the thighs.  Do not reuse needles., Disp: 100 each, Rfl: 2    Lancets Thin misc, Use with lancet device to check blood glucose 5 times per day via finger stick., Disp: 100 each, Rfl: 1    lisinopril-hydrochlorothiazide (PRINZIDE,ZESTORETIC) 20-25 MG per tablet, Take 1 tablet by mouth Daily., Disp: 90 tablet, Rfl: 1    metFORMIN (GLUCOPHAGE) 1000 MG tablet, Take 1 tablet by mouth 2 (Two) Times a Day With Meals., Disp: 240 tablet, Rfl: 0    miconazole (MICOTIN) 2 % cream, Apply 1 Application topically to the appropriate area as directed 2 (Two) Times a Day. Skin fold, Disp: , Rfl:     pioglitazone (ACTOS) 30 MG tablet, Take 1 tablet by mouth Daily., Disp: 90 tablet, Rfl: 2    rosuvastatin (Crestor) 10 MG tablet, Take 1 tablet by mouth Every Night for 90 days., Disp: 90 tablet, Rfl: 3    rosuvastatin (Crestor) 10 MG tablet, Take 1 tablet by mouth Every Night., Disp: 90 tablet, Rfl: 3    Semaglutide,0.25 or 0.5MG/DOS, (Ozempic, 0.25 or 0.5 MG/DOSE,) 2 MG/3ML solution  pen-injector, Inject 0.25 mg under the skin into the appropriate area as directed 1 (One) Time Per Week. Start at 0.25 mg weekly for 4 weeks, then increased to 0.5 mg weekly, Disp: 3 mL, Rfl: 1    Medicines reviewed by Gely Ball [facility] on 4/1/2024 at  7:59 AM    Drug Interactions  Gemfibrozil and pioglitazone   Monitor for increased pioglitazone toxicities (eg, decreased blood glucose, evidence of edema or hepatotoxicity) when used in combination with strong CYP2C8 inhibitors.  Pioglitazone, Humalog and Lantus  If insulin is combined with pioglitazone, insulin dose reductions should be considered to reduce the risk of hypoglycemia. Monitor patients for fluid retention and signs/symptoms of heart failure with this combination and consider dosage reduction or discontinuation of pioglitazone if signs/symptoms of heart failure occur.  Gemfibrozil and rosuvastatin  When possible, avoid concurrent use of gemfibrozil or consider using micronized fenofibrate in patients receiving rosuvastatin. For patients in whom concomitant use of gemfibrozil cannot be avoided, initiate rosuvastatin at 5 mg/day and limit rosuvastatin doses to 10 mg/day. Monitor closely for signs and symptoms of rhabdomyolysis with concomitant use.  Humalog, Lantus and Ozempic  Consider insulin dose reductions when used in combination with glucagon-like peptide-1 agonists. Monitor patients for hypoglycemia.    Recommended Medications Assessment  Aspirin: Not Taking Currently  Statin: Currently Taking   ACEi/ARB: Currently Taking     Initial Education Provided for Specialty Medication  The patient has been provided with the following education and any applicable administration techniques (i.e. self-injection) have been demonstrated for the therapies indicated. All questions and concerns have been addressed prior to the patient receiving the medication, and the patient has verbalized comprehension of the education and any materials provided. Additional  patient education shall be provided and documented upon request by the patient, provider, or payer.    Ozempic® (semaglutide)  Medication Expectations   Why am I taking this medication? You are taking Ozempic, along with diet and exercise, to lower blood sugar because you have type 2 diabetes. Diabetes is not curable but with proper medication and treatment, we can keep your blood sugar within your personalized target range.    What should I expect while on this medication? You should expect to see your blood sugar, A1c and possibly weight decrease over time.   How does the medication work? Ozempic is a non-insulin injection that works with your body to release insulin in response to your blood sugar rising.  This medication also slows down food from leaving your stomach, making you feel serra for longer.   How long will I be on this medication for? The amount of time you will be on this medication will be determined by your doctor based on blood sugar and A1c control. You will most likely be on this medication or another diabetes medication throughout your lifetime. Do not abruptly stop this medication without talking to your doctor first.    How do I take this medication? Take as directed on your prescription label. Ozempic is injected under the skin (subcutaneously) of your stomach, thigh or upper arm.  Use this medication once weekly, on the same day each week, and it can be given with or without food.    What are some possible side effects? You may notice you don't feel as hungry, especially when you first start using Ozempic.  The most common side effects are nausea, stomach cramping, and constipation. Stop using Ozempic and call your doctor immediately if you have severe pain in your stomach area that will not go away.    What happens if I miss a dose? If you miss a dose, take it as soon as you remember as long as it is within 5 days after your missed dose.  If more than 5 days have passed, skip the missed  dose and resume Ozempic on the regularly scheduled day.     Medication Safety   What are things I should warn my doctor immediately about? Do not use Ozempic if you or a family member have ever had medullary thyroid cancer (MTC) or Multiple Endocrine Neoplasia syndrome type 2 (MEN 2).  Tell your doctor if you have or have had problems with your kidneys or pancreas, or if you are pregnant, planning to become pregnant. Stop using Ozempic and get medical help right away if you have severe pain that will not go away, or if you notice any signs/symptoms of an allergic reaction (rash, hives, difficulty breathing, etc.).    What are things that I should be cautious of? Be cautious of any side effects from this medication. Talk to your doctor if any new ones develop or aren't getting better.    What are some medications that can interact with this one? Taking Ozempic with other medications that also lower your blood sugar such as insulin and glipizide/glimepiride/glyburide may increase the risk of low blood sugar. Your doctor may reduce the dose of these medications when you start Ozempic.  Always tell your doctor or pharmacist immediately if you start taking any new medications, including over-the-counter medications, vitamins, and herbal supplements.       Medication Storage/Handling   How should I handle this medication? Keep this medication out of reach of pets/children and keep the pen capped    How does this medication need to be stored? Store in the refrigerator prior to first use, but do not freeze.  After first use, you may continue to store in the refrigerator or at room temperature for 56 days.  Protect from excessive heat and sunlight.   How should I dispose of this medication? Used Ozempic pens should be thrown away after 56 days, even if medication remains. If your doctor decides to stop this medication, take to your local police station for proper disposal. Some pharmacies also have take-back bins for  medication drop-off.      Resources/Support   How can I remind myself to take this medication? You can download reminder apps to help you manage your refills. You may also set an alarm on your phone to remind you.    Is financial support available?  [employer] can provide co-pay cards if you have commercial insurance or patient assistance if you have Medicare or no insurance.    Which vaccines are recommended for me? Talk to your doctor about these vaccines: Flu, Coronavirus (COVID-19), Pneumococcal (pneumonia), Tdap, Hepatitis B, Zoster (shingles)          Adherence and Self-Administration  Adherence related to the patient's specialty therapy was discussed with the patient. The Adherence segment of this outreach has been reviewed and updated.     Is there a concern with patient's ability to self administer the medication correctly and without issue?: No  Were any potential barriers to adherence identified during the initial assessment or patient education?: No  Are there any concerns regarding the patient's understanding of the importance of medication adherence?: No  Methods for Supporting Patient Adherence and/or Self-Administration: Follow with fills and clinical assessments     Open Medication Therapy Problems  No medication therapy recommendations to display    Goals of Therapy  Goals related to the patient's specialty therapy were discussed with the patient. The Patient Goals segment of this outreach has been reviewed and updated.   Goals Addressed Today        Specialty Pharmacy General Goal      A1c < 7%    Lab Results   Component Value Date    HGBA1C 11.50 (H) 03/22/2024    HGBA1C 11.70 (H) 12/07/2023    HGBA1C 11.70 (H) 12/02/2023    HGBA1C 11.10 (H) 08/31/2023                Reassessment Plan & Follow-Up  1. Medication Therapy Changes:   Instructed to take Lantus 60 units daily, if fasting blood sugar above 140 on 2 consecutive days can increase the dose by 2 units.  Max daily dose 80 units  Take  Humalog 20 units with meals take it 15 minutes before each meals  Will start Ozempic  Ozempic is started at 0.25 mg weekly for 4 weeks, then increased to 0.5 mg weekly. Dose can be further increased to if needed.  2. Related Plans, Therapy Recommendations, or Therapy Problems to Be Addressed: No issues   3. Pharmacist to perform regular assessments no more than (6) months from the previous assessment.  4. Care Coordinator to set up future refill outreaches, coordinate prescription delivery, and escalate clinical questions to pharmacist.  5. Welcome information and patient satisfaction survey to be sent by specialty pharmacy team with patient's initial fill.    Attestation  Therapeutic appropriateness: Appropriate   I attest the patient was actively involved in and has agreed to the above plan of care. If the prescribed therapy is at any point deemed not appropriate based on the current or future assessments, a consultation will be initiated with the patient's specialty care provider to determine the best course of action. The revised plan of therapy will be documented along with any required assessments and/or additional patient education provided.     Jessica Ball, PharmD, MM  Clinical Specialty Pharmacist, Endocrinology  4/1/2024  14:00 EDT

## 2024-04-01 NOTE — TELEPHONE ENCOUNTER
Rx Refill Note  Requested Prescriptions     Pending Prescriptions Disp Refills    Blood Glucose Monitoring Suppl (Blood Glucose Monitor System) w/Device kit 1 each 0     Sig: Use as directed to check blood sugar      Last office visit with prescribing clinician: Visit date not found   Last telemedicine visit with prescribing clinician: Visit date not found   Next office visit with prescribing clinician: Visit date not found                         Would you like a call back once the refill request has been completed: [] Yes [] No    If the office needs to give you a call back, can they leave a voicemail: [] Yes [] No    Lucille Mcghee  04/01/24, 10:43 EDT    Ftsg Text: The defect edges were debeveled with a #15 scalpel blade.  Given the location of the defect, shape of the defect and the proximity to free margins a full thickness skin graft was deemed most appropriate.  Using a sterile surgical marker, the primary defect shape was transferred to the donor site. The area thus outlined was incised deep to adipose tissue with a #15 scalpel blade.  The harvested graft was then trimmed of adipose tissue until only dermis and epidermis was left.  The skin margins of the secondary defect were undermined to an appropriate distance in all directions utilizing iris scissors.  The secondary defect was closed with interrupted buried subcutaneous sutures.  The skin edges were then re-apposed with running  sutures.  The skin graft was then placed in the primary defect and oriented appropriately.

## 2024-04-01 NOTE — TELEPHONE ENCOUNTER
Specialty Pharmacy Patient Management Program  Prescription Refill Request     Patient currently fills medications at  Pharmacy. Needing refill(s) on the following:      Requested Prescriptions     Pending Prescriptions Disp Refills    Blood Glucose Monitoring Suppl (Blood Glucose Monitor System) w/Device kit 1 each 0     Sig: Use as directed to check blood sugar       Pended for Dr. Nokhbehzaeim to review, and approve if appropriate.

## 2024-04-02 ENCOUNTER — SPECIALTY PHARMACY (OUTPATIENT)
Dept: ENDOCRINOLOGY | Age: 74
End: 2024-04-02
Payer: MEDICARE

## 2024-04-02 DIAGNOSIS — Z79.4 TYPE 2 DIABETES MELLITUS WITH HYPERGLYCEMIA, WITH LONG-TERM CURRENT USE OF INSULIN: ICD-10-CM

## 2024-04-02 DIAGNOSIS — E11.65 TYPE 2 DIABETES MELLITUS WITH HYPERGLYCEMIA, WITH LONG-TERM CURRENT USE OF INSULIN: ICD-10-CM

## 2024-04-02 RX ORDER — GLUCOSAMINE HCL/CHONDROITIN SU 500-400 MG
CAPSULE ORAL
Qty: 100 EACH | Refills: 2 | Status: SHIPPED | OUTPATIENT
Start: 2024-04-02

## 2024-04-02 NOTE — TELEPHONE ENCOUNTER
Rx Refill Note  Requested Prescriptions     Pending Prescriptions Disp Refills    Glucose Blood (Blood Glucose Test) strip 100 each 2     Sig: Use with blood glucose meter to check blood sugar 5 times per day.      Last office visit with prescribing clinician: 3/29/2024   Last telemedicine visit with prescribing clinician: 1/25/2024   Next office visit with prescribing clinician: 6/28/2024                         Would you like a call back once the refill request has been completed: [] Yes [] No    If the office needs to give you a call back, can they leave a voicemail: [] Yes [] No    Lucille Mcghee  04/02/24, 08:17 EDT

## 2024-04-02 NOTE — PROGRESS NOTES
Specialty Pharmacy Refill Coordination Note     Brian is a 73 y.o. male contacted today regarding refills of  7 specialty medication(s).    Reviewed and verified with patient:       Specialty medication(s) and dose(s) confirmed: yes    Refill Questions      Flowsheet Row Most Recent Value   Changes to allergies? No   Changes to medications? No   New conditions or infections since last clinic visit No   Unplanned office visit, urgent care, ED, or hospital admission in the last 4 weeks  No   How does patient/caregiver feel medication is working? Good   Financial problems or insurance changes  No   Since the previous refill, were any specialty medication doses or scheduled injections missed or delayed?  No   Does this patient require a clinical escalation to a pharmacist? No            Delivery Questions      Flowsheet Row Most Recent Value   Delivery method Beeline   Delivery address verified with patient/caregiver? Yes   Delivery address Home   Number of medications in delivery 7   Medication(s) being filled and delivered Rosuvastatin Calcium, Lisinopril-Hydrochlorothiazide, Insulin Pen Needle, Glucose Blood, Blood Glucose Monitoring Suppl, Pioglitazone Hcl, Lancets   Doses left of specialty medications 1 week   Copay verified? Yes   Copay amount 0   Copay form of payment No copayment ($0)                   Follow-up: 25 day(s)     Virginia Ontiveros, Pharmacy Technician  Specialty Pharmacy Technician

## 2024-04-08 RX ORDER — FLUOXETINE 10 MG/1
10 CAPSULE ORAL DAILY
Qty: 90 CAPSULE | Refills: 0 | Status: SHIPPED | OUTPATIENT
Start: 2024-04-08

## 2024-04-08 RX ORDER — IBUPROFEN 600 MG/1
600 TABLET ORAL EVERY 8 HOURS
Qty: 90 TABLET | Refills: 0 | Status: SHIPPED | OUTPATIENT
Start: 2024-04-08

## 2024-04-08 RX ORDER — GEMFIBROZIL 600 MG/1
600 TABLET, FILM COATED ORAL 2 TIMES DAILY
Qty: 60 TABLET | Refills: 1 | Status: SHIPPED | OUTPATIENT
Start: 2024-04-08

## 2024-04-16 RX ORDER — INSULIN LISPRO 100 [IU]/ML
20 INJECTION, SOLUTION INTRAVENOUS; SUBCUTANEOUS
Qty: 15 ML | Refills: 0 | Status: CANCELLED | OUTPATIENT
Start: 2024-04-16

## 2024-04-18 RX ORDER — INSULIN LISPRO 100 [IU]/ML
25 INJECTION, SOLUTION INTRAVENOUS; SUBCUTANEOUS
Qty: 15 ML | Refills: 0 | Status: SHIPPED | OUTPATIENT
Start: 2024-04-18

## 2024-04-18 NOTE — TELEPHONE ENCOUNTER
Rx Refill Note  Requested Prescriptions     Pending Prescriptions Disp Refills    Insulin Lispro, 1 Unit Dial, (HumaLOG KwikPen) 100 UNIT/ML solution pen-injector 15 mL 0     Sig: Inject 20 Units under the skin into the appropriate area as directed 3 (Three) Times a Day Before Meals. low-dose sliding scale:  151-200   +1  201 - 250  +2 units  251-300   + 3 units  300- 350  + 4 units  > 350  +4 units  With dinner will do the correction for blood sugar> 200   Max 75 units daily      Last office visit with prescribing clinician: 3/29/2024   Last telemedicine visit with prescribing clinician: Visit date not found   Next office visit with prescribing clinician: 6/28/2024                         Would you like a call back once the refill request has been completed: [] Yes [] No    If the office needs to give you a call back, can they leave a voicemail: [] Yes [] No    Lilia Mcghee MA  04/18/24, 14:10 EDT

## 2024-04-22 ENCOUNTER — SPECIALTY PHARMACY (OUTPATIENT)
Dept: ENDOCRINOLOGY | Age: 74
End: 2024-04-22
Payer: MEDICARE

## 2024-04-22 NOTE — PROGRESS NOTES
Specialty Pharmacy Patient Management Program  Endocrinology Refill Outreach      Brian is a 73 y.o. male contacted today regarding refills of his medication(s).    Specialty medication(s) and dose(s) confirmed: Ada 14 day sensors, metformin, Humalog, ibu, fluoxetine and gemfibrozil    Refill Questions      Flowsheet Row Most Recent Value   Changes to allergies? No   Changes to medications? No   New conditions or infections since last clinic visit No   Unplanned office visit, urgent care, ED, or hospital admission in the last 4 weeks  No   How does patient/caregiver feel medication is working? Good   Financial problems or insurance changes  No   Since the previous refill, were any specialty medication doses or scheduled injections missed or delayed?  No   Does this patient require a clinical escalation to a pharmacist? No          Delivery Questions      Flowsheet Row Most Recent Value   Delivery method Beeline   Delivery address verified with patient/caregiver? Yes   Delivery address Home   Number of medications in delivery 1   Medication(s) being filled and delivered Continuous Blood Gluc Sensor, Metformin Hcl (Biguanides), Gemfibrozil, Ibuprofen (Nonsteroidal Anti-Inflammatory Agents (Nsaids)), Fluoxetine Hcl (Selective Serotonin Reuptake Inhibitors (Ssris)), Insulin Lispro   Doses left of specialty medications 0   Copay verified? Yes   Copay amount $0   Copay form of payment No copayment ($0)            Follow-Up: About a month for future fills    Jessica Ball, PharmD, MM   Clinical Speciality Pharmacist, Endocrinology   4/22/2024  11:33 EDT

## 2024-04-24 ENCOUNTER — TELEPHONE (OUTPATIENT)
Dept: ENDOCRINOLOGY | Age: 74
End: 2024-04-24
Payer: MEDICARE

## 2024-04-26 ENCOUNTER — HOSPITAL ENCOUNTER (OUTPATIENT)
Facility: HOSPITAL | Age: 74
Discharge: HOME | End: 2024-04-26
Payer: MEDICARE

## 2024-04-26 ENCOUNTER — OFFICE VISIT (OUTPATIENT)
Dept: INTERNAL MEDICINE | Facility: CLINIC | Age: 74
End: 2024-04-26
Payer: MEDICARE

## 2024-04-26 ENCOUNTER — HOME HEALTH ADMISSION (OUTPATIENT)
Dept: HOME HEALTH SERVICES | Facility: HOME HEALTHCARE | Age: 74
End: 2024-04-26
Payer: MEDICARE

## 2024-04-26 VITALS
HEART RATE: 100 BPM | WEIGHT: 284 LBS | SYSTOLIC BLOOD PRESSURE: 110 MMHG | DIASTOLIC BLOOD PRESSURE: 68 MMHG | BODY MASS INDEX: 42.06 KG/M2 | OXYGEN SATURATION: 93 % | HEIGHT: 69 IN

## 2024-04-26 DIAGNOSIS — E11.65 TYPE 2 DIABETES MELLITUS WITH HYPERGLYCEMIA, WITH LONG-TERM CURRENT USE OF INSULIN: Primary | ICD-10-CM

## 2024-04-26 DIAGNOSIS — M17.0 OSTEOARTHRITIS OF BOTH KNEES, UNSPECIFIED OSTEOARTHRITIS TYPE: ICD-10-CM

## 2024-04-26 DIAGNOSIS — M25.561 CHRONIC PAIN OF BOTH KNEES: ICD-10-CM

## 2024-04-26 DIAGNOSIS — Z79.4 TYPE 2 DIABETES MELLITUS WITH HYPERGLYCEMIA, WITH LONG-TERM CURRENT USE OF INSULIN: Primary | ICD-10-CM

## 2024-04-26 DIAGNOSIS — G89.29 CHRONIC PAIN OF BOTH KNEES: ICD-10-CM

## 2024-04-26 DIAGNOSIS — M25.562 CHRONIC PAIN OF BOTH KNEES: ICD-10-CM

## 2024-04-26 DIAGNOSIS — G89.29 CHRONIC MIDLINE LOW BACK PAIN WITHOUT SCIATICA: ICD-10-CM

## 2024-04-26 DIAGNOSIS — M54.50 CHRONIC MIDLINE LOW BACK PAIN WITHOUT SCIATICA: ICD-10-CM

## 2024-04-26 DIAGNOSIS — Z91.89 AT RISK FOR MEDICATION NONCOMPLIANCE: ICD-10-CM

## 2024-04-26 PROCEDURE — 3046F HEMOGLOBIN A1C LEVEL >9.0%: CPT | Performed by: STUDENT IN AN ORGANIZED HEALTH CARE EDUCATION/TRAINING PROGRAM

## 2024-04-26 PROCEDURE — 72100 X-RAY EXAM L-S SPINE 2/3 VWS: CPT

## 2024-04-26 PROCEDURE — 3074F SYST BP LT 130 MM HG: CPT | Performed by: STUDENT IN AN ORGANIZED HEALTH CARE EDUCATION/TRAINING PROGRAM

## 2024-04-26 PROCEDURE — 3078F DIAST BP <80 MM HG: CPT | Performed by: STUDENT IN AN ORGANIZED HEALTH CARE EDUCATION/TRAINING PROGRAM

## 2024-04-26 PROCEDURE — 73560 X-RAY EXAM OF KNEE 1 OR 2: CPT

## 2024-04-26 NOTE — PROGRESS NOTES
"  José Neal D.O.  Internal Medicine  [facility]  [address], [address]  [place] [address]  [phone]      Chief Complaint  patient is her to have home health come in help with meds    SUBJECTIVE    History of Present Illness    Brian Christensen is a 73 y.o. male who presents to the office today as an established patient of Dr Ha Bear.   Pt here today with his wife and daughter who help provide history.    Pt here to discuss request for home health. He states he has type 2 diabetes. Endocrinologist recommended he have home health because his daughter and wife do not believe he is taking his medications as prescribed. Patient states he believes he takes his medications on time. Admits he has not taken his medication today because he had \"so much to do\". Main barrier to leave the home is difficulty with ambulation due to chronic pain as described above. He is only able to get to the store to shop for example if his daughter takes him.   They have a  that comes to the home once weekly to clean.     States he has had knee issues for 40 years. This is on both sides. Symptoms are getting worse over time and he doesn't do much walking because of pain. Uses walker in the house. Also uses his wife's scooter. Pain seems to be worse under the knee. Walking makes the pain worse, no pain at all with sitting. No swelling or redness of the knees. With walking his average knee pain score is 5/10. No history of knee surgery. The pain is dull in nature. Pain is under the kneecap only and doesn't radiate. He played football in college and high school.  No other known injuries.     Also has chronic lower back pain at level 7-8/10. Does not radiate down the legs or buttocks. Is focused in the middle of the low back only.     No Known Allergies     Outpatient Medications Marked as Taking for the 4/26/24 encounter (Office Visit) with José Neal, DO   Medication Sig Dispense Refill    " albuterol sulfate  (90 Base) MCG/ACT inhaler Inhale 1 puff Every 4 (Four) Hours As Needed for Wheezing. 8.5 g 3    Blood Glucose Monitoring Suppl (Blood Glucose Monitor System) w/Device kit Use to check finger stick blood sugar _ times per day. 1 each 0    Blood Glucose Monitoring Suppl (Blood Glucose Monitor System) w/Device kit Use as directed to check blood sugar 1 each 0    Comfort EZ Pen Needles 31G X 5 MM misc USE 3 TIMES DAILY WITH INSULIN PEN      Continuous Blood Gluc  (FreeStyle Ada 14 Day Accident) device Use 1 Device Every 14 (Fourteen) Days. 6 each 0    Continuous Blood Gluc  (FreeStyle Ada 14 Day Accident) device Use as Directed 5 each 0    Continuous Blood Gluc Sensor (FreeStyle Ada 14 Day Sensor) misc use and replace every 2 weeks as directed. 4 each 4    Continuous Blood Gluc Sensor (FreeStyle Ada 2 Sensor) misc Use 1 each Every 14 (Fourteen) Days. 2 each 0    Continuous Blood Gluc Sensor (FreeStyle Ada 2 Sensor) misc use for 14 days then replace sensor. 2 each 0    Continuous Blood Gluc Sensor (FreeStyle Ada Sensor System) Use Every 10 (Ten) Days. 4 each 5    FLUoxetine (PROzac) 10 MG capsule Take 1 capsule by mouth Daily. 90 capsule 0    gemfibrozil (LOPID) 600 MG tablet Take 1 tablet by mouth 2 (Two) Times a Day. 60 tablet 1    Glucose Blood (Blood Glucose Test) strip Use with blood glucose meter to check blood sugar 5 times per day. 100 each 2    ibuprofen (ADVIL,MOTRIN) 600 MG tablet Take 1 tablet by mouth Every 8 (Eight) Hours. 90 tablet 0    Insulin Lispro, 1 Unit Dial, (HumaLOG KwikPen) 100 UNIT/ML solution pen-injector Inject 20 Units under the skin 3 Times a Day Before Meals. Sliding scale: 151-200 +1, 201- 250 +2 units, 251- 300 +3 units, 300- 350 +4 units, >350 +4 units. With dinner do correction for blood sugar> 200. Max 75 units daily 15 mL 0    Insulin Pen Needle (Pen Needles) 32G X 4 MM misc Use 1 each 2 (Two) Times a Day. 100 each 5    Insulin Pen  "Needle 31G X 5 MM misc use 3 times daily with insulin pen. 100 each 4    Insulin Syringes, Disposable, U-100 0.5 ML misc Use needle 5 times daily for injection into the skin of the abdomen or upper outside of the thighs.   Do not reuse needles. 100 each 2    Lancets Thin misc Use with lancet device to check blood glucose 5 times per day via finger stick. 100 each 1    lisinopril-hydrochlorothiazide (PRINZIDE,ZESTORETIC) 20-25 MG per tablet Take 1 tablet by mouth Daily. 90 tablet 1    metFORMIN (GLUCOPHAGE) 1000 MG tablet Take 1 tablet by mouth 2 (Two) Times a Day With Meals. 240 tablet 0    miconazole (MICOTIN) 2 % cream Apply 1 Application topically to the appropriate area as directed 2 (Two) Times a Day. Skin fold      pioglitazone (ACTOS) 30 MG tablet Take 1 tablet by mouth Daily. 90 tablet 2    rosuvastatin (Crestor) 10 MG tablet Take 1 tablet by mouth Every Night. 90 tablet 3    Semaglutide,0.25 or 0.5MG/DOS, (Ozempic, 0.25 or 0.5 MG/DOSE,) 2 MG/3ML solution pen-injector Inject 0.25 mg under the skin into the appropriate area as directed 1 (One) Time Per Week. Start at 0.25 mg weekly for 4 weeks, then increased to 0.5 mg weekly 3 mL 1        Past Medical History:   Diagnosis Date    Arthritis     Carpal boss, left     Coronary artery disease     Diabetes mellitus     Heartburn     Hyperlipidemia     Hypertension     Leg fracture, right        OBJECTIVE    Vital Signs:   /68   Pulse 100   Ht 175.3 cm (69.02\")   Wt 129 kg (284 lb)   SpO2 93%   BMI 41.92 kg/m²        Physical Exam  Vitals reviewed.   Constitutional:       General: He is not in acute distress.     Appearance: He is obese. He is not ill-appearing.      Comments: Poor hygiene    Eyes:      General: No scleral icterus.  Pulmonary:      Effort: Pulmonary effort is normal. No respiratory distress.   Musculoskeletal:      Comments: Left knee: no swelling, erythema, visible or palpable deformity . Mild tenderness to palpation along the medial " "joint line. There is no pain with valgus or varus stress.   Right knee: no swelling, erythema, visible or palpable deformity . Significant tenderness to palpation along the medial joint line. There is pain in the lateral aspect of the knee with valgus stress.     Skin:     Coloration: Skin is not jaundiced.   Neurological:      Mental Status: He is alert.   Psychiatric:         Mood and Affect: Mood normal.         Behavior: Behavior normal.         Thought Content: Thought content normal.                             ASSESSMENT & PLAN     Diagnoses and all orders for this visit:    1. Type 2 diabetes mellitus with hyperglycemia, with long-term current use of insulin (Primary)  2. At risk for medication noncompliance  -Pt here to discuss request for home health. He states he has type 2 diabetes. Endocrinologist recommended he have home health because his daughter and wife do not believe he is taking his medications as prescribed. Patient states he believes he takes his medications on time. Admits he has not taken his medication today because he had \"so much to do\". Main barrier to leave the home is difficulty with ambulation due to chronic pain as described above. He is only able to get to the store to shop for example if his daughter takes him.   They have a  that comes to the home once weekly to clean.   -I can refer patient to home health for skilled nursing to see if he can be educated intensively on medication administration with his numerous injectable diabetes medications and also to assess adherence.   -     Ambulatory Referral to Home Health    3. Chronic pain of both knees  4. Chronic midline low back pain without sciatica  -will begin eval with Xray of both knees and lumbar spine  -will refer to home health PT   -follow up with PCP in one month for reassessment and to determine if advanced imaging is necessary  -     Ambulatory Referral to Home Health  -     XR Knee 1 or 2 View Bilateral  -     " XR Spine Lumbar 2 or 3 View (In Office)            Follow Up  Return in about 4 weeks (around 5/24/2024).    Patient/family had no further questions at this time and verbalized understanding of the plan discussed today.

## 2024-05-08 ENCOUNTER — TELEPHONE (OUTPATIENT)
Dept: INTERNAL MEDICINE | Facility: CLINIC | Age: 74
End: 2024-05-08

## 2024-05-08 NOTE — TELEPHONE ENCOUNTER
Please see attached message and advise. Thank you      Patient has an upcoming appointment on 05.24.24

## 2024-05-09 NOTE — TELEPHONE ENCOUNTER
Left message for Katie Arguelles asking for fax number to fax patients current med list to. And for her to let us know when she goes to see patient and how the visits go each time.     OKAY FOR HUB TO SHARE INFORMATION ABOVE AND   ANY OTHER PREVIOUS MESSAGES IN THIS NOTE/CALL.

## 2024-05-10 NOTE — TELEPHONE ENCOUNTER
Name: JORDANEfrain GILMER      Relationship: Other      Best Callback Number: [phone]     HUB PROVIDED THE RELAY MESSAGE FROM THE OFFICE      PATIENT: VOICED UNDERSTANDING AND HAS NO FURTHER QUESTIONS AT THIS TIME    ADDITIONAL INFORMATION:PHONE NUMBER FOR OFFICE -[phone] SHE DOES NOT HAVE THE FAX NUMBER

## 2024-05-14 ENCOUNTER — OFFICE VISIT (OUTPATIENT)
Dept: ORTHOPEDIC SURGERY | Facility: CLINIC | Age: 74
End: 2024-05-14
Payer: MEDICARE

## 2024-05-14 VITALS — HEIGHT: 69 IN | WEIGHT: 284.4 LBS | BODY MASS INDEX: 42.12 KG/M2 | TEMPERATURE: 97.9 F

## 2024-05-14 DIAGNOSIS — M17.11 PRIMARY OSTEOARTHRITIS OF RIGHT KNEE: Primary | ICD-10-CM

## 2024-05-14 RX ORDER — LIDOCAINE HYDROCHLORIDE 10 MG/ML
5 INJECTION, SOLUTION EPIDURAL; INFILTRATION; INTRACAUDAL; PERINEURAL
Status: COMPLETED | OUTPATIENT
Start: 2024-05-14 | End: 2024-05-14

## 2024-05-14 RX ORDER — INSULIN LISPRO 100 [IU]/ML
25 INJECTION, SOLUTION INTRAVENOUS; SUBCUTANEOUS
Qty: 15 ML | Refills: 0 | Status: SHIPPED | OUTPATIENT
Start: 2024-05-14

## 2024-05-14 RX ORDER — METHYLPREDNISOLONE ACETATE 80 MG/ML
80 INJECTION, SUSPENSION INTRA-ARTICULAR; INTRALESIONAL; INTRAMUSCULAR; SOFT TISSUE
Status: COMPLETED | OUTPATIENT
Start: 2024-05-14 | End: 2024-05-14

## 2024-05-14 RX ORDER — MELOXICAM 15 MG/1
15 TABLET ORAL DAILY
Qty: 30 TABLET | Refills: 1 | Status: SHIPPED | OUTPATIENT
Start: 2024-05-14

## 2024-05-14 RX ADMIN — LIDOCAINE HYDROCHLORIDE 5 ML: 10 INJECTION, SOLUTION EPIDURAL; INFILTRATION; INTRACAUDAL; PERINEURAL at 19:18

## 2024-05-14 RX ADMIN — METHYLPREDNISOLONE ACETATE 80 MG: 80 INJECTION, SUSPENSION INTRA-ARTICULAR; INTRALESIONAL; INTRAMUSCULAR; SOFT TISSUE at 19:18

## 2024-05-14 NOTE — TELEPHONE ENCOUNTER
Rx Refill Note  Requested Prescriptions     Pending Prescriptions Disp Refills    Insulin Lispro, 1 Unit Dial, (HumaLOG KwikPen) 100 UNIT/ML solution pen-injector 15 mL 0     Sig: Inject 20 Units under the skin 3 Times a Day Before Meals. Sliding scale: 151-200 +1, 201- 250 +2 units, 251- 300 +3 units, 300- 350 +4 units, >350 +4 units. With dinner do correction for blood sugar> 200. Max 75 units daily      Last office visit with prescribing clinician: 3/29/2024   Last telemedicine visit with prescribing clinician: Visit date not found   Next office visit with prescribing clinician: 6/28/2024                         Would you like a call back once the refill request has been completed: [] Yes [] No    If the office needs to give you a call back, can they leave a voicemail: [] Yes [] No    Lilia Mcghee MA  05/14/24, 11:51 EDT

## 2024-05-14 NOTE — PROGRESS NOTES
Patient: Brian Christensen  YOB: 1950 73 y.o. male  Medical Record Number: [id number]    Chief Complaints:   Chief Complaint   Patient presents with   • Left Knee - Establish Care, Pain, Initial Evaluation   • Right Knee - Establish Care, Pain, Initial Evaluation       History of Present Illness:Brian Christensen is a 73 y.o. male who presents as a new patient to our practice with complaints of having bilateral knee pain, right significantly greater than left, that is chronic in nature.  Patient reports that the right knee is the focal point of the visit today.  He states that he been hurting off and on for the last 40+ years.  States that he had a football injury when he was a teenager and his knee has not been the same ever since.  Denies having any previous surgeries to the knee however.  Reports the pain is globally anteriorly as well as just behind the kneecap.  States that he has a constant dull ache that with certain physical activities and movements causes a sharp stabbing pain.  States that this is pretty intense in nature at times.  Denies any catching or locking symptoms.  Does report that it occasionally wants to buckle and give way.  Patient is using a rollator walker for safe ambulatory purposes at this time.  States that previously he has had naproxen which gives him some relief.  Other than that he has had no other treatment for the knee.  He was recently seen by his PCP and had x-rays and was referred to orthopedics for further evaluation.    Allergies: No Known Allergies    Medications:   Current Outpatient Medications   Medication Sig Dispense Refill   • albuterol sulfate  (90 Base) MCG/ACT inhaler Inhale 1 puff Every 4 (Four) Hours As Needed for Wheezing. 8.5 g 3   • Blood Glucose Monitoring Suppl (Blood Glucose Monitor System) w/Device kit Use to check finger stick blood sugar _ times per day. 1 each 0   • Blood Glucose Monitoring Suppl (Blood Glucose Monitor System) w/Device kit Use  as directed to check blood sugar 1 each 0   • Comfort EZ Pen Needles 31G X 5 MM misc USE 3 TIMES DAILY WITH INSULIN PEN     • Continuous Blood Gluc  (FreeStyle Ada 14 Day [place]) device Use 1 Device Every 14 (Fourteen) Days. 6 each 0   • Continuous Blood Gluc  (FreeStyle Ada 14 Day [place]) device Use as Directed 5 each 0   • Continuous Blood Gluc Sensor (FreeStyle Ada 2 Sensor) misc Use 1 each Every 14 (Fourteen) Days. 2 each 0   • Continuous Blood Gluc Sensor (FreeStyle Ada 2 Sensor) misc use for 14 days then replace sensor. 2 each 0   • Continuous Blood Gluc Sensor (FreeStyle Ada Sensor System) Use Every 10 (Ten) Days. 4 each 5   • Continuous Glucose Sensor (FreeStyle Ada 14 Day Sensor) misc use and replace every 2 weeks as directed. 4 each 4   • FLUoxetine (PROzac) 10 MG capsule Take 1 capsule by mouth Daily. 90 capsule 0   • gemfibrozil (LOPID) 600 MG tablet Take 1 tablet by mouth 2 (Two) Times a Day. 60 tablet 1   • Glucose Blood (Blood Glucose Test) strip Use with blood glucose meter to check blood sugar 5 times per day. 100 each 2   • Insulin Pen Needle (Pen Needles) 32G X 4 MM misc Use 1 each 2 (Two) Times a Day. 100 each 5   • Insulin Pen Needle 31G X 5 MM misc use 3 times daily with insulin pen. 100 each 4   • Insulin Syringes, Disposable, U-100 0.5 ML misc Use needle 5 times daily for injection into the skin of the abdomen or upper outside of the thighs.   Do not reuse needles. 100 each 2   • Lancets Thin misc Use with lancet device to check blood glucose 5 times per day via finger stick. 100 each 1   • lisinopril-hydrochlorothiazide (PRINZIDE,ZESTORETIC) 20-25 MG per tablet Take 1 tablet by mouth Daily. 90 tablet 1   • metFORMIN (GLUCOPHAGE) 1000 MG tablet Take 1 tablet by mouth 2 (Two) Times a Day With Meals. 240 tablet 0   • miconazole (MICOTIN) 2 % cream Apply 1 Application topically to the appropriate area as directed 2 (Two) Times a Day. Skin fold     • pioglitazone  "(ACTOS) 30 MG tablet Take 1 tablet by mouth Daily. 90 tablet 2   • rosuvastatin (Crestor) 10 MG tablet Take 1 tablet by mouth Every Night. 90 tablet 3   • Semaglutide,0.25 or 0.5MG/DOS, (Ozempic, 0.25 or 0.5 MG/DOSE,) 2 MG/3ML solution pen-injector Inject 0.25 mg under the skin into the appropriate area as directed 1 (One) Time Per Week. Start at 0.25 mg weekly for 4 weeks, then increased to 0.5 mg weekly 3 mL 1   • Insulin Lispro, 1 Unit Dial, (HumaLOG KwikPen) 100 UNIT/ML solution pen-injector Inject 20 Units under the skin 3 Times a Day Before Meals. Sliding scale: 151-200 +1, 201- 250 +2 units, 251- 300 +3 units, 300- 350 +4 units, >350 +4 units. With dinner do correction for blood sugar> 200. Max 75 units daily 15 mL 0   • meloxicam (Mobic) 15 MG tablet Take 1 tablet by mouth Daily. 30 tablet 1   • rosuvastatin (Crestor) 10 MG tablet Take 1 tablet by mouth Every Night for 90 days. 90 tablet 3     No current facility-administered medications for this visit.         The following portions of the patient's history were reviewed and updated as appropriate: allergies, current medications, past family history, past medical history, past social history, past surgical history and problem list.    Review of Systems:   Pertinent positives/negatives listed in HPI above    Physical Exam:   Vitals:    05/14/24 1055   Temp: 97.9 °F (36.6 °C)   TempSrc: Temporal   Weight: 129 kg (284 lb 6.4 oz)   Height: 175.3 cm (69.02\")   PainSc:   8   PainLoc: Knee       General: A and O x 3, ASA, NAD      Knee Exam List: Knee:  right    ALIGNMENT:     Varus  ,   Patella  tracks  midline    GAIT:    Antalgic    SKIN:    No abnormality    RANGE OF MOTION:   5  -  120   DEG    STRENGTH:   4  / 5    LIGAMENTS:    No varus / valgus instability.   Negative  Lachman.    MENISCUS: Positive medial   Chucky and Apley's grind    DISTAL PULSES:    Paplable    DISTAL SENSATION :   Intact    LYMPHATICS:     No   lymphadenopathy    OTHER:          - " Positive   effusion      - Crepitance with ROM        Radiology:  Xrays 3views bilateral knees (ap,lateral, sunrise) recently taken at outside institution were reviewed demonstrating advanced varus osteoarthritis with bone on bone articulation, subchondral cysts, and periarticular osteophytes.  New x-rays were taken today for comparison purposes.    Assessment/Plan: Primary osteoarthritis right knee    Treatment options as well as imaging results were discussed in detail with the patient.  At this point in time would like to proceed for with conservative measures.  I have instructed the patient on the RICE method to help with inflammation and swelling.  I am also going to give him a prescription for meloxicam and discontinue the naproxen as the Mobic is a 24-hour pill.  Will also give him a prescription for a web OA arthritic brace to help with his instability/buckling concerns.  Offered to give the patient a prescription for physical therapy for range of motion and strengthening exercises however he declined this at this time.  Will also give him an intra-articular joint injection to alleviate some of the inflammation and swelling in his knee.  He can follow back up with me on an as-needed basis.    Large Joint Arthrocentesis: R knee  Date/Time: 5/14/2024 7:18 PM  Consent given by: patient  Site marked: site marked  Timeout: Immediately prior to procedure a time out was called to verify the correct patient, procedure, equipment, support staff and site/side marked as required   Supporting Documentation  Indications: pain and joint swelling   Procedure Details  Location: knee - R knee  Preparation: Patient was prepped and draped in the usual sterile fashion  Needle size: 22 G  Approach: anterolateral  Medications administered: 80 mg methylPREDNISolone acetate 80 MG/ML; 5 mL lidocaine PF 1% 1 %  Patient tolerance: patient tolerated the procedure well with no immediate complications        Arsh Ivan  APRN  5/14/2024

## 2024-05-15 ENCOUNTER — SPECIALTY PHARMACY (OUTPATIENT)
Dept: ENDOCRINOLOGY | Age: 74
End: 2024-05-15
Payer: MEDICARE

## 2024-05-15 NOTE — PROGRESS NOTES
Specialty Pharmacy Patient Management Program  Endocrinology Refill Outreach      Brian is a 73 y.o. male contacted today regarding refills of his medication(s).    Specialty medication(s) and dose(s) confirmed: Ada 14 day sensor, metformin and fluoxetine    Refill Questions      Flowsheet Row Most Recent Value   Changes to allergies? No   Changes to medications? No   New conditions or infections since last clinic visit No   Unplanned office visit, urgent care, ED, or hospital admission in the last 4 weeks  No   How does patient/caregiver feel medication is working? Good   Financial problems or insurance changes  No   Since the previous refill, were any specialty medication doses or scheduled injections missed or delayed?  No   Does this patient require a clinical escalation to a pharmacist? No          Delivery Questions      Flowsheet Row Most Recent Value   Delivery method Beeline   Delivery address verified with patient/caregiver? Yes   Delivery address Home   Number of medications in delivery 3   Medication(s) being filled and delivered Metformin Hcl (Biguanides), Fluoxetine Hcl (Selective Serotonin Reuptake Inhibitors (Ssris)), Continuous Glucose Sensor   Doses left of specialty medications About a week   Copay verified? Yes   Copay amount $0   Copay form of payment No copayment ($0)            Follow-Up: About a month for future fills    Jessica Ball, PharmD, MM   Clinical Speciality Pharmacist, Endocrinology   5/15/2024  11:18 EDT

## 2024-05-22 ENCOUNTER — SPECIALTY PHARMACY (OUTPATIENT)
Dept: ENDOCRINOLOGY | Age: 74
End: 2024-05-22
Payer: MEDICARE

## 2024-05-22 NOTE — PROGRESS NOTES
Specialty Pharmacy Refill Coordination Note     Brian is a 73 y.o. male contacted today regarding refills of  3 specialty medication(s).    Reviewed and verified with patient:       Specialty medication(s) and dose(s) confirmed: yes    Refill Questions      Flowsheet Row Most Recent Value   Changes to allergies? No   Changes to medications? No   New conditions or infections since last clinic visit No   Unplanned office visit, urgent care, ED, or hospital admission in the last 4 weeks  No   How does patient/caregiver feel medication is working? Good   Financial problems or insurance changes  No   Since the previous refill, were any specialty medication doses or scheduled injections missed or delayed?  No   Does this patient require a clinical escalation to a pharmacist? No            Delivery Questions      Flowsheet Row Most Recent Value   Delivery method Beeline   Delivery address verified with patient/caregiver? Yes   Delivery address Home   Number of medications in delivery 3   Medication(s) being filled and delivered Gemfibrozil, Insulin Lispro, Insulin Pen Needle   Copay verified? Yes   Copay amount 35   Copay form of payment Credit/debit on file                   Follow-up: 25 day(s)     Virginia Ontiveros, Pharmacy Technician  Specialty Pharmacy Technician

## 2024-05-28 ENCOUNTER — SPECIALTY PHARMACY (OUTPATIENT)
Dept: ENDOCRINOLOGY | Age: 74
End: 2024-05-28
Payer: MEDICARE

## 2024-05-28 DIAGNOSIS — E11.65 TYPE 2 DIABETES MELLITUS WITH HYPERGLYCEMIA, WITH LONG-TERM CURRENT USE OF INSULIN: Primary | ICD-10-CM

## 2024-05-28 DIAGNOSIS — Z79.4 TYPE 2 DIABETES MELLITUS WITH HYPERGLYCEMIA, WITH LONG-TERM CURRENT USE OF INSULIN: Primary | ICD-10-CM

## 2024-05-28 RX ORDER — INSULIN GLARGINE 100 [IU]/ML
INJECTION, SOLUTION SUBCUTANEOUS
Qty: 15 ML | Refills: 0 | Status: CANCELLED | OUTPATIENT
Start: 2024-05-28

## 2024-05-28 NOTE — TELEPHONE ENCOUNTER
Rx Refill Note  Requested Prescriptions     Pending Prescriptions Disp Refills    Insulin Glargine (Lantus SoloStar) 100 UNIT/ML injection pen 15 mL 0     Sig: Inject 60 units under the skin daily. If fasting blood sugar > 140 on 2 consecutive days can increase dose by 2 units. Max 80 units daily.      Last office visit with prescribing clinician: 3/29/2024   Last telemedicine visit with prescribing clinician: Visit date not found   Next office visit with prescribing clinician: 6/28/2024                         Would you like a call back once the refill request has been completed: [] Yes [] No    If the office needs to give you a call back, can they leave a voicemail: [] Yes [] No    Ralph Beckwith MA  05/28/24, 09:01 EDT

## 2024-05-28 NOTE — PROGRESS NOTES
Specialty Pharmacy Refill Coordination Note     Brian is a 73 y.o. male contacted today regarding refills of  1 specialty medication(s).    Reviewed and verified with patient:       Specialty medication(s) and dose(s) confirmed: yes    Refill Questions      Flowsheet Row Most Recent Value   Changes to allergies? No   Changes to medications? No   New conditions or infections since last clinic visit No   Unplanned office visit, urgent care, ED, or hospital admission in the last 4 weeks  No   How does patient/caregiver feel medication is working? Good   Financial problems or insurance changes  No   Since the previous refill, were any specialty medication doses or scheduled injections missed or delayed?  No   Does this patient require a clinical escalation to a pharmacist? No            Delivery Questions      Flowsheet Row Most Recent Value   Delivery method Beeline   Delivery address verified with patient/caregiver? Yes   Delivery address Home   Number of medications in delivery 1   Medication(s) being filled and delivered Insulin Glargine   Doses left of specialty medications 1 week   Copay verified? Yes   Copay amount 35   Copay form of payment Credit/debit on file                   Follow-up: 25 day(s)     Virginia Ontiveros, Pharmacy Technician  Specialty Pharmacy Technician

## 2024-06-01 ENCOUNTER — HOSPITAL ENCOUNTER (OUTPATIENT)
Facility: HOSPITAL | Age: 74
Discharge: HOME | End: 2024-06-01
Payer: MEDICARE

## 2024-06-01 DIAGNOSIS — M51.36 DDD (DEGENERATIVE DISC DISEASE), LUMBAR: ICD-10-CM

## 2024-06-01 PROCEDURE — 72148 MRI LUMBAR SPINE W/O DYE: CPT

## 2024-06-07 ENCOUNTER — SPECIALTY PHARMACY (OUTPATIENT)
Dept: ENDOCRINOLOGY | Age: 74
End: 2024-06-07
Payer: MEDICARE

## 2024-06-07 RX ORDER — INSULIN LISPRO 100 [IU]/ML
25 INJECTION, SOLUTION INTRAVENOUS; SUBCUTANEOUS
Qty: 15 ML | Refills: 0 | Status: SHIPPED | OUTPATIENT
Start: 2024-06-07

## 2024-06-07 NOTE — PROGRESS NOTES
Specialty Pharmacy Refill Coordination Note     Brian is a 73 y.o. male contacted today regarding refills of  3 specialty medication(s).    Reviewed and verified with patient:       Specialty medication(s) and dose(s) confirmed: yes, no    Refill Questions      Flowsheet Row Most Recent Value   Changes to allergies? No   Changes to medications? No   New conditions or infections since last clinic visit No   Unplanned office visit, urgent care, ED, or hospital admission in the last 4 weeks  No   How does patient/caregiver feel medication is working? Good   Financial problems or insurance changes  No   Since the previous refill, were any specialty medication doses or scheduled injections missed or delayed?  No   Does this patient require a clinical escalation to a pharmacist? No            Delivery Questions      Flowsheet Row Most Recent Value   Delivery method Beeline   Delivery address verified with patient/caregiver? Yes   Number of medications in delivery 3   Medication(s) being filled and delivered Continuous Glucose Sensor, Fluoxetine Hcl (Selective Serotonin Reuptake Inhibitors (Ssris)), Metformin Hcl (Biguanides)   Doses left of specialty medications 1 week   Copay verified? Yes   Copay amount 0   Copay form of payment No copayment ($0)                   Follow-up: 25 day(s)     Virginia Ontiveros, Pharmacy Technician  Specialty Pharmacy Technician

## 2024-06-07 NOTE — TELEPHONE ENCOUNTER
Rx Refill Note  Requested Prescriptions     Pending Prescriptions Disp Refills    Insulin Lispro, 1 Unit Dial, (HumaLOG KwikPen) 100 UNIT/ML solution pen-injector 15 mL 0     Sig: Inject 20 Units under the skin 3 Times a Day Before Meals. Sliding scale: 151-200 +1, 201- 250 +2 units, 251- 300 +3 units, 300- 350 +4 units, >350 +4 units. With dinner do correction for blood sugar> 200. Max 75 units daily      Last office visit with prescribing clinician: 3/29/2024   Last telemedicine visit with prescribing clinician: Visit date not found   Next office visit with prescribing clinician: 6/28/2024                         Would you like a call back once the refill request has been completed: [] Yes [] No    If the office needs to give you a call back, can they leave a voicemail: [] Yes [] No    Lucille Mcghee  06/07/24, 08:44 EDT

## 2024-06-11 RX ORDER — GEMFIBROZIL 600 MG/1
600 TABLET, FILM COATED ORAL 2 TIMES DAILY
Qty: 60 TABLET | Refills: 1 | Status: CANCELLED | OUTPATIENT
Start: 2024-06-11

## 2024-06-13 ENCOUNTER — TELEPHONE (OUTPATIENT)
Dept: INTERNAL MEDICINE | Facility: CLINIC | Age: 74
End: 2024-06-13
Payer: MEDICARE

## 2024-06-13 DIAGNOSIS — M51.36 DDD (DEGENERATIVE DISC DISEASE), LUMBAR: Primary | ICD-10-CM

## 2024-06-13 NOTE — TELEPHONE ENCOUNTER
Regarding: Please help!  Contact: [phone]  ----- Message from Deuce Jensen MA sent at 6/13/2024  5:08 PM EDT -----       ----- Message from Brian Christensen to Ha Bear MD sent at 6/13/2024  3:50 PM -----   Brian is in a major amount of pain! He is crying. He needs to have PT at his house please AND some kind of pain meds, pregabaldin or duloxetine or even a little bit of an opiate. Please!

## 2024-06-13 NOTE — TELEPHONE ENCOUNTER
I talked to patient's daughter Joyce over concerns expressed in [employer] message.  Patient is in a significant amount of pain but this is unchanged from previous.  He is taking ibuprofen for pain.  He has to sleep in the couch.  I see that home health PT order has been placed but they state home health PT has not come.  They are having home health nurse come to the house.  Placing order for lumbar epidural.  Discussed this may worsen his diabetes.  Continue over-the-counter medicines and patient would need to be seen to change his long-term meds or start gabapentin.

## 2024-06-14 ENCOUNTER — SPECIALTY PHARMACY (OUTPATIENT)
Dept: ENDOCRINOLOGY | Age: 74
End: 2024-06-14
Payer: MEDICARE

## 2024-06-14 RX ORDER — GEMFIBROZIL 600 MG/1
600 TABLET, FILM COATED ORAL 2 TIMES DAILY
Qty: 60 TABLET | Refills: 1 | Status: SHIPPED | OUTPATIENT
Start: 2024-06-14

## 2024-06-14 NOTE — PROGRESS NOTES
Specialty Pharmacy Refill Coordination Note     Brian is a 73 y.o. male contacted today regarding refills of  5 specialty medication(s).    Reviewed and verified with patient:       Specialty medication(s) and dose(s) confirmed: yes    Refill Questions      Flowsheet Row Most Recent Value   Changes to allergies? No   Changes to medications? No   New conditions or infections since last clinic visit No   Unplanned office visit, urgent care, ED, or hospital admission in the last 4 weeks  No   How does patient/caregiver feel medication is working? Good   Financial problems or insurance changes  No   Since the previous refill, were any specialty medication doses or scheduled injections missed or delayed?  No   Does this patient require a clinical escalation to a pharmacist? No            Delivery Questions      Flowsheet Row Most Recent Value   Delivery method Beeline   Delivery address verified with patient/caregiver? Yes   Delivery address Home   Number of medications in delivery 5   Medication(s) being filled and delivered Gemfibrozil, Insulin Lispro, Insulin Glargine, Duloxetine Hcl (Serotonin-Norepinephrine Reuptake Inhibitors (Snris)), Insulin Pen Needle   Doses left of specialty medications 1 week   Copay verified? Yes   Copay amount 71.98   Copay form of payment Credit/debit on file                   Follow-up: 25 day(s)     Virginia Ontiveros, Pharmacy Technician  Specialty Pharmacy Technician

## 2024-06-28 ENCOUNTER — OFFICE VISIT (OUTPATIENT)
Dept: ENDOCRINOLOGY | Age: 74
End: 2024-06-28
Payer: MEDICARE

## 2024-06-28 VITALS
SYSTOLIC BLOOD PRESSURE: 108 MMHG | DIASTOLIC BLOOD PRESSURE: 68 MMHG | OXYGEN SATURATION: 95 % | WEIGHT: 285.5 LBS | TEMPERATURE: 97.3 F | HEIGHT: 69 IN | HEART RATE: 109 BPM | BODY MASS INDEX: 42.29 KG/M2

## 2024-06-28 DIAGNOSIS — Z79.4 TYPE 2 DIABETES MELLITUS WITH HYPERGLYCEMIA, WITH LONG-TERM CURRENT USE OF INSULIN: Primary | ICD-10-CM

## 2024-06-28 DIAGNOSIS — E11.65 TYPE 2 DIABETES MELLITUS WITH HYPERGLYCEMIA, WITH LONG-TERM CURRENT USE OF INSULIN: Primary | ICD-10-CM

## 2024-06-28 DIAGNOSIS — E78.2 MIXED HYPERLIPIDEMIA: ICD-10-CM

## 2024-06-28 DIAGNOSIS — E83.52 HYPERCALCEMIA: ICD-10-CM

## 2024-06-28 RX ORDER — PIOGLITAZONEHYDROCHLORIDE 30 MG/1
TABLET ORAL
COMMUNITY
Start: 2024-04-02

## 2024-06-28 RX ORDER — ROSUVASTATIN CALCIUM 10 MG/1
TABLET, COATED ORAL
COMMUNITY
Start: 2024-04-02

## 2024-06-28 NOTE — ASSESSMENT & PLAN NOTE
Diabetes is  uncontrolled .   Medication changes per orders.  Reminded to bring in blood sugar diary at next visit.  Recommended an ADA diet.  Recommended a Mediterranean style of eating  Regular aerobic exercise.  Discussed ways to avoid symptomatic hypoglycemia.  Discussed sick day management.  Discussed foot care.  Reminded to get yearly retinal exam.  Continue Lantus 60 units twice daily  If pre-breakfast sugar is below 100 on 2 consecutive days, then decrease your long-acting insulin by 2 units.  Take Humalog 22 units with meals  Continue the current dose of metformin and Actos  Continue Ozempic 0.5 mg weekly, if has recurrent diarrhea needs to stop the medication and notify me  Per our record he is due for refills however states that he does not refills  Repeat labs before next visit  Diabetes will be reassessed  4 months

## 2024-06-28 NOTE — ASSESSMENT & PLAN NOTE
Takes Tums every night  Instructed to stop taking Tums  Can take PPI or H2 blocker for acid reflux  Repeat CMP before next visit if still has persistent hypercalcemia we will do the further workup

## 2024-06-28 NOTE — PATIENT INSTRUCTIONS
[facility]  [address] [place] [address]    Today's Date: 6/28/2024    Insulin Dosing Instructions    YOUR INSULIN DOSING HAS BEEN CHANGED TO: Before Breakfast Before Lunch Before Evening Meal Bedtime   Long-Acting Insulins Basaglar (Glargine)  Lantus (Glargine)  Levemir (Detemir)  Semglee (Glargine)  Toujeo (Glargine)  Tresiba (Degludec) 60  60    Rapid-Acting Insulins Admelog (Lispro)  Apidra (Glulisine)  Flasp (Aspart)  Humalog (Lispro)  Lyumjev (Lispro)  Novolog (Aspart) 22 22 22    NPH Insulin Humulin/Novolin N       Regular Insulin Humulin/Novolin R       Premixed Insulin Humalog/Humulin  Novolog/Novolin  Mix 70/30, 75/25, or 50/50       Sliding Scale for Rapid-acting   or regular insulin based on blood sugar 151-200 + + + +    201-250 + + + +    251-300 + + + +    301-350 + + + +    >350 + + + +     Adjusting your long-acting insulin:      If pre-breakfast sugar is below 100 on 2 consecutive days, then decrease your long-acting insulin by 2 units.

## 2024-06-28 NOTE — PROGRESS NOTES
"Chief Complaint  Diabetes and Hyperlipidemia    Subjective        Brian Christensen presents to [facility]  for follow up.      History of Present Illness      Diagnosed with type 2 diabetes about 40 years ago.  Denies diabetic retinopathy, neuropathy and nephropathy.     Insulin use:Yes    Status of disease: Uncontrolled   Last A1c  11.3 % June 2024      Current regimen: Metfromin 1000 mg BID, Actos 30 mg daily,   Lantus 60 unts BID instead of daily   Humalog  20 units with meals   Ozempic 0.5 mg weekly, reports diarrhea for 2 days now resolved    That was the first time that he had diarrhea     Has a freestyle destini     CGM downloaded and reviewed  Average glucose 218  Glucose variability 38.9%  Very high 36 %  High 34 %  Target range 25 %  Low 4%  Very low 1%    Blood sugar tracing is above target range all day.  Denies low blood sugar less than 70     Denies personal or family history of pancreatitis or pancreatic cancer.  Denies personal or family history of thyroid cancer.     Has mixed hyperlipidemia Takes Crestor gemfibrozil 600 mg twice daily.      Takes Tums every night   Complains of severe back pain    Objective   Vital Signs:  /68   Pulse 109   Temp 97.3 °F (36.3 °C) (Temporal)   Ht 175.3 cm (69.02\")   Wt 130 kg (285 lb 8 oz)   SpO2 95%   BMI 42.14 kg/m²   Estimated body mass index is 42.14 kg/m² as calculated from the following:    Height as of this encounter: 175.3 cm (69.02\").    Weight as of this encounter: 130 kg (285 lb 8 oz).                   Physical Exam  Constitutional:       Appearance: He is obese.   HENT:      Head: Normocephalic and atraumatic.   Eyes:      Extraocular Movements: Extraocular movements intact.   Cardiovascular:      Rate and Rhythm: Normal rate and regular rhythm.   Pulmonary:      Effort: Pulmonary effort is normal.      Breath sounds: Normal breath sounds. No wheezing.   Abdominal:      Palpations: Abdomen is soft.      Tenderness: " There is no abdominal tenderness.   Musculoskeletal:         General: No swelling. Normal range of motion.      Cervical back: Neck supple. No tenderness.   Neurological:      Mental Status: He is alert and oriented to person, place, and time.   Psychiatric:         Mood and Affect: Mood normal.        Result Review :  The following data was reviewed by: Mahrokh Nokhbehzaeim, MD on 06/28/2024:  CMP          1/9/2024    14:08 3/22/2024    11:13 6/21/2024    08:12   CMP   Glucose 406  92  67    BUN 15  16  30    Creatinine 0.98  1.05  1.22    EGFR  75.4     Sodium 135  137  140    Potassium 4.7  4.0  4.7    Chloride 95  102  101    Calcium 9.7  9.9  10.9    Total Protein 7.3   7.3    Total Protein  7.1     Albumin 4.3  4.0  4.4    Globulin 3.0   2.9    Globulin  3.1     Total Bilirubin 0.5  0.3  0.8    Alkaline Phosphatase 66  63  51    AST (SGOT) 28  19  17    ALT (SGPT) 31  24  24    Albumin/Globulin Ratio  1.3     BUN/Creatinine Ratio 15  15.2  24.6    Anion Gap  9.8       CMP          1/9/2024    14:08 3/22/2024    11:13 6/21/2024    08:12   CMP   Glucose 406  92  67    BUN 15  16  30    Creatinine 0.98  1.05  1.22    EGFR  75.4     Sodium 135  137  140    Potassium 4.7  4.0  4.7    Chloride 95  102  101    Calcium 9.7  9.9  10.9    Total Protein 7.3   7.3    Total Protein  7.1     Albumin 4.3  4.0  4.4    Globulin 3.0   2.9    Globulin  3.1     Total Bilirubin 0.5  0.3  0.8    Alkaline Phosphatase 66  63  51    AST (SGOT) 28  19  17    ALT (SGPT) 31  24  24    Albumin/Globulin Ratio  1.3     BUN/Creatinine Ratio 15  15.2  24.6    Anion Gap  9.8        CBC w/diff          12/2/2023    06:15 12/3/2023    06:09 1/9/2024    14:08   CBC w/Diff   WBC 5.94  6.53  8.6    RBC 4.59  4.57  4.99    Hemoglobin 13.1  13.3  14.5    Hematocrit 40.3  40.1  43.8    MCV 87.8  87.7  88    MCH 28.5  29.1  29.1    MCHC 32.5  33.2  33.1    RDW 13.1  13.2  13.0    Platelets 311  310  377    Neutrophil Rel %  53.9  65    Immature  Granulocyte Rel %  0.3     Lymphocyte Rel %  33.7  23    Monocyte Rel %  9.0  7    Eosinophil Rel %  2.5  3    Basophil Rel %  0.6  1       Lipid Panel          1/9/2024    14:08 3/22/2024    11:13 6/21/2024    08:12   Lipid Panel   Total Cholesterol  128     Total Cholesterol 140   197    Triglycerides 326  346  332    HDL Cholesterol 31  31  32    VLDL Cholesterol 51  52  57    LDL Cholesterol  58  45  108    LDL/HDL Ratio  0.90        Most Recent A1C          6/21/2024    08:12   HGBA1C Most Recent   Hemoglobin A1C 11.30          A1C Last 3 Results          12/7/2023    13:53 3/22/2024    11:13 6/21/2024    08:12   HGBA1C Last 3 Results   Hemoglobin A1C 11.70  11.50  11.30       Microalbumin          12/7/2023    13:59 3/22/2024    11:21 6/21/2024    08:12   Microalbumin   Microalbumin, Urine 18.6  2.5  <3.0                  Assessment and Plan   Diagnoses and all orders for this visit:    1. Type 2 diabetes mellitus with hyperglycemia, with long-term current use of insulin (Primary)  Assessment & Plan:  Diabetes is  uncontrolled .   Medication changes per orders.  Reminded to bring in blood sugar diary at next visit.  Recommended an ADA diet.  Recommended a Mediterranean style of eating  Regular aerobic exercise.  Discussed ways to avoid symptomatic hypoglycemia.  Discussed sick day management.  Discussed foot care.  Reminded to get yearly retinal exam.  Continue Lantus 60 units twice daily  If pre-breakfast sugar is below 100 on 2 consecutive days, then decrease your long-acting insulin by 2 units.  Take Humalog 22 units with meals  Continue the current dose of metformin and Actos  Continue Ozempic 0.5 mg weekly, if has recurrent diarrhea needs to stop the medication and notify me  Per our record he is due for refills however states that he does not refills  Repeat labs before next visit  Diabetes will be reassessed  4 months    Orders:  -     Hemoglobin A1c; Future  -     Comprehensive Metabolic Panel;  Future  -     Microalbumin / Creatinine Urine Ratio - Urine, Clean Catch; Future  -     Lipid Panel; Future    2. Mixed hyperlipidemia  Assessment & Plan:  Continue the current medication  Repeat labs before next visit    Orders:  -     Hemoglobin A1c; Future  -     Comprehensive Metabolic Panel; Future  -     Microalbumin / Creatinine Urine Ratio - Urine, Clean Catch; Future  -     Lipid Panel; Future    3. Hypercalcemia  Assessment & Plan:  Takes Tums every night  Instructed to stop taking Tums  Can take PPI or H2 blocker for acid reflux  Repeat CMP before next visit if still has persistent hypercalcemia we will do the further workup    Orders:  -     PTH, Intact; Future      Has an appointment with his PCP for back pain in next week       Follow Up   Return in about 4 months (around 10/28/2024).  Patient was given instructions and counseling regarding his condition or for health maintenance advice. Please see specific information pulled into the AVS if appropriate.

## 2024-07-02 ENCOUNTER — TELEPHONE (OUTPATIENT)
Dept: INTERNAL MEDICINE | Facility: CLINIC | Age: 74
End: 2024-07-02
Payer: MEDICARE

## 2024-07-02 NOTE — TELEPHONE ENCOUNTER
[place]- Supa with [facility] calling wanting a verbal order for pt to be educated about diabetes and pain management for 1x a week for 6 weeks- Dr. Marianela Yepez's MA, gave me the verbal okay to give to Supa for verbal order.

## 2024-07-03 ENCOUNTER — OFFICE VISIT (OUTPATIENT)
Dept: INTERNAL MEDICINE | Facility: CLINIC | Age: 74
End: 2024-07-03
Payer: MEDICARE

## 2024-07-03 VITALS
HEIGHT: 69 IN | BODY MASS INDEX: 42.78 KG/M2 | OXYGEN SATURATION: 93 % | SYSTOLIC BLOOD PRESSURE: 100 MMHG | WEIGHT: 288.8 LBS | HEART RATE: 100 BPM | DIASTOLIC BLOOD PRESSURE: 60 MMHG

## 2024-07-03 DIAGNOSIS — R41.3 MEMORY LOSS: ICD-10-CM

## 2024-07-03 DIAGNOSIS — M51.36 DDD (DEGENERATIVE DISC DISEASE), LUMBAR: Primary | ICD-10-CM

## 2024-07-03 DIAGNOSIS — H26.9 CATARACT OF RIGHT EYE, UNSPECIFIED CATARACT TYPE: ICD-10-CM

## 2024-07-03 PROCEDURE — 3046F HEMOGLOBIN A1C LEVEL >9.0%: CPT | Performed by: STUDENT IN AN ORGANIZED HEALTH CARE EDUCATION/TRAINING PROGRAM

## 2024-07-03 PROCEDURE — 3074F SYST BP LT 130 MM HG: CPT | Performed by: STUDENT IN AN ORGANIZED HEALTH CARE EDUCATION/TRAINING PROGRAM

## 2024-07-03 PROCEDURE — 99214 OFFICE O/P EST MOD 30 MIN: CPT | Performed by: STUDENT IN AN ORGANIZED HEALTH CARE EDUCATION/TRAINING PROGRAM

## 2024-07-03 PROCEDURE — 3078F DIAST BP <80 MM HG: CPT | Performed by: STUDENT IN AN ORGANIZED HEALTH CARE EDUCATION/TRAINING PROGRAM

## 2024-07-03 PROCEDURE — 1125F AMNT PAIN NOTED PAIN PRSNT: CPT | Performed by: STUDENT IN AN ORGANIZED HEALTH CARE EDUCATION/TRAINING PROGRAM

## 2024-07-03 RX ORDER — MELOXICAM 7.5 MG/1
7.5 TABLET ORAL DAILY
Qty: 30 TABLET | Refills: 0 | Status: SHIPPED | OUTPATIENT
Start: 2024-07-03

## 2024-07-03 RX ORDER — DULOXETINE 40 MG/1
40 CAPSULE, DELAYED RELEASE ORAL DAILY
Qty: 90 CAPSULE | Refills: 2 | Status: SHIPPED | OUTPATIENT
Start: 2024-07-03

## 2024-07-03 NOTE — PROGRESS NOTES
Ha Bear M.D.  Internal Medicine  [facility]  [address], [address]  [place] [address]  [phone]      Chief Complaint  Back pain    SUBJECTIVE    History of Present Illness    Brian Christensen is a 73 y.o. male who presents to the office today as an established patient that last saw me on 1/9/2024.     He reports back pain for years. He has Sprained several times. Has been hospitalized for back pain. Getting acutely worse in stepwise pattern throughout the past. MRI in June showed    He saw his endocrinologist recently for diabetes.  A1c remains elevated at 11.3%.    He has multilevel degenerative disease and spinal stenosis.    Patient switched from Prozac to duloxetine for pain.  Epidural was ordered for pain.  Patient referred to physical therapy. He is not getting physical therapy.     Taking Tylenol and Duloxtine. Patches fall off.     He is not taking meloxicam.     Back pain is aching,. No radiation to legs.back  pain localized past. Epidural. f     Tried flexeril in past without relief.     Feels weak in knees.     Requesting scooter    No bowel or bladder incontinence.    Pain 3/10. Walking pain 7-8/10.         Review of Systems    No Known Allergies     Outpatient Medications Marked as Taking for the 7/3/24 encounter (Office Visit) with Ha Bear MD   Medication Sig Dispense Refill    albuterol sulfate  (90 Base) MCG/ACT inhaler Inhale 1 puff Every 4 (Four) Hours As Needed for Wheezing. 8.5 g 3    Blood Glucose Monitoring Suppl (Blood Glucose Monitor System) w/Device kit Use to check finger stick blood sugar _ times per day. 1 each 0    Blood Glucose Monitoring Suppl (Blood Glucose Monitor System) w/Device kit Use as directed to check blood sugar 1 each 0    Comfort EZ Pen Needles 31G X 5 MM misc USE 3 TIMES DAILY WITH INSULIN PEN      Continuous Blood Gluc  (FreeStyle Ada 14 Day [place]) device Use 1 Device Every 14 (Fourteen) Days. 6 each 0     Continuous Blood Gluc  (FreeStyle Ada 14 Day [place]) device Use as Directed 5 each 0    Continuous Blood Gluc Sensor (FreeStyle Ada 2 Sensor) misc Use 1 each Every 14 (Fourteen) Days. 2 each 0    Continuous Blood Gluc Sensor (FreeStyle Ada 2 Sensor) misc use for 14 days then replace sensor. 2 each 0    Continuous Blood Gluc Sensor (FreeStyle Ada Sensor System) Use Every 10 (Ten) Days. 4 each 5    Continuous Glucose Sensor (FreeStyle Ada 14 Day Sensor) misc use and replace every 2 weeks as directed. 4 each 4    DULoxetine HCl 40 MG capsule delayed-release particles Take 1 capsule by mouth Daily. 90 capsule 2    gemfibrozil (LOPID) 600 MG tablet Take 1 tablet by mouth 2 (Two) Times a Day. 60 tablet 1    Glucose Blood (Blood Glucose Test) strip Use with blood glucose meter to check blood sugar 5 times per day. 100 each 2    Insulin Glargine (LANTUS SOLOSTAR) 100 UNIT/ML injection pen Inject 60 Units under the skin Daily. If fasting blood sugar above 140 on 2 consecutive days can increase the dose by 2 units.  Max daily dose 80 units 30 mL 3    Insulin Lispro, 1 Unit Dial, (HumaLOG KwikPen) 100 UNIT/ML solution pen-injector Inject 20 Units under the skin 3 Times a Day Before Meals. Use Sliding Scale as Needed. Max 75 units daily 15 mL 0    Insulin Pen Needle (Pen Needles) 32G X 4 MM misc Use 1 each 2 (Two) Times a Day. 100 each 5    Insulin Pen Needle 31G X 5 MM misc use 3 times daily with insulin pen. 100 each 4    Insulin Syringes, Disposable, U-100 0.5 ML misc Use needle 5 times daily for injection into the skin of the abdomen or upper outside of the thighs.   Do not reuse needles. 100 each 2    Lancets Thin misc Use with lancet device to check blood glucose 5 times per day via finger stick. 100 each 1    lisinopril-hydrochlorothiazide (PRINZIDE,ZESTORETIC) 20-25 MG per tablet Take 1 tablet by mouth Daily. 90 tablet 1    meloxicam (Mobic) 7.5 MG tablet Take 1 tablet by mouth Daily. 30 tablet 0     "metFORMIN (GLUCOPHAGE) 1000 MG tablet Take 1 tablet by mouth 2 (Two) Times a Day With Meals. 240 tablet 0    miconazole (MICOTIN) 2 % cream Apply 1 Application topically to the appropriate area as directed 2 (Two) Times a Day. Skin fold      pioglitazone (ACTOS) 30 MG tablet 1 tablet DAILY (route: oral)      rosuvastatin (CRESTOR) 10 MG tablet 1 tablet BEDTIME (route: oral)      Semaglutide,0.25 or 0.5MG/DOS, (Ozempic, 0.25 or 0.5 MG/DOSE,) 2 MG/3ML solution pen-injector Inject 0.25 mg under the skin into the appropriate area as directed 1 (One) Time Per Week. Start at 0.25 mg weekly for 4 weeks, then increased to 0.5 mg weekly (Patient taking differently: Inject 0.5 mg under the skin into the appropriate area as directed 1 (One) Time Per Week. Start at 0.25 mg weekly for 4 weeks, then increased to 0.5 mg weekly) 3 mL 1    [DISCONTINUED] DULoxetine (CYMBALTA) 20 MG capsule Take 1 capsule by mouth Daily. 30 capsule 0    [DISCONTINUED] meloxicam (Mobic) 15 MG tablet Take 1 tablet by mouth Daily. 30 tablet 1        Past Medical History:   Diagnosis Date    Arthritis     Carpal boss, left     Coronary artery disease     Diabetes mellitus     Heartburn     Hyperlipidemia     Hypertension     Leg fracture, right      Past Surgical History:   Procedure Laterality Date    COLONOSCOPY      FRACTURE SURGERY      TONSILLECTOMY       Family History   Problem Relation Age of Onset    Prostate cancer Father     reports that he quit smoking about 41 years ago. His smoking use included cigarettes. He started smoking about 61 years ago. He has a 20 pack-year smoking history. He has never used smokeless tobacco. He reports that he does not currently use alcohol. He reports that he does not currently use drugs.    OBJECTIVE    Vital Signs:   /60   Pulse 100   Ht 175.3 cm (69.02\")   Wt 131 kg (288 lb 12.8 oz)   SpO2 93%   BMI 42.63 kg/m²     Physical Exam  Constitutional:       Appearance: Normal appearance. "   Musculoskeletal:      Thoracic back: No tenderness or bony tenderness. Decreased range of motion.      Comments: Patient had obvious significant pain when walking and significant difficulty getting on and maneuvering on exam table.  Straight leg raise could not be performed.  Patient unable to lay back on exam table.   Skin:     General: Skin is warm and dry.   Neurological:      Mental Status: He is alert.      Motor: No weakness or atrophy.      Deep Tendon Reflexes:      Reflex Scores:       Patellar reflexes are 1+ on the right side and 1+ on the left side.  Psychiatric:         Mood and Affect: Mood normal.         Behavior: Behavior normal.         Thought Content: Thought content normal.            The following data was reviewed by: Ha Bear MD on 07/03/2024:  CMP          1/9/2024    14:08 3/22/2024    11:13 6/21/2024    08:12   CMP   Glucose 406  92  67    BUN 15  16  30    Creatinine 0.98  1.05  1.22    EGFR  75.4     Sodium 135  137  140    Potassium 4.7  4.0  4.7    Chloride 95  102  101    Calcium 9.7  9.9  10.9    Total Protein 7.3   7.3    Total Protein  7.1     Albumin 4.3  4.0  4.4    Globulin 3.0   2.9    Globulin  3.1     Total Bilirubin 0.5  0.3  0.8    Alkaline Phosphatase 66  63  51    AST (SGOT) 28  19  17    ALT (SGPT) 31  24  24    Albumin/Globulin Ratio  1.3     BUN/Creatinine Ratio 15  15.2  24.6    Anion Gap  9.8       CBC w/diff          12/2/2023    06:15 12/3/2023    06:09 1/9/2024    14:08   CBC w/Diff   WBC 5.94  6.53  8.6    RBC 4.59  4.57  4.99    Hemoglobin 13.1  13.3  14.5    Hematocrit 40.3  40.1  43.8    MCV 87.8  87.7  88    MCH 28.5  29.1  29.1    MCHC 32.5  33.2  33.1    RDW 13.1  13.2  13.0    Platelets 311  310  377    Neutrophil Rel %  53.9  65    Immature Granulocyte Rel %  0.3     Lymphocyte Rel %  33.7  23    Monocyte Rel %  9.0  7    Eosinophil Rel %  2.5  3    Basophil Rel %  0.6  1      Lipid Panel          1/9/2024    14:08 3/22/2024    11:13 6/21/2024     08:12   Lipid Panel   Total Cholesterol  128     Total Cholesterol 140   197    Triglycerides 326  346  332    HDL Cholesterol 31  31  32    VLDL Cholesterol 51  52  57    LDL Cholesterol  58  45  108    LDL/HDL Ratio  0.90         A1C Last 3 Results          12/7/2023    13:53 3/22/2024    11:13 6/21/2024    08:12   HGBA1C Last 3 Results   Hemoglobin A1C 11.70  11.50  11.30      Data reviewed : Previous orthopedic note          MRI Lumbar Spine Without Contrast (06/01/2024 16:34)     ASSESSMENT & PLAN     Diagnoses and all orders for this visit:    1. DDD (degenerative disc disease), lumbar (Primary)  -     meloxicam (Mobic) 7.5 MG tablet; Take 1 tablet by mouth Daily.  Dispense: 30 tablet; Refill: 0  -     DULoxetine HCl 40 MG capsule delayed-release particles; Take 1 capsule by mouth Daily.  Dispense: 90 capsule; Refill: 2    2. Cataract of right eye, unspecified cataract type  -     Ambulatory Referral to Ophthalmology    3. Memory loss  -     Ambulatory Referral to Neuropsychology    73-year-old with hypertension, diabetes, hyperlipidemia here for back pain.  He has had this for many years but has been progressively worsening.  MRI performed showed Multilevel degenerative disease involving the lumbar spine.. There is moderate foraminal stenosis to  the right at L2-L3 secondary to loss of disc height and extension of  disc material into the neural foramen. This approaches but does not  definitively involve the right L2 nerve as it exits the neural foramen.   There is moderate to severe if not severe canal stenosis at L4-L5 and severe lateral recess narrowing bilaterally at L4-L5 secondary to a broad-based disc osteophyte complex and posterior element degenerative disease predominantly.  He denies radicular symptoms today.  Per his report he really has not been treated for this issue in the past.  He has been referred to PT.  Ordering epidural as well.  Starting meloxicam.  Encouraged adequate hydration.   Continue Tylenol and topical medications as needed.  Consider neurosurgery referral if no improvement.  His pain is quite significant and interfering with his mobility.  Could consider a low-dose of tramadol however I think he would be a high risk for falls.  Increasing duloxetine as well.  He does not think that this has been helping him.  [employer] are requesting a scooter.  He has significant difficulty with ambulation of any distance due to pain.  He would be able to operate a scooter.    His wife is concerned about memory loss.  Previously MMSE performed in clinic was normal however I do still have Some concerns given patient does not seem to have a good understanding of his disease despite having a medical background.  Could consider pseudodementia from depression.  Referring for neuropsych testing.    Health Maintenance Due   Topic Date Due    COLORECTAL CANCER SCREENING  Never done    TDAP/TD VACCINES (1 - Tdap) Never done    ZOSTER VACCINE (1 of 2) Never done    RSV Vaccine - Adults (1 - 1-dose 60+ series) Never done    AAA SCREEN (ONE-TIME)  Never done    COVID-19 Vaccine (1 - 2023-24 season) Never done        Follow Up  No follow-ups on file.    Patient/family had no further questions at this time and verbalized understanding of the plan discussed today.

## 2024-07-05 ENCOUNTER — SPECIALTY PHARMACY (OUTPATIENT)
Dept: ENDOCRINOLOGY | Age: 74
End: 2024-07-05
Payer: MEDICARE

## 2024-07-05 ENCOUNTER — READMISSION MANAGEMENT (OUTPATIENT)
Dept: CALL CENTER | Facility: HOSPITAL | Age: 74
End: 2024-07-05
Payer: MEDICARE

## 2024-07-05 DIAGNOSIS — Z79.4 TYPE 2 DIABETES MELLITUS WITH HYPERGLYCEMIA, WITH LONG-TERM CURRENT USE OF INSULIN: ICD-10-CM

## 2024-07-05 DIAGNOSIS — E11.65 TYPE 2 DIABETES MELLITUS WITH HYPERGLYCEMIA, WITH LONG-TERM CURRENT USE OF INSULIN: ICD-10-CM

## 2024-07-05 RX ORDER — GEMFIBROZIL 600 MG/1
600 TABLET, FILM COATED ORAL 2 TIMES DAILY
Qty: 60 TABLET | Refills: 0 | Status: SHIPPED | OUTPATIENT
Start: 2024-07-05

## 2024-07-05 RX ORDER — ROSUVASTATIN CALCIUM 10 MG/1
10 TABLET, COATED ORAL NIGHTLY
Qty: 90 TABLET | Refills: 3 | Status: SHIPPED | OUTPATIENT
Start: 2024-07-05

## 2024-07-05 RX ORDER — ROSUVASTATIN CALCIUM 10 MG/1
10 TABLET, COATED ORAL NIGHTLY
Qty: 90 TABLET | Refills: 3 | OUTPATIENT
Start: 2024-07-05

## 2024-07-05 RX ORDER — PIOGLITAZONEHYDROCHLORIDE 30 MG/1
30 TABLET ORAL DAILY
Qty: 90 TABLET | Refills: 2 | Status: SHIPPED | OUTPATIENT
Start: 2024-07-05

## 2024-07-05 RX ORDER — INSULIN LISPRO 100 [IU]/ML
25 INJECTION, SOLUTION INTRAVENOUS; SUBCUTANEOUS
Qty: 15 ML | Refills: 0 | Status: SHIPPED | OUTPATIENT
Start: 2024-07-05

## 2024-07-05 NOTE — TELEPHONE ENCOUNTER
Rx Refill Note  Requested Prescriptions     Pending Prescriptions Disp Refills    Insulin Lispro, 1 Unit Dial, (HumaLOG KwikPen) 100 UNIT/ML solution pen-injector 15 mL 0     Sig: Inject 20 Units under the skin 3 Times a Day Before Meals. Use Sliding Scale as Needed. Max 75 units daily      Last office visit with prescribing clinician: 6/28/2024   Last telemedicine visit with prescribing clinician: Visit date not found   Next office visit with prescribing clinician: 10/30/2024                         Would you like a call back once the refill request has been completed: [] Yes [] No    If the office needs to give you a call back, can they leave a voicemail: [] Yes [] No    Lucille Mcghee  07/05/24, 12:04 EDT

## 2024-07-05 NOTE — TELEPHONE ENCOUNTER
Rx Refill Note  Requested Prescriptions     Pending Prescriptions Disp Refills    pioglitazone (ACTOS) 30 MG tablet 90 tablet 2     Sig: Take 1 tablet by mouth Daily.      Last office visit with prescribing clinician: 6/28/2024   Last telemedicine visit with prescribing clinician: 1/25/2024   Next office visit with prescribing clinician: 10/30/2024                         Would you like a call back once the refill request has been completed: [] Yes [] No    If the office needs to give you a call back, can they leave a voicemail: [] Yes [] No    Lucille Mcghee  07/05/24, 10:11 EDT

## 2024-07-05 NOTE — TELEPHONE ENCOUNTER
Rx Refill Note  Requested Prescriptions     Pending Prescriptions Disp Refills    rosuvastatin (Crestor) 10 MG tablet 90 tablet 3     Sig: Take 1 tablet by mouth Every Night.      Last office visit with prescribing clinician: 6/28/2024   Last telemedicine visit with prescribing clinician: Visit date not found   Next office visit with prescribing clinician: 7/5/2024                         Would you like a call back once the refill request has been completed: [] Yes [] No    If the office needs to give you a call back, can they leave a voicemail: [] Yes [] No    Lucille Mcghee  07/05/24, 10:10 EDT

## 2024-07-05 NOTE — PROGRESS NOTES
Specialty Pharmacy Refill Coordination Note     Brian is a 73 y.o. male contacted today regarding refills of  10 specialty medication(s).    Reviewed and verified with patient:       Specialty medication(s) and dose(s) confirmed: yes    Refill Questions      Flowsheet Row Most Recent Value   Changes to allergies? No   Changes to medications? No   New conditions or infections since last clinic visit No   Unplanned office visit, urgent care, ED, or hospital admission in the last 4 weeks  No   How does patient/caregiver feel medication is working? Good   Financial problems or insurance changes  No   Since the previous refill, were any specialty medication doses or scheduled injections missed or delayed?  No   Does this patient require a clinical escalation to a pharmacist? No            Delivery Questions      Flowsheet Row Most Recent Value   Delivery method Beeline   Delivery address verified with patient/caregiver? Yes   Delivery address Home   Number of medications in delivery 10   Medication(s) being filled and delivered Duloxetine Hcl (Serotonin-Norepinephrine Reuptake Inhibitors (Snris)), Continuous Glucose Sensor, Gemfibrozil, Insulin Glargine, Insulin Lispro, Lisinopril-Hydrochlorothiazide, Meloxicam (Nonsteroidal Anti-Inflammatory Agents (Nsaids)), Metformin Hcl (Biguanides), Pioglitazone Hcl, Rosuvastatin Calcium   Doses left of specialty medications 1 week   Copay verified? Yes   Copay amount [id number]   Copay form of payment Credit/debit on file   Ship Date 07/08   Delivery Date 07/09   Signature Required No                   Follow-up: 25 day(s)     Virginia Ontiveros, Pharmacy Technician  Specialty Pharmacy Technician

## 2024-07-05 NOTE — TELEPHONE ENCOUNTER
Rx Refill Note  Requested Prescriptions     Pending Prescriptions Disp Refills    rosuvastatin (Crestor) 10 MG tablet 90 tablet 3     Sig: Take 1 tablet by mouth Every Night.      Last office visit with prescribing clinician: 6/28/2024   Last telemedicine visit with prescribing clinician: Visit date not found   Next office visit with prescribing clinician: 10/30/2024                         Would you like a call back once the refill request has been completed: [] Yes [] No    If the office needs to give you a call back, can they leave a voicemail: [] Yes [] No    Lucille Mcghee  07/05/24, 10:37 EDT

## 2024-07-05 NOTE — OUTREACH NOTE
Prep Survey      Flowsheet Row Responses   [facility] facility patient discharged from? Non-BH   Is LACE score < 7 ? Non-BH Discharge   Eligibility Not Eligible   What are the reasons patient is not eligible? Other  [no recent external discharge noted]   Does the patient have one of the following disease processes/diagnoses(primary or secondary)? Other   Prep survey completed? Yes            Megan Zuniga Registered Nurse

## 2024-07-10 ENCOUNTER — TELEPHONE (OUTPATIENT)
Dept: INTERNAL MEDICINE | Facility: CLINIC | Age: 74
End: 2024-07-10

## 2024-07-10 NOTE — TELEPHONE ENCOUNTER
Caller: SOTO    Relationship: [facility]    Best call back number: [phone]     What was the call regarding: DID INITIAL EVALUATION FOR PHYSICAL THERAPY WITH PATIENT TODAY. STATES THAT THERE IS NOTHING THAT PHYSICAL THERAPY CAN DO FOR PATIENT, AND WILL NOT NEED TO PROCEED WITH FURTHER THERAPY

## 2024-07-16 ENCOUNTER — SPECIALTY PHARMACY (OUTPATIENT)
Dept: ENDOCRINOLOGY | Age: 74
End: 2024-07-16
Payer: MEDICARE

## 2024-07-16 NOTE — PROGRESS NOTES
Specialty Pharmacy Refill Coordination Note     Brian is a 73 y.o. male contacted today regarding refills of  1 specialty medication(s).    Reviewed and verified with patient:       Specialty medication(s) and dose(s) confirmed: yes    Refill Questions      Flowsheet Row Most Recent Value   Changes to allergies? No   Changes to medications? No   New conditions or infections since last clinic visit No   Unplanned office visit, urgent care, ED, or hospital admission in the last 4 weeks  No   How does patient/caregiver feel medication is working? Good   Financial problems or insurance changes  No   Since the previous refill, were any specialty medication doses or scheduled injections missed or delayed?  No   Does this patient require a clinical escalation to a pharmacist? No            Delivery Questions      Flowsheet Row Most Recent Value   Delivery method Beeline   Delivery address verified with patient/caregiver? Yes   Delivery address Home   Number of medications in delivery 1   Medication(s) being filled and delivered Semaglutide   Doses left of specialty medications 1 week   Copay verified? Yes   Copay amount [id number]   Copay form of payment Credit/debit on file   Ship Date 07/16   Delivery Date 07/17   Signature Required No                   Follow-up: 25 day(s)     Virginia Ontiveros, Pharmacy Technician  Specialty Pharmacy Technician

## 2024-07-23 RX ORDER — INSULIN LISPRO 100 [IU]/ML
25 INJECTION, SOLUTION INTRAVENOUS; SUBCUTANEOUS
Qty: 15 ML | Refills: 0 | Status: SHIPPED | OUTPATIENT
Start: 2024-07-23

## 2024-07-23 NOTE — TELEPHONE ENCOUNTER
Rx Refill Note  Requested Prescriptions     Pending Prescriptions Disp Refills    Insulin Lispro, 1 Unit Dial, (HumaLOG KwikPen) 100 UNIT/ML solution pen-injector 15 mL 0     Sig: Inject 20 Units under the skin 3 Times a Day Before Meals. Use Sliding Scale as Needed. Max 75 units daily      Last office visit with prescribing clinician: 6/28/2024   Last telemedicine visit with prescribing clinician: Visit date not found   Next office visit with prescribing clinician: 10/30/2024                         Would you like a call back once the refill request has been completed: [] Yes [] No    If the office needs to give you a call back, can they leave a voicemail: [] Yes [] No    Lucille Mcghee  07/23/24, 08:02 EDT

## 2024-08-05 ENCOUNTER — ANESTHESIA (OUTPATIENT)
Dept: PAIN MEDICINE | Facility: HOSPITAL | Age: 74
End: 2024-08-05
Payer: MEDICARE

## 2024-08-05 ENCOUNTER — HOSPITAL ENCOUNTER (OUTPATIENT)
Dept: PAIN MEDICINE | Facility: HOSPITAL | Age: 74
Discharge: HOME | End: 2024-08-05
Payer: MEDICARE

## 2024-08-05 ENCOUNTER — ANESTHESIA EVENT (OUTPATIENT)
Dept: PAIN MEDICINE | Facility: HOSPITAL | Age: 74
End: 2024-08-05
Payer: MEDICARE

## 2024-08-05 ENCOUNTER — HOSPITAL ENCOUNTER (OUTPATIENT)
Dept: GENERAL RADIOLOGY | Facility: HOSPITAL | Age: 74
Discharge: HOME | End: 2024-08-05
Payer: MEDICARE

## 2024-08-05 VITALS
HEART RATE: 99 BPM | RESPIRATION RATE: 16 BRPM | SYSTOLIC BLOOD PRESSURE: 124 MMHG | TEMPERATURE: 97.5 F | DIASTOLIC BLOOD PRESSURE: 72 MMHG | OXYGEN SATURATION: 90 %

## 2024-08-05 DIAGNOSIS — R52 PAIN: ICD-10-CM

## 2024-08-05 DIAGNOSIS — M48.062 SPINAL STENOSIS OF LUMBAR REGION WITH NEUROGENIC CLAUDICATION: Primary | ICD-10-CM

## 2024-08-05 PROCEDURE — 25010000002 METHYLPREDNISOLONE PER 80 MG: Performed by: ANESTHESIOLOGY

## 2024-08-05 PROCEDURE — 25510000001 IOPAMIDOL 41 % SOLUTION: Performed by: ANESTHESIOLOGY

## 2024-08-05 PROCEDURE — 77003 FLUOROGUIDE FOR SPINE INJECT: CPT

## 2024-08-05 RX ORDER — MIDAZOLAM HYDROCHLORIDE 1 MG/ML
1 INJECTION INTRAMUSCULAR; INTRAVENOUS
Status: DISCONTINUED | OUTPATIENT
Start: 2024-08-05 | End: 2024-08-06 | Stop reason: HOSPADM

## 2024-08-05 RX ORDER — IOPAMIDOL 408 MG/ML
3 INJECTION, SOLUTION INTRATHECAL
Status: COMPLETED | OUTPATIENT
Start: 2024-08-05 | End: 2024-08-05

## 2024-08-05 RX ORDER — METHYLPREDNISOLONE ACETATE 80 MG/ML
80 INJECTION, SUSPENSION INTRA-ARTICULAR; INTRALESIONAL; INTRAMUSCULAR; SOFT TISSUE ONCE
Status: COMPLETED | OUTPATIENT
Start: 2024-08-05 | End: 2024-08-05

## 2024-08-05 RX ORDER — FENTANYL CITRATE 50 UG/ML
50 INJECTION, SOLUTION INTRAMUSCULAR; INTRAVENOUS AS NEEDED
Status: DISCONTINUED | OUTPATIENT
Start: 2024-08-05 | End: 2024-08-06 | Stop reason: HOSPADM

## 2024-08-05 RX ORDER — LIDOCAINE HYDROCHLORIDE 10 MG/ML
1 INJECTION, SOLUTION INFILTRATION; PERINEURAL ONCE
Status: DISCONTINUED | OUTPATIENT
Start: 2024-08-05 | End: 2024-08-06 | Stop reason: HOSPADM

## 2024-08-05 RX ADMIN — METHYLPREDNISOLONE ACETATE 80 MG: 80 INJECTION, SUSPENSION INTRA-ARTICULAR; INTRALESIONAL; INTRAMUSCULAR; SOFT TISSUE at 12:05

## 2024-08-05 RX ADMIN — IOPAMIDOL 10 ML: 408 INJECTION, SOLUTION INTRATHECAL at 12:04

## 2024-08-05 NOTE — H&P
[facility]    History and Physical    Patient Name: Brian Christensen  :  1950  MRN:  [id number]  Date of Admission: 2024    Subjective     Patient is a 74 y.o. male presents with chief complaint of chronic, constant, moderate, 2/10, unknown etiology, aching, stabbing  low back pain.  Onset of symptoms was gradual starting several years ago.  Symptoms are associated/aggravated by activity. Symptoms improve with pain medication    The following portions of the patients history were reviewed and updated as appropriate: current medications, allergies, past medical history, past surgical history, past family history, past social history, and problem list                Objective     Past Medical History:   Past Medical History:   Diagnosis Date    Arthritis     Carpal boss, left     Coronary artery disease     Diabetes mellitus     Heartburn     Hyperlipidemia     Hypertension     Leg fracture, right      Past Surgical History:   Past Surgical History:   Procedure Laterality Date    COLONOSCOPY      FRACTURE SURGERY      TONSILLECTOMY       Family History:   Family History   Problem Relation Age of Onset    Prostate cancer Father      Social History:   Social History     Socioeconomic History    Marital status:    Tobacco Use    Smoking status: Former     Current packs/day: 0.00     Average packs/day: 1 pack/day for 20.0 years (20.0 ttl pk-yrs)     Types: Cigarettes     Start date:      Quit date:      Years since quittin.6    Smokeless tobacco: Never   Vaping Use    Vaping status: Never Used   Substance and Sexual Activity    Alcohol use: Not Currently    Drug use: Not Currently    Sexual activity: Not Currently       Vital Signs Range for the last 24 hours  Temperature:     Temp Source:     BP:     Pulse:     Respirations:     SPO2:     O2 Amount (l/min):     O2 Devices     Weight:           --------------------------------------------------------------------------------    Current  Outpatient Medications   Medication Sig Dispense Refill    albuterol sulfate  (90 Base) MCG/ACT inhaler Inhale 1 puff Every 4 (Four) Hours As Needed for Wheezing. 8.5 g 3    Blood Glucose Monitoring Suppl (Blood Glucose Monitor System) w/Device kit Use to check finger stick blood sugar _ times per day. 1 each 0    Blood Glucose Monitoring Suppl (Blood Glucose Monitor System) w/Device kit Use as directed to check blood sugar 1 each 0    Comfort EZ Pen Needles 31G X 5 MM misc USE 3 TIMES DAILY WITH INSULIN PEN      Continuous Blood Gluc  (FreeStyle Ada 14 Day [place]) device Use 1 Device Every 14 (Fourteen) Days. 6 each 0    Continuous Blood Gluc  (FreeStyle Ada 14 Day [place]) device Use as Directed 5 each 0    Continuous Blood Gluc Sensor (FreeStyle Ada 2 Sensor) misc Use 1 each Every 14 (Fourteen) Days. 2 each 0    Continuous Blood Gluc Sensor (FreeStyle Ada 2 Sensor) misc use for 14 days then replace sensor. 2 each 0    Continuous Blood Gluc Sensor (FreeStyle Ada Sensor System) Use Every 10 (Ten) Days. 4 each 5    Continuous Glucose Sensor (FreeStyle Ada 14 Day Sensor) misc use and replace every 2 weeks as directed. 4 each 4    DULoxetine HCl 40 MG capsule delayed-release particles Take 1 capsule by mouth Daily. 90 capsule 2    gemfibrozil (LOPID) 600 MG tablet Take 1 tablet by mouth 2 (Two) Times a Day. 60 tablet 0    Glucose Blood (Blood Glucose Test) strip Use with blood glucose meter to check blood sugar 5 times per day. 100 each 2    Insulin Glargine (LANTUS SOLOSTAR) 100 UNIT/ML injection pen Inject 60 Units under the skin Daily. If fasting blood sugar above 140 on 2 consecutive days can increase the dose by 2 units.  Max daily dose 80 units 30 mL 3    Insulin Lispro, 1 Unit Dial, (HumaLOG KwikPen) 100 UNIT/ML solution pen-injector Inject 20 Units under the skin 3 Times a Day Before Meals. Use Sliding Scale as Needed. Max 75 units daily 15 mL 0    Insulin Pen Needle (Pen  Needles) 32G X 4 MM misc Use 1 each 2 (Two) Times a Day. 100 each 5    Insulin Pen Needle 31G X 5 MM misc use 3 times daily with insulin pen. 100 each 4    Insulin Syringes, Disposable, U-100 0.5 ML misc Use needle 5 times daily for injection into the skin of the abdomen or upper outside of the thighs.   Do not reuse needles. 100 each 2    Lancets Thin misc Use with lancet device to check blood glucose 5 times per day via finger stick. 100 each 1    lisinopril-hydrochlorothiazide (PRINZIDE,ZESTORETIC) 20-25 MG per tablet Take 1 tablet by mouth Daily. 90 tablet 1    meloxicam (Mobic) 7.5 MG tablet Take 1 tablet by mouth Daily. 30 tablet 0    metFORMIN (GLUCOPHAGE) 1000 MG tablet Take 1 tablet by mouth 2 (Two) Times a Day With Meals. 240 tablet 0    miconazole (MICOTIN) 2 % cream Apply 1 Application topically to the appropriate area as directed 2 (Two) Times a Day. Skin fold      pioglitazone (ACTOS) 30 MG tablet Take 1 tablet by mouth Daily. 90 tablet 2    rosuvastatin (Crestor) 10 MG tablet Take 1 tablet by mouth Every Night. 90 tablet 3    Semaglutide,0.25 or 0.5MG/DOS, (Ozempic, 0.25 or 0.5 MG/DOSE,) 2 MG/3ML solution pen-injector Inject 0.25 mg under the skin into the appropriate area as directed 1 (One) Time Per Week. Start at 0.25 mg weekly for 4 weeks, then increased to 0.5 mg weekly (Patient taking differently: Inject 0.5 mg under the skin into the appropriate area as directed 1 (One) Time Per Week. Start at 0.25 mg weekly for 4 weeks, then increased to 0.5 mg weekly) 3 mL 1     Current Facility-Administered Medications   Medication Dose Route Frequency Provider Last Rate Last Admin    fentaNYL citrate (PF) (SUBLIMAZE) injection 50 mcg  50 mcg Intravenous PRN Brian Garcia MD        iopamidol (ISOVUE-M 200) injection 41%  3 mL Epidural Once in imaging Brian Garcia MD        lidocaine (XYLOCAINE) 1 % injection 1 mL  1 mL Intradermal Once Brian Garcia MD        methylPREDNISolone acetate  (DEPO-medrol) injection 80 mg  80 mg Epidural Once Brian Garcia MD        midazolam (VERSED) injection 1 mg  1 mg Intravenous Q5 Min PRN Brian Garcia MD           --------------------------------------------------------------------------------  Assessment & Plan      Anesthesia Evaluation           Pain impairs ability to perform ADLs: Sleeping, Bathing, Driving, Dressing, Housekeeping and Exercise/Activity  Modalities previously tried to control pain with limited effectiveness within the last 4-6 weeks: Prescription medications, OTC medications, Ice, Heat and Rest     Airway   Mallampati: II  Dental      Pulmonary    Cardiovascular     (+) hypertension, CAD      Neuro/Psych  GI/Hepatic/Renal/Endo    (+) diabetes mellitus    Musculoskeletal     Abdominal    Substance History      OB/GYN          Other                     Diagnosis and Plan    Treatment Plan  ASA 3      Procedures: Lumbar Epidural Steroid Injection(LESI), With fluoroscopy,      Anesthetic plan and risks discussed with patient.          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 8.5 g 3    Blood Glucose Monitoring Suppl (Blood Glucose Monitor System) w/Device kit Use to check finger stick blood sugar _ times per day. 1 each 0    Blood Glucose Monitoring Suppl (Blood Glucose Monitor System) w/Device kit Use as directed to check blood sugar 1 each 0    Comfort EZ Pen Needles 31G X 5 MM misc USE 3 TIMES DAILY WITH INSULIN PEN      Continuous Blood Gluc  (FreeStyle Ada 14 Day [place]) device Use 1 Device Every 14  (Fourteen) Days. 6 each 0    Continuous Blood Gluc  (FreeStyle Ada 14 Day [place]) device Use as Directed 5 each 0    Continuous Blood Gluc Sensor (FreeStyle Ada 2 Sensor) misc Use 1 each Every 14 (Fourteen) Days. 2 each 0    Continuous Blood Gluc Sensor (FreeStyle Ada 2 Sensor) misc use for 14 days then replace sensor. 2 each 0    Continuous Blood Gluc Sensor (FreeStyle Ada Sensor System) Use Every 10 (Ten) Days. 4 each 5    Continuous Glucose Sensor (FreeStyle Ada 14 Day Sensor) misc use and replace every 2 weeks as directed. 4 each 4    DULoxetine HCl 40 MG capsule delayed-release particles Take 1 capsule by mouth Daily. 90 capsule 2    gemfibrozil (LOPID) 600 MG tablet Take 1 tablet by mouth 2 (Two) Times a Day. 60 tablet 0    Glucose Blood (Blood Glucose Test) strip Use with blood glucose meter to check blood sugar 5 times per day. 100 each 2    Insulin Glargine (LANTUS SOLOSTAR) 100 UNIT/ML injection pen Inject 60 Units under the skin Daily. If fasting blood sugar above 140 on 2 consecutive days can increase the dose by 2 units.  Max daily dose 80 units 30 mL 3    Insulin Lispro, 1 Unit Dial, (HumaLOG KwikPen) 100 UNIT/ML solution pen-injector Inject 20 Units under the skin 3 Times a Day Before Meals. Use Sliding Scale as Needed. Max 75 units daily 15 mL 0    Insulin Pen Needle (Pen Needles) 32G X 4 MM misc Use 1 each 2 (Two) Times a Day. 100 each 5    Insulin Pen Needle 31G X 5 MM misc use 3 times daily with insulin pen. 100 each 4    Insulin Syringes, Disposable, U-100 0.5 ML misc Use needle 5 times daily for injection into the skin of the abdomen or upper outside of the thighs.   Do not reuse needles. 100 each 2    Lancets Thin misc Use with lancet device to check blood glucose 5 times per day via finger stick. 100 each 1    lisinopril-hydrochlorothiazide (PRINZIDE,ZESTORETIC) 20-25 MG per tablet Take 1 tablet by mouth Daily. 90 tablet 1    meloxicam (Mobic) 7.5 MG tablet Take 1 tablet by  mouth Daily. 30 tablet 0    metFORMIN (GLUCOPHAGE) 1000 MG tablet Take 1 tablet by mouth 2 (Two) Times a Day With Meals. 240 tablet 0    miconazole (MICOTIN) 2 % cream Apply 1 Application topically to the appropriate area as directed 2 (Two) Times a Day. Skin fold      pioglitazone (ACTOS) 30 MG tablet Take 1 tablet by mouth Daily. 90 tablet 2    rosuvastatin (Crestor) 10 MG tablet Take 1 tablet by mouth Every Night. 90 tablet 3    Semaglutide,0.25 or 0.5MG/DOS, (Ozempic, 0.25 or 0.5 MG/DOSE,) 2 MG/3ML solution pen-injector Inject 0.25 mg under the skin into the appropriate area as directed 1 (One) Time Per Week. Start at 0.25 mg weekly for 4 weeks, then increased to 0.5 mg weekly (Patient taking differently: Inject 0.5 mg under the skin into the appropriate area as directed 1 (One) Time Per Week. Start at 0.25 mg weekly for 4 weeks, then increased to 0.5 mg weekly) 3 mL 1     No current facility-administered medications on file prior to encounter.       Past Medical History:   Diagnosis Date    Arthritis     Carpal boss, left     Coronary artery disease     Diabetes mellitus     Heartburn     Hyperlipidemia     Hypertension     Leg fracture, right          SOCIAL HISTORY:  No tobacco    REVIEW OF SYSTEMS:  No hematologic infectious or constitutional symptoms  Other review of systems non-contributory    PHYSICAL EXAM:  There were no vitals taken for this visit.  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  Lumbar epidural steroid injections, up to 4.  If pain control is  acceptable after 1 or 2 injections, it was discussed with the patient that they may return for the subsequent injections if and when their pain returns.  The risks were discussed with the patient including failure of relief, worsening pain, Headache (post dural puncture headache), bleeding (epidural hematoma) and infection (epidural abscess or skin infection).  2.  Physical therapy exercises at home as prescribed by physical therapy or from the pain clinic handout (given to the patient).  Continuation of these exercises every day, or multiple times per week, even when the patient has good pain relief, was stressed to the patient as a preventative measure to decrease the frequency and severity of future pain episodes.  3.  Continue pain medicines as already prescribed.  If patient not currently taking any, it is recommended to begin Acetaminophen 1000 mg po q 8 hours.  If other medicines containing Acetaminophen are currently prescribed, maintain daily dose at 3000 mg.    4.  If they can tolerate NSAIDS, it is recommended to take Ibuprofen 600 mg po q 6 hours for 7 days during pain exacerbations.  Alternatively, they may substitute an NSAID of their choice (e.g. Aleve).  This may be taken at the same time as Acetaminophen.  5.  Heat and ice to the affected area as tolerated for pain control.  It was discussed that heating pads can cause burns.  6.  Daily low impact exercise such as walking or water exercise was recommended to maintain overall health and aid in weight control.   7.  Follow up as needed for subsequent injections.  8.  Patient was counseled to abstain from tobacco products.    [facility] : L 4-5    Time :  23    min

## 2024-08-05 NOTE — ANESTHESIA PROCEDURE NOTES
PAIN Epidural block    Pre-sedation assessment completed: 8/5/2024 11:57 AM    Patient reassessed immediately prior to procedure    Patient location during procedure: pain clinic  Start Time: 8/5/2024 11:57 AM  Stop Time: 8/5/2024 12:10 PM  Indication:at surgeon's request and procedure for pain  Performed By  Anesthesiologist: Brian Garcia MD  Preanesthetic Checklist  Completed: patient identified, IV checked, site marked, risks and benefits discussed, surgical consent, monitors and equipment checked, pre-op evaluation and timeout performed  Additional Notes  Dx:  Post-Op Diagnosis Codes:     * Spinal stenosis of lumbar region with neurogenic claudication [M48.062]     * 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]            Plan : return to clinic as needed      Sedation start:                                                  End:  Prep:  Pt Position:prone (prone)  Sterile Tech:cap, gloves, mask and sterile barrier  Prep:chlorhexidine gluconate and isopropyl alcohol  Monitoring:blood pressure monitoring, EKG and continuous pulse oximetry  Procedure:Sedation: no     Approach:midline  Guidance: fluoroscopy and c arm pa and lat and loss of resistance  Location:lumbar  Level:4-5 (interlaminar)  Needle Type:Serious Parodymelchor  Needle Gauge:20  Aspiration:negative  Medications:  Preservative Free Saline:3mL  Isovue:1mL  Comments:80 mg depomedrol in epidDepomedrol:80 mg  Post Assessment:  Pt Tolerance:patient tolerated the procedure well with no apparent complications  Complications:no

## 2024-08-05 NOTE — DISCHARGE INSTRUCTIONS

## 2024-08-07 DIAGNOSIS — E11.65 TYPE 2 DIABETES MELLITUS WITH HYPERGLYCEMIA, WITH LONG-TERM CURRENT USE OF INSULIN: ICD-10-CM

## 2024-08-07 DIAGNOSIS — Z79.4 TYPE 2 DIABETES MELLITUS WITH HYPERGLYCEMIA, WITH LONG-TERM CURRENT USE OF INSULIN: ICD-10-CM

## 2024-08-07 PROBLEM — M51.36 DEGENERATIVE DISC DISEASE, LUMBAR: Status: ACTIVE | Noted: 2024-08-07

## 2024-08-07 PROBLEM — M17.0 BILATERAL PRIMARY OSTEOARTHRITIS OF KNEE: Status: ACTIVE | Noted: 2024-04-26

## 2024-08-07 PROBLEM — K21.9 GASTRO-ESOPHAGEAL REFLUX DISEASE WITHOUT ESOPHAGITIS: Status: ACTIVE | Noted: 2024-01-01

## 2024-08-07 PROBLEM — M54.50 LOW BACK PAIN, UNSPECIFIED: Status: ACTIVE | Noted: 2024-04-26

## 2024-08-07 PROBLEM — I10 ESSENTIAL (PRIMARY) HYPERTENSION: Status: ACTIVE | Noted: 2024-01-01

## 2024-08-07 PROBLEM — M51.369 DEGENERATIVE DISC DISEASE, LUMBAR: Status: ACTIVE | Noted: 2024-08-07

## 2024-08-07 PROBLEM — G89.29 OTHER CHRONIC PAIN: Status: ACTIVE | Noted: 2024-04-26

## 2024-08-07 PROBLEM — I25.10 ATHSCL HEART DISEASE OF NATIVE CORONARY ARTERY W/O ANG PCTRS: Status: ACTIVE | Noted: 2024-01-01

## 2024-08-07 RX ORDER — GEMFIBROZIL 600 MG/1
600 TABLET, FILM COATED ORAL 2 TIMES DAILY
Qty: 60 TABLET | Refills: 0 | Status: SHIPPED | OUTPATIENT
Start: 2024-08-07

## 2024-08-09 ENCOUNTER — TELEPHONE (OUTPATIENT)
Dept: INTERNAL MEDICINE | Facility: CLINIC | Age: 74
End: 2024-08-09
Payer: MEDICARE

## 2024-08-09 ENCOUNTER — SPECIALTY PHARMACY (OUTPATIENT)
Dept: ENDOCRINOLOGY | Age: 74
End: 2024-08-09
Payer: MEDICARE

## 2024-08-09 NOTE — PROGRESS NOTES
Specialty Pharmacy Refill Coordination Note     Brian is a 74 y.o. male contacted today regarding refills of  7 specialty medication(s).    Reviewed and verified with patient:       Specialty medication(s) and dose(s) confirmed: yes    Refill Questions      Flowsheet Row Most Recent Value   Changes to allergies? No   Changes to medications? No   New conditions or infections since last clinic visit No   Unplanned office visit, urgent care, ED, or hospital admission in the last 4 weeks  No   How does patient/caregiver feel medication is working? Good   Financial problems or insurance changes  No   Since the previous refill, were any specialty medication doses or scheduled injections missed or delayed?  No   Does this patient require a clinical escalation to a pharmacist? No            Delivery Questions      Flowsheet Row Most Recent Value   Delivery method Beeline   Delivery address verified with patient/caregiver? Yes   Delivery address Home   Number of medications in delivery 6   Medication(s) being filled and delivered Insulin Glargine, Insulin Lispro, Duloxetine Hcl (Serotonin-Norepinephrine Reuptake Inhibitors (Snris)), Metformin Hcl (Biguanides), Gemfibrozil, Semaglutide, Insulin Pen Needle   Doses left of specialty medications 1 week   Copay verified? Yes   Copay amount $164   Copay form of payment Credit/debit on file   Ship Date 08/12   Delivery Date 08/13   Signature Required No                   Follow-up: 25 day(s)     Virginia Ontiveros, Pharmacy Technician  Specialty Pharmacy Technician

## 2024-08-09 NOTE — TELEPHONE ENCOUNTER
[facility] just wanted to let Dr. Bear know that they will be discharging pt from [facility] today, 08/09/2024.

## 2024-08-22 ENCOUNTER — TELEPHONE (OUTPATIENT)
Dept: PAIN MEDICINE | Facility: HOSPITAL | Age: 74
End: 2024-08-22
Payer: MEDICARE

## 2024-08-22 DIAGNOSIS — M51.36 DEGENERATIVE DISC DISEASE, LUMBAR: ICD-10-CM

## 2024-08-22 DIAGNOSIS — G89.29 OTHER CHRONIC PAIN: Primary | ICD-10-CM

## 2024-08-22 RX ORDER — INSULIN LISPRO 100 [IU]/ML
25 INJECTION, SOLUTION INTRAVENOUS; SUBCUTANEOUS
Qty: 15 ML | Refills: 0 | Status: SHIPPED | OUTPATIENT
Start: 2024-08-22

## 2024-08-22 NOTE — TELEPHONE ENCOUNTER
Rx Refill Note  Requested Prescriptions     Pending Prescriptions Disp Refills    Insulin Lispro, 1 Unit Dial, (HumaLOG KwikPen) 100 UNIT/ML solution pen-injector 15 mL 0     Sig: Inject 20 Units under the skin 3 Times a Day Before Meals. Use Sliding Scale as Needed. Max 75 units daily      Last office visit with prescribing clinician: 6/28/2024   Last telemedicine visit with prescribing clinician: Visit date not found   Next office visit with prescribing clinician: 10/30/2024                         Would you like a call back once the refill request has been completed: [] Yes [] No    If the office needs to give you a call back, can they leave a voicemail: [] Yes [] No    Antionette Pierre MA  08/22/24, 10:11 EDT

## 2024-08-22 NOTE — TELEPHONE ENCOUNTER
..At it's best, what percentage of pain relief did you receive after your last treatment? (0% to 100%)  0 RELIEF    What would you rate your pain on a 0-10 scale?  Prior to your last procedure:  9-10  For the first 2-3 weeks following your last procedure:  9  Currently: 10    Was your pain improved by at least 50% for 3 months after your last procedure (yes/no):  NO     Describe the location of your pain and if it radiates:    RADIATES DOWN BOTH LEGS    Has your function improved?  Tell us how.    (Can sit/stand longer or walk further?  Complete daily activities more easily?  Have been able to go back to work?)      Are there things you would like or need to do that you can't because of your pain?  YES    Have you tried/continued to try any other treatments? (Chiropractor, PT, massage, yoga, back exercises, heat/ice)  NO PT   UNABLE TO HARDLY WALK      What medications are you currently taking to help with your pain and how often?    OTC      Is there anything else you'd like to tell us that has improved since your last procedure?  TRYING A 2 WEEK FOLLOW UP INJECTION. NO PRIOR INJECTIONS

## 2024-08-23 ENCOUNTER — TELEPHONE (OUTPATIENT)
Dept: INTERNAL MEDICINE | Facility: CLINIC | Age: 74
End: 2024-08-23
Payer: MEDICARE

## 2024-08-23 NOTE — TELEPHONE ENCOUNTER
Scheduled 08/27/2024 at 11:45 am for back pain. Patient is aware of appointment information.    Linda Thurston    ----- Message from [facility] [employer] sent at 8/23/2024  5:23 PM EDT -----  Regarding: Appointment Request  Contact: [phone]  Appointment Request From: Brian Christensen    With Provider: Ha Bear [[facility] PRIMARY [facility]]    Preferred Date Range: 8/26/2024 - 8/27/2024    Preferred Times: Any Time    Reason for visit: In-Office Procedure    Comments:  Pain

## 2024-08-27 ENCOUNTER — SPECIALTY PHARMACY (OUTPATIENT)
Dept: ENDOCRINOLOGY | Age: 74
End: 2024-08-27
Payer: MEDICARE

## 2024-08-27 NOTE — PROGRESS NOTES
Specialty Pharmacy Refill Coordination Note     Brian is a 74 y.o. male contacted today regarding refills of  1 specialty medication(s).    Reviewed and verified with patient:       Specialty medication(s) and dose(s) confirmed: yes    Refill Questions      Flowsheet Row Most Recent Value   Changes to allergies? No   Changes to medications? No   New conditions or infections since last clinic visit No   Unplanned office visit, urgent care, ED, or hospital admission in the last 4 weeks  No   How does patient/caregiver feel medication is working? Good   Financial problems or insurance changes  No   Since the previous refill, were any specialty medication doses or scheduled injections missed or delayed?  No   Does this patient require a clinical escalation to a pharmacist? No            Delivery Questions      Flowsheet Row Most Recent Value   Delivery method UPS   Delivery address verified with patient/caregiver? Yes   Delivery address Home   Number of medications in delivery 1   Medication(s) being filled and delivered Insulin Glargine   Doses left of specialty medications 1 week   Copay verified? Yes   Copay amount $35   Copay form of payment Credit/debit on file   Ship Date 08/28   Delivery Date 08/29   Signature Required No                   Follow-up: 25 day(s)     Virginia Ontiveros, Pharmacy Technician  Specialty Pharmacy Technician

## 2024-08-29 ENCOUNTER — OFFICE VISIT (OUTPATIENT)
Dept: INTERNAL MEDICINE | Facility: CLINIC | Age: 74
End: 2024-08-29
Payer: MEDICARE

## 2024-08-29 VITALS
HEART RATE: 114 BPM | SYSTOLIC BLOOD PRESSURE: 142 MMHG | BODY MASS INDEX: 37 KG/M2 | WEIGHT: 249.8 LBS | DIASTOLIC BLOOD PRESSURE: 86 MMHG | HEIGHT: 69 IN | OXYGEN SATURATION: 96 %
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
## 2024-08-29 DIAGNOSIS — M48.061 SPINAL STENOSIS OF LUMBAR REGION WITHOUT NEUROGENIC CLAUDICATION: ICD-10-CM

## 2024-08-29 DIAGNOSIS — M51.36 DEGENERATIVE DISC DISEASE, LUMBAR: Primary | ICD-10-CM

## 2024-08-29 PROCEDURE — 1125F AMNT PAIN NOTED PAIN PRSNT: CPT | Performed by: STUDENT IN AN ORGANIZED HEALTH CARE EDUCATION/TRAINING PROGRAM

## 2024-08-29 PROCEDURE — 3046F HEMOGLOBIN A1C LEVEL >9.0%: CPT | Performed by: STUDENT IN AN ORGANIZED HEALTH CARE EDUCATION/TRAINING PROGRAM

## 2024-08-29 PROCEDURE — 3079F DIAST BP 80-89 MM HG: CPT | Performed by: STUDENT IN AN ORGANIZED HEALTH CARE EDUCATION/TRAINING PROGRAM

## 2024-08-29 PROCEDURE — 99214 OFFICE O/P EST MOD 30 MIN: CPT | Performed by: STUDENT IN AN ORGANIZED HEALTH CARE EDUCATION/TRAINING PROGRAM

## 2024-08-29 PROCEDURE — 3077F SYST BP >= 140 MM HG: CPT | Performed by: STUDENT IN AN ORGANIZED HEALTH CARE EDUCATION/TRAINING PROGRAM

## 2024-08-29 RX ORDER — LIDOCAINE 50 MG/G
1 PATCH TOPICAL EVERY 24 HOURS
Qty: 30 PATCH | Refills: 0 | Status: CANCELLED | OUTPATIENT
Start: 2024-08-29

## 2024-08-29 RX ORDER — TRAMADOL HYDROCHLORIDE 50 MG/1
50 TABLET ORAL EVERY 6 HOURS PRN
Qty: 30 TABLET | Refills: 0 | Status: SHIPPED | OUTPATIENT
Start: 2024-08-29

## 2024-08-29 RX ORDER — SENNOSIDES 8.6 MG
650 CAPSULE ORAL EVERY 8 HOURS
Qty: 90 TABLET | Refills: 0 | Status: CANCELLED | OUTPATIENT
Start: 2024-08-29

## 2024-08-29 RX ORDER — MUPIROCIN 20 MG/G
1 OINTMENT TOPICAL 3 TIMES DAILY
Qty: 22 G | Refills: 0 | Status: SHIPPED | OUTPATIENT
Start: 2024-08-29

## 2024-08-29 RX ORDER — IBUPROFEN 800 MG/1
800 TABLET, FILM COATED ORAL EVERY 6 HOURS PRN
Qty: 90 TABLET | Refills: 0 | Status: CANCELLED | OUTPATIENT
Start: 2024-08-29

## 2024-08-29 NOTE — PROGRESS NOTES
Ha Bear M.D.  Internal Medicine  [facility]  [address], [address]  [place] [address]  [phone]      Chief Complaint  Back Pain    SUBJECTIVE    History of Present Illness    Brian Christensen is a 74 y.o. male with hypertension, hyperlipidemia, diabetes, degenerative disc disease, knee osteoarthritis who presents to the office today as an established patient that last saw me on 7/3/2024.     MRI this year showed multilevel degenerative disease involving the lumbar spine  including bilateral L5 pars defects, grade 1 retrolisthesis of L2 upon L3 and grade 1 anterolisthesis of L4 upon L5. There is moderate foraminal stenosis to the right at L2-L3 secondary to loss of disc height and extension of disc material into the neural foramen. This approaches but does not definitively involve the right L2 nerve as it exits the neural foramen.  There is moderate to severe if not severe canal stenosis at L4-L5 and severe lateral recess narrowing bilaterally at L4-L5 secondary to a broad-based disc osteophyte complex and posterior element degenerative disease predominantly. There is no evidence of a focal herniation.    He had epidural August 5. Had improvement initially. Pain is in low lynette.  He feels stabbing. Pain is central and at low back.     Rates pain 9/10. Daughter states he cries when he walks.     Taking tylenol and beladryl. 2 extra stretght tylenol and CBD gummies.       He was started on duloxetine for chronic pain and depression    Physical therapy evaluated patient and apparently there was nothing that could be done.    Also on Duloxetine.     No weakness/numbenss. No bowel bladder incontinence.     Stopped tums due to hypercalcemia    Review of Systems    No Known Allergies     Outpatient Medications Marked as Taking for the 8/29/24 encounter (Office Visit) with Ha Bear MD   Medication Sig Dispense Refill    albuterol sulfate  (90 Base) MCG/ACT inhaler Inhale 1 puff  Every 4 (Four) Hours As Needed for Wheezing. 8.5 g 3    Blood Glucose Monitoring Suppl (Blood Glucose Monitor System) w/Device kit Use to check finger stick blood sugar _ times per day. 1 each 0    Blood Glucose Monitoring Suppl (Blood Glucose Monitor System) w/Device kit Use as directed to check blood sugar 1 each 0    Comfort EZ Pen Needles 31G X 5 MM misc USE 3 TIMES DAILY WITH INSULIN PEN      Continuous Blood Gluc  (FreeStyle Ada 14 Day Meno) device Use 1 Device Every 14 (Fourteen) Days. 6 each 0    Continuous Blood Gluc Sensor (FreeStyle Ada 2 Sensor) misc Use 1 each Every 14 (Fourteen) Days. 2 each 0    Continuous Blood Gluc Sensor (FreeStyle Ada 2 Sensor) misc use for 14 days then replace sensor. 2 each 0    Continuous Blood Gluc Sensor (FreeStyle Ada Sensor System) Use Every 10 (Ten) Days. 4 each 5    Continuous Glucose Sensor (FreeStyle Ada 14 Day Sensor) misc use and replace every 2 weeks as directed. 4 each 4    DULoxetine HCl 40 MG capsule delayed-release particles Take 1 capsule by mouth Daily. 90 capsule 2    gemfibrozil (LOPID) 600 MG tablet Take 1 tablet by mouth 2 (Two) Times a Day. 60 tablet 0    Glucose Blood (Blood Glucose Test) strip Use with blood glucose meter to check blood sugar 5 times per day. 100 each 2    Insulin Glargine (LANTUS SOLOSTAR) 100 UNIT/ML injection pen Inject 60 Units under the skin Daily. If fasting blood sugar above 140 on 2 consecutive days can increase the dose by 2 units.  Max daily dose 80 units 30 mL 3    Insulin Lispro, 1 Unit Dial, (HumaLOG KwikPen) 100 UNIT/ML solution pen-injector Inject 20 Units under the skin 3 Times a Day Before Meals. Use Sliding Scale as Needed. Max 75 units daily 15 mL 0    Insulin Pen Needle (Pen Needles) 32G X 4 MM misc Use 1 each 2 (Two) Times a Day. 100 each 5    Insulin Pen Needle 31G X 5 MM misc use 3 times daily with insulin pen. 100 each 4    Insulin Syringes, Disposable, U-100 0.5 ML misc Use needle 5 times  daily for injection into the skin of the abdomen or upper outside of the thighs.   Do not reuse needles. 100 each 2    Lancets Thin misc Use with lancet device to check blood glucose 5 times per day via finger stick. 100 each 1    lisinopril-hydrochlorothiazide (PRINZIDE,ZESTORETIC) 20-25 MG per tablet Take 1 tablet by mouth Daily. 90 tablet 1    meloxicam (Mobic) 7.5 MG tablet Take 1 tablet by mouth Daily. 30 tablet 0    metFORMIN (GLUCOPHAGE) 1000 MG tablet Take 1 tablet by mouth 2 (Two) Times a Day With Meals. 240 tablet 0    miconazole (MICOTIN) 2 % cream Apply 1 Application topically to the appropriate area as directed 2 (Two) Times a Day. Skin fold      pioglitazone (ACTOS) 30 MG tablet Take 1 tablet by mouth Daily. 90 tablet 2    rosuvastatin (Crestor) 10 MG tablet Take 1 tablet by mouth Every Night. 90 tablet 3    Semaglutide,0.25 or 0.5MG/DOS, (Ozempic, 0.25 or 0.5 MG/DOSE,) 2 MG/3ML solution pen-injector Inject 0.25 mg under the skin into the appropriate area as directed 1 (One) Time Per Week. Start at 0.25 mg weekly for 4 weeks, then increased to 0.5 mg weekly (Patient taking differently: Inject 0.5 mg under the skin into the appropriate area as directed 1 (One) Time Per Week. Start at 0.25 mg weekly for 4 weeks, then increased to 0.5 mg weekly) 3 mL 1        Past Medical History:   Diagnosis Date    Arthritis     Carpal boss, left     Coronary artery disease     Diabetes mellitus     Heartburn     Hyperlipidemia     Hypertension     Leg fracture, right      Past Surgical History:   Procedure Laterality Date    COLONOSCOPY      FRACTURE SURGERY      TONSILLECTOMY       Family History   Problem Relation Age of Onset    Prostate cancer Father     reports that he quit smoking about 41 years ago. His smoking use included cigarettes. He started smoking about 61 years ago. He has a 20 pack-year smoking history. He has never used smokeless tobacco. He reports that he does not currently use alcohol. He reports  "that he does not currently use drugs.    OBJECTIVE    Vital Signs:   /86   Pulse 114   Ht 175.3 cm (69.02\")   Wt 113 kg (249 lb 12.8 oz)   SpO2 96%   BMI 36.87 kg/m²     Physical Exam  Constitutional:       Appearance: Normal appearance. He is normal weight.   Cardiovascular:      Rate and Rhythm: Normal rate and regular rhythm.      Heart sounds: Normal heart sounds. No murmur heard.  Pulmonary:      Effort: Pulmonary effort is normal.      Breath sounds: Normal breath sounds.   Musculoskeletal:      Thoracic back: No bony tenderness. Decreased range of motion.      Lumbar back: Tenderness present. No bony tenderness. Decreased range of motion.      Comments: Walks with walker   Skin:     General: Skin is warm and dry.      Comments: Fine scratches on forearm   Neurological:      Mental Status: He is alert.   Psychiatric:         Mood and Affect: Mood normal.         Behavior: Behavior normal.         Thought Content: Thought content normal.     Patient is tearful today    The following data was reviewed by: Ha Bear MD on 08/29/2024:  CMP          1/9/2024    14:08 3/22/2024    11:13 6/21/2024    08:12   CMP   Glucose 406  92  67    BUN 15  16  30    Creatinine 0.98  1.05  1.22    EGFR  75.4     Sodium 135  137  140    Potassium 4.7  4.0  4.7    Chloride 95  102  101    Calcium 9.7  9.9  10.9    Total Protein 7.3   7.3    Total Protein  7.1     Albumin 4.3  4.0  4.4    Globulin 3.0   2.9    Globulin  3.1     Total Bilirubin 0.5  0.3  0.8    Alkaline Phosphatase 66  63  51    AST (SGOT) 28  19  17    ALT (SGPT) 31  24  24    Albumin/Globulin Ratio  1.3     BUN/Creatinine Ratio 15  15.2  24.6    Anion Gap  9.8       CBC w/diff          12/2/2023    06:15 12/3/2023    06:09 1/9/2024    14:08   CBC w/Diff   WBC 5.94  6.53  8.6    RBC 4.59  4.57  4.99    Hemoglobin 13.1  13.3  14.5    Hematocrit 40.3  40.1  43.8    MCV 87.8  87.7  88    MCH 28.5  29.1  29.1    MCHC 32.5  33.2  33.1    RDW 13.1  13.2  13.0 "    Platelets 311  310  377    Neutrophil Rel %  53.9  65    Immature Granulocyte Rel %  0.3     Lymphocyte Rel %  33.7  23    Monocyte Rel %  9.0  7    Eosinophil Rel %  2.5  3    Basophil Rel %  0.6  1      Lipid Panel          1/9/2024    14:08 3/22/2024    11:13 6/21/2024    08:12   Lipid Panel   Total Cholesterol  128     Total Cholesterol 140   197    Triglycerides 326  346  332    HDL Cholesterol 31  31  32    VLDL Cholesterol 51  52  57    LDL Cholesterol  58  45  108    LDL/HDL Ratio  0.90       A1C Last 3 Results          12/7/2023    13:53 3/22/2024    11:13 6/21/2024    08:12   HGBA1C Last 3 Results   Hemoglobin A1C 11.70  11.50  11.30      Data reviewed : Recent pain management note          MRI Lumbar Spine Without Contrast (06/01/2024 16:34)     ASSESSMENT & PLAN     Diagnoses and all orders for this visit:    1. Degenerative disc disease, lumbar (Primary)  -     Urine Drug Screen - Urine, Clean Catch  -     traMADol (ULTRAM) 50 MG tablet; Take 1 tablet by mouth Every 6 (Six) Hours As Needed for Severe Pain.  Dispense: 30 tablet; Refill: 0    2. Spinal stenosis of lumbar region without neurogenic claudication  -     Urine Drug Screen - Urine, Clean Catch  -     traMADol (ULTRAM) 50 MG tablet; Take 1 tablet by mouth Every 6 (Six) Hours As Needed for Severe Pain.  Dispense: 30 tablet; Refill: 0    3. Cat scratch  -     mupirocin (BACTROBAN) 2 % ointment; Apply 1 Application topically to the appropriate area as directed 3 (Three) Times a Day.  Dispense: 30 g; Refill: 0      Continue Tylenol up to 3 g/day.  I think he would benefit from PT however PT evaluated and did not think he would benefit  Continue to follow with pain management for epidural  I do not think he should be on NSAIDs long-term given history of hospitalization for hypotension and RAUL  He is not responding well to duloxetine as far as pain goes  Given intractable nature of his pain I think that he could benefit from low-dose of tramadol.   Discussed increased risk of falls with opioid pain medicines.    As part of the patient's treatment plan, I am prescribing controlled substances. The patient has been made aware of appropriate use of such medications. It has also been made clear that these medications are for use by this patient only, without concomitant use of alcohol or other substances unless prescribed.      Patient has completed prescribing agreement detailing terms of continued prescribing of controlled substances, including monitoring EDER reports, urine drug screening, and pill counts if necessary. The patient is aware that inappropriate use will results in cessation of prescribing such medications.     As the clinician, I personally reviewed the EDER from 08/29/2024 while the patient was in the office today.             Health Maintenance Due   Topic Date Due    COLORECTAL CANCER SCREENING  Never done    TDAP/TD VACCINES (1 - Tdap) Never done    ZOSTER VACCINE (1 of 2) Never done    AAA SCREEN (ONE-TIME)  Never done    COVID-19 Vaccine (1 - 2023-24 season) Never done    INFLUENZA VACCINE  08/01/2024        Follow Up  No follow-ups on file.    Patient/family had no further questions at this time and verbalized understanding of the plan discussed today.

## 2024-08-30 ENCOUNTER — SPECIALTY PHARMACY (OUTPATIENT)
Dept: ENDOCRINOLOGY | Age: 74
End: 2024-08-30
Payer: MEDICARE

## 2024-08-30 DIAGNOSIS — E11.65 TYPE 2 DIABETES MELLITUS WITH HYPERGLYCEMIA, WITH LONG-TERM CURRENT USE OF INSULIN: ICD-10-CM

## 2024-08-30 DIAGNOSIS — Z79.4 TYPE 2 DIABETES MELLITUS WITH HYPERGLYCEMIA, WITH LONG-TERM CURRENT USE OF INSULIN: ICD-10-CM

## 2024-08-30 RX ORDER — SEMAGLUTIDE 0.68 MG/ML
0.25 INJECTION, SOLUTION SUBCUTANEOUS WEEKLY
Qty: 3 ML | Refills: 0 | Status: SHIPPED | OUTPATIENT
Start: 2024-08-30

## 2024-08-30 NOTE — TELEPHONE ENCOUNTER
Rx Refill Note  Requested Prescriptions     Pending Prescriptions Disp Refills    Semaglutide,0.25 or 0.5MG/DOS, (Ozempic, 0.25 or 0.5 MG/DOSE,) 2 MG/3ML solution pen-injector 3 mL 1     Sig: Inject 0.25 mg under the skin into the appropriate area as directed 1 (One) Time Per Week. Start at 0.25 mg weekly for 4 weeks, then increased to 0.5 mg weekly      Last office visit with prescribing clinician: 6/28/2024   Last telemedicine visit with prescribing clinician: Visit date not found   Next office visit with prescribing clinician: 10/30/2024                         Would you like a call back once the refill request has been completed: [] Yes [] No    If the office needs to give you a call back, can they leave a voicemail: [] Yes [] No    Lilia Mcghee MA  08/30/24, 09:32 EDT

## 2024-08-30 NOTE — PROGRESS NOTES
Specialty Pharmacy Refill Coordination Note     Brian is a 74 y.o. male contacted today regarding refills of  1 specialty medication(s).    Reviewed and verified with patient:       Specialty medication(s) and dose(s) confirmed: yes    Refill Questions      Flowsheet Row Most Recent Value   Changes to allergies? No   Changes to medications? No   New conditions or infections since last clinic visit No   Unplanned office visit, urgent care, ED, or hospital admission in the last 4 weeks  No   How does patient/caregiver feel medication is working? Good   Financial problems or insurance changes  No   Since the previous refill, were any specialty medication doses or scheduled injections missed or delayed?  No   Does this patient require a clinical escalation to a pharmacist? No            Delivery Questions      Flowsheet Row Most Recent Value   Delivery method UPS   Delivery address verified with patient/caregiver? Yes   Delivery address Home   Number of medications in delivery 1   Medication(s) being filled and delivered Semaglutide   Doses left of specialty medications 1 week   Copay verified? Yes   Copay amount 125.91   Copay form of payment Credit/debit on file   Ship Date 09/03   Delivery Date 09/04   Signature Required No                   Follow-up: 25 day(s)     Virginia Ontiveros, Pharmacy Technician  Specialty Pharmacy Technician

## 2024-09-03 ENCOUNTER — HOSPITAL ENCOUNTER (OUTPATIENT)
Dept: GENERAL RADIOLOGY | Facility: HOSPITAL | Age: 74
Discharge: HOME | End: 2024-09-03
Payer: MEDICARE

## 2024-09-03 ENCOUNTER — ANESTHESIA (OUTPATIENT)
Dept: PAIN MEDICINE | Facility: HOSPITAL | Age: 74
End: 2024-09-03
Payer: MEDICARE

## 2024-09-03 ENCOUNTER — ANESTHESIA EVENT (OUTPATIENT)
Dept: PAIN MEDICINE | Facility: HOSPITAL | Age: 74
End: 2024-09-03
Payer: MEDICARE

## 2024-09-03 ENCOUNTER — HOSPITAL ENCOUNTER (OUTPATIENT)
Dept: PAIN MEDICINE | Facility: HOSPITAL | Age: 74
Discharge: HOME | End: 2024-09-03
Payer: MEDICARE

## 2024-09-03 DIAGNOSIS — M48.062 SPINAL STENOSIS OF LUMBAR REGION WITH NEUROGENIC CLAUDICATION: ICD-10-CM

## 2024-09-03 DIAGNOSIS — M54.50 LUMBAR PAIN: ICD-10-CM

## 2024-09-03 LAB — GLUCOSE BLDC GLUCOMTR-MCNC: 350 MG/DL (ref 70–130)

## 2024-09-03 PROCEDURE — 82948 REAGENT STRIP/BLOOD GLUCOSE: CPT

## 2024-09-03 RX ORDER — LIDOCAINE HYDROCHLORIDE 10 MG/ML
1 INJECTION, SOLUTION INFILTRATION; PERINEURAL ONCE
Status: DISCONTINUED | OUTPATIENT
Start: 2024-09-03 | End: 2024-09-04 | Stop reason: HOSPADM

## 2024-09-03 RX ORDER — METHYLPREDNISOLONE ACETATE 80 MG/ML
80 INJECTION, SUSPENSION INTRA-ARTICULAR; INTRALESIONAL; INTRAMUSCULAR; SOFT TISSUE ONCE
Status: DISCONTINUED | OUTPATIENT
Start: 2024-09-03 | End: 2024-09-04 | Stop reason: HOSPADM

## 2024-09-03 RX ORDER — MIDAZOLAM HYDROCHLORIDE 1 MG/ML
1 INJECTION INTRAMUSCULAR; INTRAVENOUS ONCE AS NEEDED
Status: DISCONTINUED | OUTPATIENT
Start: 2024-09-03 | End: 2024-09-04 | Stop reason: HOSPADM

## 2024-09-03 RX ORDER — FENTANYL CITRATE 50 UG/ML
50 INJECTION, SOLUTION INTRAMUSCULAR; INTRAVENOUS ONCE
Status: DISCONTINUED | OUTPATIENT
Start: 2024-09-03 | End: 2024-09-04 | Stop reason: HOSPADM

## 2024-09-03 RX ORDER — IOPAMIDOL 408 MG/ML
10 INJECTION, SOLUTION INTRATHECAL
Status: DISCONTINUED | OUTPATIENT
Start: 2024-09-03 | End: 2024-09-04 | Stop reason: HOSPADM

## 2024-09-03 NOTE — NURSING NOTE
No treatment today as pt bs is 350. He has a wound r great toe with dried blood. Note feet bottoms are black with dirt. O2 sats are 88%. I reported this to Dr Will.   Opt out

## 2024-09-03 NOTE — DISCHARGE INSTRUCTIONS

## 2024-09-03 NOTE — H&P
INTERVAL HISTORY:    The patient returns for another Lumbar epidural steroid injection today.  He has had 1 previous injection and only got about 10% relief.  His pain is up to a 10 out of 10.  He had a physical therapist come to the house but they were not able to accomplish much.  He does have a prescription for tramadol.  Unfortunately today is blood sugars 350 and he has a wound on his right first toe that is being treated for infection.  We had a discussion that I think the risk to benefit ratio is too high for a procedure that did not help him before.  I advised him to have his toe looked at again .  Current Outpatient Medications on File Prior to Encounter   Medication Sig Dispense Refill    albuterol sulfate  (90 Base) MCG/ACT inhaler Inhale 1 puff Every 4 (Four) Hours As Needed for Wheezing. 8.5 g 3    Blood Glucose Monitoring Suppl (Blood Glucose Monitor System) w/Device kit Use to check finger stick blood sugar _ times per day. 1 each 0    Blood Glucose Monitoring Suppl (Blood Glucose Monitor System) w/Device kit Use as directed to check blood sugar 1 each 0    Comfort EZ Pen Needles 31G X 5 MM misc USE 3 TIMES DAILY WITH INSULIN PEN      Continuous Blood Gluc  (FreeStyle Ada 14 Day [place]) device Use 1 Device Every 14 (Fourteen) Days. 6 each 0    Continuous Blood Gluc Sensor (FreeStyle Ada 2 Sensor) misc Use 1 each Every 14 (Fourteen) Days. 2 each 0    Continuous Blood Gluc Sensor (FreeStyle Ada 2 Sensor) misc use for 14 days then replace sensor. 2 each 0    Continuous Blood Gluc Sensor (FreeStyle Ada Sensor System) Use Every 10 (Ten) Days. 4 each 5    Continuous Glucose Sensor (FreeStyle Ada 14 Day Sensor) misc use and replace every 2 weeks as directed. 4 each 4    DULoxetine HCl 40 MG capsule delayed-release particles Take 1 capsule by mouth Daily. 90 capsule 2    gemfibrozil (LOPID) 600 MG tablet Take 1 tablet by mouth 2 (Two) Times a Day. 60 tablet 0    Glucose Blood (Blood  Glucose Test) strip Use with blood glucose meter to check blood sugar 5 times per day. 100 each 2    Insulin Glargine (LANTUS SOLOSTAR) 100 UNIT/ML injection pen Inject 60 Units under the skin Daily. If fasting blood sugar above 140 on 2 consecutive days can increase the dose by 2 units.  Max daily dose 80 units 30 mL 3    Insulin Lispro, 1 Unit Dial, (HumaLOG KwikPen) 100 UNIT/ML solution pen-injector Inject 20 Units under the skin 3 Times a Day Before Meals. Use Sliding Scale as Needed. Max 75 units daily 15 mL 0    Insulin Pen Needle (Pen Needles) 32G X 4 MM misc Use 1 each 2 (Two) Times a Day. 100 each 5    Insulin Pen Needle 31G X 5 MM misc use 3 times daily with insulin pen. 100 each 4    Insulin Syringes, Disposable, U-100 0.5 ML misc Use needle 5 times daily for injection into the skin of the abdomen or upper outside of the thighs.   Do not reuse needles. 100 each 2    Lancets Thin misc Use with lancet device to check blood glucose 5 times per day via finger stick. 100 each 1    lisinopril-hydrochlorothiazide (PRINZIDE,ZESTORETIC) 20-25 MG per tablet Take 1 tablet by mouth Daily. 90 tablet 1    meloxicam (Mobic) 7.5 MG tablet Take 1 tablet by mouth Daily. 30 tablet 0    metFORMIN (GLUCOPHAGE) 1000 MG tablet Take 1 tablet by mouth 2 (Two) Times a Day With Meals. 240 tablet 0    miconazole (MICOTIN) 2 % cream Apply 1 Application topically to the appropriate area as directed 2 (Two) Times a Day. Skin fold      mupirocin (BACTROBAN) 2 % ointment Apply 1 Application topically to the appropriate area as directed 3 (Three) Times a Day. 22 g 0    pioglitazone (ACTOS) 30 MG tablet Take 1 tablet by mouth Daily. 90 tablet 2    rosuvastatin (Crestor) 10 MG tablet Take 1 tablet by mouth Every Night. 90 tablet 3    Semaglutide,0.25 or 0.5MG/DOS, (Ozempic, 0.25 or 0.5 MG/DOSE,) 2 MG/3ML solution pen-injector Inject 0.25 mg under the skin into the appropriate area as directed 1 (One) Time Per Week for 4 weeks. THEN increase  to 0.5mg under the skin as directed 1 (One) Time Per Week. 3 mL 0    traMADol (ULTRAM) 50 MG tablet Take 1 tablet by mouth Every 6 (Six) Hours As Needed for Severe Pain. 30 tablet 0     No current facility-administered medications on file prior to encounter.       Past Medical History:   Diagnosis Date    Arthritis     Carpal boss, left     Coronary artery disease     Diabetes mellitus     Heartburn     Hyperlipidemia     Hypertension     Leg fracture, right        No hematologic infectious or constitutional symptoms  Negative screen for LIZZY      Exam:  There were no vitals taken for this visit.  Airway [place] 2  Alert and oriented      Diagnosis:  Post-Op Diagnosis Codes:     * Lumbar radiculopathy [M54.16]    Plan: Medical management and encourage physical therapy in addition to improved diabetic glucose management.

## 2024-09-03 NOTE — ANESTHESIA PROCEDURE NOTES
PAIN Epidural block      Patient reassessed immediately prior to procedure    Performed By  Anesthesiologist: Kirk Will MD  Additional Notes  No procedure was performed.  Patient's glucose is too high and appears to have a possible toe infection

## 2024-09-06 ENCOUNTER — SPECIALTY PHARMACY (OUTPATIENT)
Dept: ENDOCRINOLOGY | Age: 74
End: 2024-09-06
Payer: MEDICARE

## 2024-09-06 RX ORDER — FLASH GLUCOSE SCANNING READER
1 EACH MISCELLANEOUS
Qty: 1 EACH | Refills: 0 | Status: SHIPPED | OUTPATIENT
Start: 2024-09-06

## 2024-09-06 NOTE — TELEPHONE ENCOUNTER
Specialty Pharmacy Patient Management Program  Prescription Refill Request     Patient currently fills medications at  Pharmacy. Needing refill(s) on the following:      Requested Prescriptions     Pending Prescriptions Disp Refills    Continuous Glucose  (FreeStyle Ada 14 Day [place]) device 6 each 0     Sig: Use 1 Device Every 14 (Fourteen) Days.       Last visit: 6/28/24  Next visit: 10/30/24    Pended for Dr. Nokhbehzaeim to review, and approve if appropriate.         Nelida Mojica, PharmD  Clinical Specialty Pharmacist, Endocrinology  9/6/2024  14:03 EDT

## 2024-09-06 NOTE — PROGRESS NOTES
Specialty Pharmacy Refill Coordination Note     Brian is a 74 y.o. male contacted today regarding refills of  1 specialty medication(s).    Reviewed and verified with patient:       Specialty medication(s) and dose(s) confirmed: yes    Refill Questions      Flowsheet Row Most Recent Value   Changes to allergies? No   Changes to medications? No   New conditions or infections since last clinic visit No   Unplanned office visit, urgent care, ED, or hospital admission in the last 4 weeks  No   How does patient/caregiver feel medication is working? Good   Financial problems or insurance changes  No   Since the previous refill, were any specialty medication doses or scheduled injections missed or delayed?  No   Does this patient require a clinical escalation to a pharmacist? No            Delivery Questions      Flowsheet Row Most Recent Value   Delivery method UPS   Delivery address verified with patient/caregiver? Yes   Delivery address Home   Number of medications in delivery 1   Medication(s) being filled and delivered Continuous Glucose Sensor   Doses left of specialty medications 1 week   Copay verified? Yes   Copay amount $0   Copay form of payment No copayment ($0)   Ship Date 09/09   Delivery Date 09/10   Signature Required No                   Follow-up: 25 day(s)     Virginia Ontiveros, Pharmacy Technician  Specialty Pharmacy Technician

## 2024-09-06 NOTE — TELEPHONE ENCOUNTER
Rx Refill Note  Requested Prescriptions     Pending Prescriptions Disp Refills    Continuous Glucose  (FreeStyle Ada 14 Day [place]) device 6 each 0     Sig: Use 1 Device Every 14 (Fourteen) Days.      Last office visit with prescribing clinician: Visit date not found   Last telemedicine visit with prescribing clinician: Visit date not found   Next office visit with prescribing clinician: Visit date not found                         Would you like a call back once the refill request has been completed: [] Yes [] No    If the office needs to give you a call back, can they leave a voicemail: [] Yes [] No    Lucille Mcghee  09/06/24, 14:07 EDT

## 2024-09-09 DIAGNOSIS — M51.36 DDD (DEGENERATIVE DISC DISEASE), LUMBAR: ICD-10-CM

## 2024-09-09 DIAGNOSIS — M51.36 DEGENERATIVE DISC DISEASE, LUMBAR: ICD-10-CM

## 2024-09-09 DIAGNOSIS — M48.061 SPINAL STENOSIS OF LUMBAR REGION WITHOUT NEUROGENIC CLAUDICATION: ICD-10-CM

## 2024-09-09 RX ORDER — MELOXICAM 7.5 MG/1
7.5 TABLET ORAL DAILY
Qty: 30 TABLET | Refills: 0 | Status: SHIPPED | OUTPATIENT
Start: 2024-09-09 | End: 2024-09-13 | Stop reason: SDUPTHER

## 2024-09-09 RX ORDER — TRAMADOL HYDROCHLORIDE 50 MG/1
50 TABLET ORAL EVERY 6 HOURS PRN
Qty: 30 TABLET | Refills: 0 | OUTPATIENT
Start: 2024-09-09

## 2024-09-10 ENCOUNTER — TELEPHONE (OUTPATIENT)
Dept: INTERNAL MEDICINE | Facility: CLINIC | Age: 74
End: 2024-09-10
Payer: MEDICARE

## 2024-09-10 DIAGNOSIS — M51.36 DEGENERATIVE DISC DISEASE, LUMBAR: ICD-10-CM

## 2024-09-10 DIAGNOSIS — M48.061 SPINAL STENOSIS OF LUMBAR REGION WITHOUT NEUROGENIC CLAUDICATION: ICD-10-CM

## 2024-09-10 RX ORDER — TRAMADOL HYDROCHLORIDE 50 MG/1
50 TABLET ORAL EVERY 6 HOURS PRN
Qty: 30 TABLET | Refills: 0 | OUTPATIENT
Start: 2024-09-10

## 2024-09-13 DIAGNOSIS — M51.36 DDD (DEGENERATIVE DISC DISEASE), LUMBAR: ICD-10-CM

## 2024-09-13 DIAGNOSIS — M51.36 DEGENERATIVE DISC DISEASE, LUMBAR: ICD-10-CM

## 2024-09-13 DIAGNOSIS — M48.061 SPINAL STENOSIS OF LUMBAR REGION WITHOUT NEUROGENIC CLAUDICATION: ICD-10-CM

## 2024-09-13 RX ORDER — MELOXICAM 7.5 MG/1
7.5 TABLET ORAL DAILY
Qty: 30 TABLET | Refills: 0 | Status: SHIPPED | OUTPATIENT
Start: 2024-09-13

## 2024-09-13 RX ORDER — TRAMADOL HYDROCHLORIDE 50 MG/1
50 TABLET ORAL EVERY 6 HOURS PRN
Qty: 30 TABLET | Refills: 0 | Status: CANCELLED | OUTPATIENT
Start: 2024-09-13

## 2024-09-15 DIAGNOSIS — M48.061 SPINAL STENOSIS OF LUMBAR REGION WITHOUT NEUROGENIC CLAUDICATION: ICD-10-CM

## 2024-09-15 DIAGNOSIS — M51.36 DEGENERATIVE DISC DISEASE, LUMBAR: ICD-10-CM

## 2024-09-15 LAB
AMPHETAMINES UR QL SCN: NEGATIVE NG/ML
BARBITURATES UR QL SCN: NEGATIVE NG/ML
BENZODIAZ UR QL SCN: NEGATIVE NG/ML
BZE UR QL SCN: NEGATIVE NG/ML
CANNABINOIDS UR QL SCN: NEGATIVE NG/ML
CREAT UR-MCNC: 174.3 MG/DL (ref 20–300)
LABORATORY COMMENT REPORT: NORMAL
METHADONE UR QL SCN: NEGATIVE NG/ML
OPIATES UR QL SCN: NEGATIVE NG/ML
OXYCODONE+OXYMORPHONE UR QL SCN: NEGATIVE NG/ML
PCP UR QL: NEGATIVE NG/ML
PH UR: 5.2 [PH] (ref 4.5–8.9)
PROPOXYPH UR QL SCN: NEGATIVE NG/ML

## 2024-09-15 RX ORDER — TRAMADOL HYDROCHLORIDE 50 MG/1
50 TABLET ORAL EVERY 6 HOURS PRN
Qty: 30 TABLET | Refills: 0 | Status: CANCELLED | OUTPATIENT
Start: 2024-09-15

## 2024-09-16 ENCOUNTER — TELEPHONE (OUTPATIENT)
Dept: INTERNAL MEDICINE | Facility: CLINIC | Age: 74
End: 2024-09-16

## 2024-09-16 DIAGNOSIS — M48.061 SPINAL STENOSIS OF LUMBAR REGION WITHOUT NEUROGENIC CLAUDICATION: ICD-10-CM

## 2024-09-16 DIAGNOSIS — M51.36 DEGENERATIVE DISC DISEASE, LUMBAR: ICD-10-CM

## 2024-09-16 RX ORDER — TRAMADOL HYDROCHLORIDE 50 MG/1
50 TABLET ORAL EVERY 12 HOURS PRN
Qty: 60 TABLET | Refills: 0 | Status: SHIPPED | OUTPATIENT
Start: 2024-09-16

## 2024-09-16 NOTE — TELEPHONE ENCOUNTER
Sent patients daughter a my chart message to have them to follow up with pain management and discuss why he can't have more epidurals and what treatment options he has. And to follow up as scheduled with Dr. Bear in December.

## 2024-09-16 NOTE — TELEPHONE ENCOUNTER
Patients daughter Radha would like to know since patient will no longer be getting Epidura's for his back, will you be continuing to prescribe him the Tramadol? Or will they need to do something else? If you are going to continue prescribing the Tramadol does patient need to come in before his December appointment? PLEASE ADVISE

## 2024-09-16 NOTE — TELEPHONE ENCOUNTER
Why is he not planning to follow up with pain management? They may have other procedures that could be helpful.  He said the epidural was helpful. He needs to see me every three months for an in office visit if he is going to continue tramadol for pain.

## 2024-09-16 NOTE — TELEPHONE ENCOUNTER
Caller: ROVERTO SALAMANCA    Relationship: Emergency Contact    Best call back number: [phone]     What orders are you requesting (i.e. lab or imaging): URINALYSIS FOR MEDICATION     In what timeframe would the patient need to come in: WHENEVER THIS IS DUE    Where will you receive your lab/imaging services: IN THE OFFICE    Additional notes:   PLEASE CALL TO SCHEDULE ONCE ORDER HAS BEEN ENTERED.

## 2024-09-17 NOTE — TELEPHONE ENCOUNTER
I see that Dr. Will has recommended medical management due to uncontrolled hyperglycemia and given he is being treated for a diabetic foot wound.

## 2024-09-20 ENCOUNTER — SPECIALTY PHARMACY (OUTPATIENT)
Dept: ENDOCRINOLOGY | Age: 74
End: 2024-09-20
Payer: MEDICARE

## 2024-09-20 RX ORDER — LISINOPRIL AND HYDROCHLOROTHIAZIDE 20; 25 MG/1; MG/1
1 TABLET ORAL DAILY
Qty: 90 TABLET | Refills: 1 | Status: CANCELLED | OUTPATIENT
Start: 2024-09-20

## 2024-10-19 DIAGNOSIS — M48.061 SPINAL STENOSIS OF LUMBAR REGION WITHOUT NEUROGENIC CLAUDICATION: ICD-10-CM

## 2024-10-19 DIAGNOSIS — M51.369 DEGENERATIVE DISC DISEASE, LUMBAR: ICD-10-CM

## 2024-10-19 DIAGNOSIS — E11.65 TYPE 2 DIABETES MELLITUS WITH HYPERGLYCEMIA, WITH LONG-TERM CURRENT USE OF INSULIN: ICD-10-CM

## 2024-10-19 DIAGNOSIS — Z79.4 TYPE 2 DIABETES MELLITUS WITH HYPERGLYCEMIA, WITH LONG-TERM CURRENT USE OF INSULIN: ICD-10-CM

## 2024-10-21 RX ORDER — FLASH GLUCOSE SCANNING READER
1 EACH MISCELLANEOUS
Qty: 1 EACH | Refills: 0 | Status: SHIPPED | OUTPATIENT
Start: 2024-10-21

## 2024-10-21 RX ORDER — SEMAGLUTIDE 0.68 MG/ML
0.25 INJECTION, SOLUTION SUBCUTANEOUS WEEKLY
Qty: 3 ML | Refills: 0 | Status: SHIPPED | OUTPATIENT
Start: 2024-10-21

## 2024-10-21 RX ORDER — DIPHENHYDRAMINE HYDROCHLORIDE 25 MG/1
CAPSULE, LIQUID FILLED ORAL
Qty: 1 EACH | Refills: 0 | Status: SHIPPED | OUTPATIENT
Start: 2024-10-21

## 2024-10-21 RX ORDER — FLASH GLUCOSE SENSOR
KIT MISCELLANEOUS
Qty: 4 EACH | Refills: 5 | Status: SHIPPED | OUTPATIENT
Start: 2024-10-21

## 2024-10-21 NOTE — TELEPHONE ENCOUNTER
Rx Refill Note  Requested Prescriptions     Pending Prescriptions Disp Refills    Continuous Glucose  (FreeStyle Ada 14 Day [place]) device 1 each 0     Sig: Use 1 Device as directed to monitor blood sugar      Last office visit with prescribing clinician: Visit date not found   Last telemedicine visit with prescribing clinician: Visit date not found   Next office visit with prescribing clinician: Visit date not found                         Would you like a call back once the refill request has been completed: [] Yes [] No    If the office needs to give you a call back, can they leave a voicemail: [] Yes [] No    Lilia Mcghee MA  10/21/24, 07:54 EDT

## 2024-10-21 NOTE — TELEPHONE ENCOUNTER
Rx Refill Note  Requested Prescriptions     Pending Prescriptions Disp Refills    Blood Glucose Monitoring Suppl (Blood Glucose Monitor System) w/Device kit 1 each 0     Sig: Use as directed to check blood sugar      Last office visit with prescribing clinician: 6/28/2024   Last telemedicine visit with prescribing clinician: Visit date not found   Next office visit with prescribing clinician: 10/19/2024                         Would you like a call back once the refill request has been completed: [] Yes [] No    If the office needs to give you a call back, can they leave a voicemail: [] Yes [] No    Lucille Mcghee  10/21/24, 08:21 EDT

## 2024-10-21 NOTE — TELEPHONE ENCOUNTER
Rx Refill Note  Requested Prescriptions     Pending Prescriptions Disp Refills    Semaglutide,0.25 or 0.5MG/DOS, (Ozempic, 0.25 or 0.5 MG/DOSE,) 2 MG/3ML solution pen-injector 3 mL 0     Sig: Inject 0.25 mg under the skin into the appropriate area as directed 1 (One) Time Per Week for 4 weeks. THEN increase to 0.5mg under the skin as directed 1 (One) Time Per Week.      Last office visit with prescribing clinician: Visit date not found   Last telemedicine visit with prescribing clinician: Visit date not found   Next office visit with prescribing clinician: Visit date not found                         Would you like a call back once the refill request has been completed: [] Yes [] No    If the office needs to give you a call back, can they leave a voicemail: [] Yes [] No    Lilia Mcghee MA  10/21/24, 07:45 EDT

## 2024-10-21 NOTE — TELEPHONE ENCOUNTER
Rx Refill Note  Requested Prescriptions     Pending Prescriptions Disp Refills    Continuous Glucose Sensor (FreeStyle Ada 2 Sensor) misc 2 each 0     Sig: Use 1 each Every 14 (Fourteen) Days.      Last office visit with prescribing clinician: 6/28/2024   Last telemedicine visit with prescribing clinician: Visit date not found   Next office visit with prescribing clinician: 10/30/2024                         Would you like a call back once the refill request has been completed: [] Yes [] No    If the office needs to give you a call back, can they leave a voicemail: [] Yes [] No    Lucille Mcghee  10/21/24, 08:21 EDT

## 2024-10-22 ENCOUNTER — SPECIALTY PHARMACY (OUTPATIENT)
Dept: ENDOCRINOLOGY | Age: 74
End: 2024-10-22
Payer: MEDICARE

## 2024-10-22 DIAGNOSIS — M51.369 DEGENERATIVE DISC DISEASE, LUMBAR: ICD-10-CM

## 2024-10-22 DIAGNOSIS — M48.061 SPINAL STENOSIS OF LUMBAR REGION WITHOUT NEUROGENIC CLAUDICATION: ICD-10-CM

## 2024-10-22 RX ORDER — GEMFIBROZIL 600 MG/1
600 TABLET, FILM COATED ORAL 2 TIMES DAILY
Qty: 60 TABLET | Refills: 0 | Status: SHIPPED | OUTPATIENT
Start: 2024-10-22

## 2024-10-22 RX ORDER — TRAMADOL HYDROCHLORIDE 50 MG/1
50 TABLET ORAL EVERY 12 HOURS PRN
Qty: 60 TABLET | Refills: 0 | Status: SHIPPED | OUTPATIENT
Start: 2024-10-22

## 2024-10-22 RX ORDER — LISINOPRIL AND HYDROCHLOROTHIAZIDE 20; 25 MG/1; MG/1
1 TABLET ORAL DAILY
Qty: 90 TABLET | Refills: 1 | Status: SHIPPED | OUTPATIENT
Start: 2024-10-22

## 2024-10-22 RX ORDER — TRAMADOL HYDROCHLORIDE 50 MG/1
50 TABLET ORAL EVERY 12 HOURS PRN
Qty: 60 TABLET | Refills: 0 | OUTPATIENT
Start: 2024-10-22

## 2024-10-22 NOTE — PROGRESS NOTES
Specialty Pharmacy Refill Coordination Note     Brian is a 74 y.o. male contacted today regarding refills of  7 specialty medication(s).    Reviewed and verified with patient:       Specialty medication(s) and dose(s) confirmed: yes    Refill Questions      Flowsheet Row Most Recent Value   Changes to allergies? No   Changes to medications? No   New conditions or infections since last clinic visit No   Unplanned office visit, urgent care, ED, or hospital admission in the last 4 weeks  No   How does patient/caregiver feel medication is working? Good   Financial problems or insurance changes  No   Since the previous refill, were any specialty medication doses or scheduled injections missed or delayed?  No   Does this patient require a clinical escalation to a pharmacist? No            Delivery Questions      Flowsheet Row Most Recent Value   Delivery method UPS   Delivery address verified with patient/caregiver? Yes   Delivery address Home   Number of medications in delivery 7   Medication(s) being filled and delivered Insulin Glargine (LANTUS SOLOSTAR), Insulin Lispro (HUMALOG), DULoxetine HCl, metFORMIN HCl (GLUCOPHAGE), Meloxicam (MOBIC), Gemfibrozil (LOPID), Lisinopril-hydroCHLOROthiazide (PRINZIDE,ZESTORETIC)   Doses left of specialty medications 1 week   Copay verified? Yes   Copay amount 74.29   Copay form of payment Credit/debit on file   Ship Date 10/23   Delivery Date 10/24   Signature Required No                   Follow-up: 25 day(s)     Virginia Ontiveros, Pharmacy Technician  Specialty Pharmacy Technician

## 2024-10-23 ENCOUNTER — LAB (OUTPATIENT)
Facility: HOSPITAL | Age: 74
End: 2024-10-23
Payer: MEDICARE

## 2024-10-23 DIAGNOSIS — E78.2 MIXED HYPERLIPIDEMIA: ICD-10-CM

## 2024-10-23 DIAGNOSIS — E83.52 HYPERCALCEMIA: ICD-10-CM

## 2024-10-23 DIAGNOSIS — E11.65 TYPE 2 DIABETES MELLITUS WITH HYPERGLYCEMIA, WITH LONG-TERM CURRENT USE OF INSULIN: ICD-10-CM

## 2024-10-23 DIAGNOSIS — Z79.4 TYPE 2 DIABETES MELLITUS WITH HYPERGLYCEMIA, WITH LONG-TERM CURRENT USE OF INSULIN: ICD-10-CM

## 2024-10-23 LAB
ALBUMIN SERPL-MCNC: 4.1 G/DL (ref 3.5–5.2)
ALBUMIN UR-MCNC: <1.2 MG/DL
ALBUMIN/GLOB SERPL: 1.1 G/DL
ALP SERPL-CCNC: 72 U/L (ref 39–117)
ALT SERPL W P-5'-P-CCNC: 31 U/L (ref 1–41)
ANION GAP SERPL CALCULATED.3IONS-SCNC: 14.1 MMOL/L (ref 5–15)
AST SERPL-CCNC: 20 U/L (ref 1–40)
BILIRUB SERPL-MCNC: 0.6 MG/DL (ref 0–1.2)
BUN SERPL-MCNC: 19 MG/DL (ref 8–23)
BUN/CREAT SERPL: 16.1 (ref 7–25)
CALCIUM SPEC-SCNC: 10 MG/DL (ref 8.6–10.5)
CHLORIDE SERPL-SCNC: 96 MMOL/L (ref 98–107)
CHOLEST SERPL-MCNC: 197 MG/DL (ref 0–200)
CO2 SERPL-SCNC: 22.9 MMOL/L (ref 22–29)
CREAT SERPL-MCNC: 1.18 MG/DL (ref 0.76–1.27)
CREAT UR-MCNC: 52.1 MG/DL
EGFRCR SERPLBLD CKD-EPI 2021: 65.2 ML/MIN/1.73
GLOBULIN UR ELPH-MCNC: 3.6 GM/DL
GLUCOSE SERPL-MCNC: 419 MG/DL (ref 65–99)
HBA1C MFR BLD: 11.8 % (ref 4.8–5.6)
HDLC SERPL-MCNC: 29 MG/DL (ref 40–60)
LDLC SERPL CALC-MCNC: 83 MG/DL (ref 0–100)
LDLC/HDLC SERPL: 2.14 {RATIO}
MICROALBUMIN/CREAT UR: NORMAL MG/G{CREAT}
POTASSIUM SERPL-SCNC: 4.5 MMOL/L (ref 3.5–5.2)
PROT SERPL-MCNC: 7.7 G/DL (ref 6–8.5)
PTH-INTACT SERPL-MCNC: 22.8 PG/ML (ref 15–65)
SODIUM SERPL-SCNC: 133 MMOL/L (ref 136–145)
TRIGL SERPL-MCNC: 529 MG/DL (ref 0–150)
VLDLC SERPL-MCNC: 85 MG/DL (ref 5–40)

## 2024-10-23 PROCEDURE — 82570 ASSAY OF URINE CREATININE: CPT

## 2024-10-23 PROCEDURE — 36415 COLL VENOUS BLD VENIPUNCTURE: CPT

## 2024-10-23 PROCEDURE — 80061 LIPID PANEL: CPT

## 2024-10-23 PROCEDURE — 82043 UR ALBUMIN QUANTITATIVE: CPT

## 2024-10-23 PROCEDURE — 83036 HEMOGLOBIN GLYCOSYLATED A1C: CPT

## 2024-10-23 PROCEDURE — 83970 ASSAY OF PARATHORMONE: CPT

## 2024-10-23 PROCEDURE — 80053 COMPREHEN METABOLIC PANEL: CPT

## 2024-10-30 ENCOUNTER — OFFICE VISIT (OUTPATIENT)
Dept: ENDOCRINOLOGY | Age: 74
End: 2024-10-30
Payer: MEDICARE

## 2024-10-30 ENCOUNTER — SPECIALTY PHARMACY (OUTPATIENT)
Dept: ENDOCRINOLOGY | Age: 74
End: 2024-10-30
Payer: MEDICARE

## 2024-10-30 VITALS
HEART RATE: 60 BPM | DIASTOLIC BLOOD PRESSURE: 62 MMHG | OXYGEN SATURATION: 98 % | BODY MASS INDEX: 38.8 KG/M2 | HEIGHT: 69 IN | SYSTOLIC BLOOD PRESSURE: 114 MMHG | WEIGHT: 262 LBS | TEMPERATURE: 98.2 F

## 2024-10-30 DIAGNOSIS — E11.69 MIXED DIABETIC HYPERLIPIDEMIA ASSOCIATED WITH TYPE 2 DIABETES MELLITUS: ICD-10-CM

## 2024-10-30 DIAGNOSIS — E11.65 TYPE 2 DIABETES MELLITUS WITH HYPERGLYCEMIA, WITH LONG-TERM CURRENT USE OF INSULIN: Primary | ICD-10-CM

## 2024-10-30 DIAGNOSIS — Z79.4 TYPE 2 DIABETES MELLITUS WITH HYPERGLYCEMIA, WITH LONG-TERM CURRENT USE OF INSULIN: Primary | ICD-10-CM

## 2024-10-30 DIAGNOSIS — E83.52 HYPERCALCEMIA: ICD-10-CM

## 2024-10-30 DIAGNOSIS — E78.2 MIXED DIABETIC HYPERLIPIDEMIA ASSOCIATED WITH TYPE 2 DIABETES MELLITUS: ICD-10-CM

## 2024-10-30 NOTE — PROGRESS NOTES
Chief Complaint  Diabetes    Subjective        Brian Christensen presents to [facility] for follow up.       History of Present Illness      Diagnosed with type 2 diabetes about 40 years ago.  Denies diabetic retinopathy, neuropathy and nephropathy.     Insulin use:Yes    Status of disease: Uncontrolled   Last A1c  11.8 % in 10/2024     Current regimen:   Metformin 1000 mg BID  Actos 30 mg daily,   Lantus 50 unts BID  Humalog  40 units before breakfast and 20 units with lunch and dinner   Ozempic 0.5 mg weekly: reports diarrhea      Not using CGM  Does not check FS   Denies personal or family history of pancreatitis or pancreatic cancer.  Denies personal or family history of thyroid cancer.     Has mixed hyperlipidemia Takes Crestor gemfibrozil 600 mg twice daily.   No Hx of CAD             Component      Latest Ref Rng 6/21/2024 10/23/2024   Glucose      65 - 99 mg/dL  419 (HH)    BUN      8 - 23 mg/dL  19    Creatinine      0.76 - 1.27 mg/dL  1.18    Sodium      136 - 145 mmol/L  133 (L)    Potassium      3.5 - 5.2 mmol/L  4.5    Chloride      98 - 107 mmol/L  96 (L)    CO2      22.0 - 29.0 mmol/L  22.9    Calcium      8.6 - 10.5 mg/dL  10.0    Total Protein      6.0 - 8.5 g/dL  7.7    Albumin      3.5 - 5.2 g/dL  4.1    ALT (SGPT)      1 - 41 U/L  31    AST (SGOT)      1 - 40 U/L  20    Alkaline Phosphatase      39 - 117 U/L  72    Total Bilirubin      0.0 - 1.2 mg/dL  0.6    Globulin      gm/dL  3.6    A/G Ratio      g/dL  1.1    BUN/Creatinine Ratio      7.0 - 25.0   16.1    Anion Gap      5.0 - 15.0 mmol/L  14.1    eGFR      >60.0 mL/min/1.73  65.2    Total Cholesterol      0 - 200 mg/dL  197    Triglycerides      0 - 150 mg/dL  529 (H)    HDL Cholesterol      40 - 60 mg/dL  29 (L)    LDL Cholesterol       0 - 100 mg/dL  83    VLDL Cholesterol      5 - 40 mg/dL  85 (H)    LDL/HDL Ratio  2.14    Microalbumin/Creatinine Ratio  --    Creatinine, Urine      mg/dL  52.1    Microalbumin,  "Urine      mg/dL  <1.2    Hemoglobin A1C      4.80 - 5.60 % 11.30 (H)  11.80 (H)    PTH Intact (Serial Monitor)      15.0 - 65.0 pg/mL  22.8       Legend:  (H) High  (HH) High Critical  (L) Low  Objective   Vital Signs:  /62   Pulse 60   Temp 98.2 °F (36.8 °C) (Oral)   Ht 175.3 cm (69.02\")   Wt 119 kg (262 lb)   SpO2 98%   BMI 38.67 kg/m²   Estimated body mass index is 38.67 kg/m² as calculated from the following:    Height as of this encounter: 175.3 cm (69.02\").    Weight as of this encounter: 119 kg (262 lb).                   Physical Exam  Constitutional:       Appearance: He is obese.   Cardiovascular:      Rate and Rhythm: Normal rate.   Pulmonary:      Effort: Pulmonary effort is normal.      Breath sounds: No wheezing.   Abdominal:      Palpations: Abdomen is soft.      Tenderness: There is no abdominal tenderness.   Musculoskeletal:         General: No swelling.      Cervical back: Neck supple. No tenderness.   Neurological:      Mental Status: He is alert. Mental status is at baseline.   Psychiatric:         Mood and Affect: Mood normal.        Result Review :  The following data was reviewed by: Mahrokh Nokhbehzaeim, MD on 10/30/2024:  CMP          3/22/2024    11:13 6/21/2024    08:12 10/23/2024    13:50   CMP   Glucose 92  67  419    BUN 16  30  19    Creatinine 1.05  1.22  1.18    EGFR 75.4   65.2    Sodium 137  140  133    Potassium 4.0  4.7  4.5    Chloride 102  101  96    Calcium 9.9  10.9  10.0    Total Protein  7.3     Total Protein 7.1   7.7    Albumin 4.0  4.4  4.1    Globulin  2.9     Globulin 3.1   3.6    Total Bilirubin 0.3  0.8  0.6    Alkaline Phosphatase 63  51  72    AST (SGOT) 19  17  20    ALT (SGPT) 24  24  31    Albumin/Globulin Ratio 1.3   1.1    BUN/Creatinine Ratio 15.2  24.6  16.1    Anion Gap 9.8   14.1      CMP          3/22/2024    11:13 6/21/2024    08:12 10/23/2024    13:50   CMP   Glucose 92  67  419    BUN 16  30  19    Creatinine 1.05  1.22  1.18    EGFR 75.4  " " 65.2    Sodium 137  140  133    Potassium 4.0  4.7  4.5    Chloride 102  101  96    Calcium 9.9  10.9  10.0    Total Protein  7.3     Total Protein 7.1   7.7    Albumin 4.0  4.4  4.1    Globulin  2.9     Globulin 3.1   3.6    Total Bilirubin 0.3  0.8  0.6    Alkaline Phosphatase 63  51  72    AST (SGOT) 19  17  20    ALT (SGPT) 24  24  31    Albumin/Globulin Ratio 1.3   1.1    BUN/Creatinine Ratio 15.2  24.6  16.1    Anion Gap 9.8   14.1       Lipid Panel          3/22/2024    11:13 6/21/2024    08:12 10/23/2024    13:50   Lipid Panel   Total Cholesterol 128   197    Total Cholesterol  197     Triglycerides 346  332  529    HDL Cholesterol 31  32  29    VLDL Cholesterol 52  57  85    LDL Cholesterol  45  108  83    LDL/HDL Ratio 0.90   2.14       Most Recent A1C          10/23/2024    13:50   HGBA1C Most Recent   Hemoglobin A1C 11.80          A1C Last 3 Results          3/22/2024    11:13 6/21/2024    08:12 10/23/2024    13:50   HGBA1C Last 3 Results   Hemoglobin A1C 11.50  11.30  11.80          No results found for: \"FREET4\"   No results found for: \"AAJK06TG\"   PTH, Intact   Date Value Ref Range Status   10/23/2024 22.8 15.0 - 65.0 pg/mL Final                   Assessment and Plan   Diagnoses and all orders for this visit:    1. Type 2 diabetes mellitus with hyperglycemia, with long-term current use of insulin (Primary)  Assessment & Plan:  Diabetes is worsening.   Medication changes per orders.  Recommended an ADA diet.  Discussed sick day management.  Discussed foot care.  Reminded to get yearly retinal exam.  Lantus 50 units twice daily  If pre-breakfast sugar is below 90 on 2 consecutive days, then decrease your long-acting insulin/ Lantus  by 2 units.  Humalog 20 units with meals  Continue Metformin and Actos  Stop taking Ozempic   Emphasized on the importance of medication compliance  Repeat labs before next visit  Diabetes will be reassessed in 3 months    Orders:  -     Hemoglobin A1c; Future  -     " Comprehensive Metabolic Panel; Future  -     Microalbumin / Creatinine Urine Ratio - Urine, Clean Catch; Future    2. Hypercalcemia  Assessment & Plan:  Used to take Tums  Repeat calcium within the normal range  PTH normal      3. Mixed diabetic hyperlipidemia associated with type 2 diabetes mellitus  Assessment & Plan:   Continue the current medication  Repeat labs before next visit    Orders:  -     Lipid Panel; Future      He is starting [place] [facility] 11/1.        Follow Up   Return in about 3 months (around 1/30/2025).  Patient was given instructions and counseling regarding his condition or for health maintenance advice. Please see specific information pulled into the AVS if appropriate.

## 2024-10-30 NOTE — PROGRESS NOTES
Specialty Pharmacy Refill Coordination Note     Brian is a 74 y.o. male contacted today regarding refills of  1 specialty medication(s).    Reviewed and verified with patient:       Specialty medication(s) and dose(s) confirmed: n/a        Delivery Questions      Flowsheet Row Most Recent Value   Delivery method UPS   Delivery address verified with patient/caregiver? Yes   Delivery address Home   Number of medications in delivery 1   Medication(s) being filled and delivered Continuous Glucose Sensor (FreeStyle Ada 14 Day Sensor)   Doses left of specialty medications n/a   Copay verified? Yes   Copay amount $0   Copay form of payment No copayment ($0)   Ship Date 10/30/24   Delivery Date 10/31/24   Signature Required No                   Follow-up: 21 day(s)     Mary Garzon, Pharmacy Technician  Specialty Pharmacy Technician

## 2024-11-07 ENCOUNTER — SPECIALTY PHARMACY (OUTPATIENT)
Dept: ENDOCRINOLOGY | Age: 74
End: 2024-11-07
Payer: MEDICARE

## 2024-11-07 NOTE — PROGRESS NOTES
" Specialty Pharmacy Patient Management Program  Program Disenrollment      Brian Christensen is a 74 y.o. male seen by an Endocrinology provider for Type 2 Diabetes. Patient was previously enrolled in the Endocrinology Patient Management program offered by [facility].      Disenrolling patient today as he now has all of his medications filled through the \"[facility]\" program per patient's daughter. She states that all of his care and prescriptions are completley covered through this program.    It has been a pleasure to provider care for this patient and we are happy to help in the future if our pharmacy services are needed.    Nelida Mojica, PharmD   11/7/2024  15:20 EST    "